# Patient Record
Sex: FEMALE | Race: OTHER | HISPANIC OR LATINO | Employment: FULL TIME | URBAN - METROPOLITAN AREA
[De-identification: names, ages, dates, MRNs, and addresses within clinical notes are randomized per-mention and may not be internally consistent; named-entity substitution may affect disease eponyms.]

---

## 2017-01-30 ENCOUNTER — APPOINTMENT (OUTPATIENT)
Dept: LAB | Facility: HOSPITAL | Age: 62
End: 2017-01-30
Attending: INTERNAL MEDICINE
Payer: COMMERCIAL

## 2017-01-30 DIAGNOSIS — E03.9 HYPOTHYROIDISM: ICD-10-CM

## 2017-01-30 DIAGNOSIS — F41.9 ANXIETY DISORDER: ICD-10-CM

## 2017-01-30 DIAGNOSIS — M54.50 LOW BACK PAIN: ICD-10-CM

## 2017-01-30 LAB
25(OH)D3 SERPL-MCNC: 32.6 NG/ML (ref 30–100)
ANION GAP SERPL CALCULATED.3IONS-SCNC: 5 MMOL/L (ref 4–13)
BASOPHILS # BLD AUTO: 0.02 THOUSANDS/ΜL (ref 0–0.1)
BASOPHILS NFR BLD AUTO: 0 % (ref 0–1)
BUN SERPL-MCNC: 19 MG/DL (ref 5–25)
CALCIUM SERPL-MCNC: 9 MG/DL (ref 8.3–10.1)
CHLORIDE SERPL-SCNC: 103 MMOL/L (ref 100–108)
CO2 SERPL-SCNC: 32 MMOL/L (ref 21–32)
CREAT SERPL-MCNC: 0.78 MG/DL (ref 0.6–1.3)
EOSINOPHIL # BLD AUTO: 0.13 THOUSAND/ΜL (ref 0–0.61)
EOSINOPHIL NFR BLD AUTO: 2 % (ref 0–6)
ERYTHROCYTE [DISTWIDTH] IN BLOOD BY AUTOMATED COUNT: 14.3 % (ref 11.6–15.1)
GFR SERPL CREATININE-BSD FRML MDRD: >60 ML/MIN/1.73SQ M
GLUCOSE SERPL-MCNC: 94 MG/DL (ref 65–140)
HCT VFR BLD AUTO: 37 % (ref 34.8–46.1)
HGB BLD-MCNC: 12.2 G/DL (ref 11.5–15.4)
LYMPHOCYTES # BLD AUTO: 2.61 THOUSANDS/ΜL (ref 0.6–4.47)
LYMPHOCYTES NFR BLD AUTO: 34 % (ref 14–44)
MAGNESIUM SERPL-MCNC: 2 MG/DL (ref 1.6–2.6)
MCH RBC QN AUTO: 27.4 PG (ref 26.8–34.3)
MCHC RBC AUTO-ENTMCNC: 33 G/DL (ref 31.4–37.4)
MCV RBC AUTO: 83 FL (ref 82–98)
MONOCYTES # BLD AUTO: 0.51 THOUSAND/ΜL (ref 0.17–1.22)
MONOCYTES NFR BLD AUTO: 7 % (ref 4–12)
NEUTROPHILS # BLD AUTO: 4.36 THOUSANDS/ΜL (ref 1.85–7.62)
NEUTS SEG NFR BLD AUTO: 57 % (ref 43–75)
NRBC BLD AUTO-RTO: 0 /100 WBCS
PLATELET # BLD AUTO: 290 THOUSANDS/UL (ref 149–390)
PMV BLD AUTO: 11.3 FL (ref 8.9–12.7)
POTASSIUM SERPL-SCNC: 4.1 MMOL/L (ref 3.5–5.3)
RBC # BLD AUTO: 4.45 MILLION/UL (ref 3.81–5.12)
SODIUM SERPL-SCNC: 140 MMOL/L (ref 136–145)
T4 FREE SERPL-MCNC: 1.21 NG/DL (ref 0.76–1.46)
TSH SERPL DL<=0.05 MIU/L-ACNC: 3.32 UIU/ML (ref 0.36–3.74)
WBC # BLD AUTO: 7.63 THOUSAND/UL (ref 4.31–10.16)

## 2017-01-30 PROCEDURE — 85025 COMPLETE CBC W/AUTO DIFF WBC: CPT

## 2017-01-30 PROCEDURE — 83735 ASSAY OF MAGNESIUM: CPT

## 2017-01-30 PROCEDURE — 80048 BASIC METABOLIC PNL TOTAL CA: CPT

## 2017-01-30 PROCEDURE — 82306 VITAMIN D 25 HYDROXY: CPT

## 2017-01-30 PROCEDURE — 84439 ASSAY OF FREE THYROXINE: CPT

## 2017-01-30 PROCEDURE — 84443 ASSAY THYROID STIM HORMONE: CPT

## 2017-01-30 PROCEDURE — 36415 COLL VENOUS BLD VENIPUNCTURE: CPT

## 2017-01-31 ENCOUNTER — ALLSCRIPTS OFFICE VISIT (OUTPATIENT)
Dept: OTHER | Facility: OTHER | Age: 62
End: 2017-01-31

## 2017-02-01 DIAGNOSIS — E03.9 HYPOTHYROIDISM: ICD-10-CM

## 2017-02-01 DIAGNOSIS — H61.22 IMPACTED CERUMEN OF LEFT EAR: ICD-10-CM

## 2017-02-01 DIAGNOSIS — Z12.11 ENCOUNTER FOR SCREENING FOR MALIGNANT NEOPLASM OF COLON: ICD-10-CM

## 2017-02-23 ENCOUNTER — APPOINTMENT (OUTPATIENT)
Dept: LAB | Facility: HOSPITAL | Age: 62
End: 2017-02-23
Attending: INTERNAL MEDICINE
Payer: COMMERCIAL

## 2017-02-23 DIAGNOSIS — Z12.11 ENCOUNTER FOR SCREENING FOR MALIGNANT NEOPLASM OF COLON: ICD-10-CM

## 2017-02-23 LAB — HEMOCCULT STL QL IA: NEGATIVE

## 2017-02-23 PROCEDURE — G0328 FECAL BLOOD SCRN IMMUNOASSAY: HCPCS

## 2017-02-24 ENCOUNTER — GENERIC CONVERSION - ENCOUNTER (OUTPATIENT)
Dept: OTHER | Facility: OTHER | Age: 62
End: 2017-02-24

## 2017-03-21 ENCOUNTER — GENERIC CONVERSION - ENCOUNTER (OUTPATIENT)
Dept: OTHER | Facility: OTHER | Age: 62
End: 2017-03-21

## 2017-03-21 ENCOUNTER — APPOINTMENT (EMERGENCY)
Dept: CT IMAGING | Facility: HOSPITAL | Age: 62
End: 2017-03-21
Payer: COMMERCIAL

## 2017-03-21 ENCOUNTER — APPOINTMENT (EMERGENCY)
Dept: RADIOLOGY | Facility: HOSPITAL | Age: 62
End: 2017-03-21
Payer: COMMERCIAL

## 2017-03-21 ENCOUNTER — HOSPITAL ENCOUNTER (OUTPATIENT)
Facility: HOSPITAL | Age: 62
Setting detail: OBSERVATION
Discharge: HOME | End: 2017-03-22
Attending: EMERGENCY MEDICINE | Admitting: INTERNAL MEDICINE
Payer: COMMERCIAL

## 2017-03-21 DIAGNOSIS — I44.7 LEFT BUNDLE BRANCH BLOCK (LBBB) ON ELECTROCARDIOGRAM: ICD-10-CM

## 2017-03-21 DIAGNOSIS — I63.9 STROKE (HCC): Primary | ICD-10-CM

## 2017-03-21 DIAGNOSIS — R29.90 MULTIPLE NEUROLOGICAL SYMPTOMS: ICD-10-CM

## 2017-03-21 PROBLEM — I45.4: Status: ACTIVE | Noted: 2017-03-21

## 2017-03-21 PROBLEM — I10 ACCELERATED HYPERTENSION: Status: ACTIVE | Noted: 2017-03-21

## 2017-03-21 PROBLEM — F41.8 DEPRESSION WITH ANXIETY: Status: ACTIVE | Noted: 2017-03-21

## 2017-03-21 PROBLEM — E03.4 HYPOTHYROIDISM DUE TO ACQUIRED ATROPHY OF THYROID: Status: ACTIVE | Noted: 2017-03-21

## 2017-03-21 LAB
ANION GAP SERPL CALCULATED.3IONS-SCNC: 5 MMOL/L (ref 4–13)
BASOPHILS # BLD AUTO: 0.01 THOUSANDS/ΜL (ref 0–0.1)
BASOPHILS NFR BLD AUTO: 0 % (ref 0–1)
BUN SERPL-MCNC: 17 MG/DL (ref 5–25)
CALCIUM SERPL-MCNC: 9 MG/DL (ref 8.3–10.1)
CHLORIDE SERPL-SCNC: 107 MMOL/L (ref 100–108)
CO2 SERPL-SCNC: 32 MMOL/L (ref 21–32)
CREAT SERPL-MCNC: 0.77 MG/DL (ref 0.6–1.3)
EOSINOPHIL # BLD AUTO: 0.1 THOUSAND/ΜL (ref 0–0.61)
EOSINOPHIL NFR BLD AUTO: 2 % (ref 0–6)
ERYTHROCYTE [DISTWIDTH] IN BLOOD BY AUTOMATED COUNT: 14.2 % (ref 11.6–15.1)
GFR SERPL CREATININE-BSD FRML MDRD: >60 ML/MIN/1.73SQ M
GLUCOSE SERPL-MCNC: 101 MG/DL (ref 65–140)
HCT VFR BLD AUTO: 37 % (ref 34.8–46.1)
HGB BLD-MCNC: 12 G/DL (ref 11.5–15.4)
LYMPHOCYTES # BLD AUTO: 2.16 THOUSANDS/ΜL (ref 0.6–4.47)
LYMPHOCYTES NFR BLD AUTO: 32 % (ref 14–44)
MCH RBC QN AUTO: 27 PG (ref 26.8–34.3)
MCHC RBC AUTO-ENTMCNC: 32.4 G/DL (ref 31.4–37.4)
MCV RBC AUTO: 83 FL (ref 82–98)
MONOCYTES # BLD AUTO: 0.53 THOUSAND/ΜL (ref 0.17–1.22)
MONOCYTES NFR BLD AUTO: 8 % (ref 4–12)
NEUTROPHILS # BLD AUTO: 3.89 THOUSANDS/ΜL (ref 1.85–7.62)
NEUTS SEG NFR BLD AUTO: 58 % (ref 43–75)
NRBC BLD AUTO-RTO: 0 /100 WBCS
PLATELET # BLD AUTO: 238 THOUSANDS/UL (ref 149–390)
PMV BLD AUTO: 11.7 FL (ref 8.9–12.7)
POTASSIUM SERPL-SCNC: 4 MMOL/L (ref 3.5–5.3)
RBC # BLD AUTO: 4.45 MILLION/UL (ref 3.81–5.12)
SODIUM SERPL-SCNC: 144 MMOL/L (ref 136–145)
TROPONIN I SERPL-MCNC: 0.02 NG/ML
WBC # BLD AUTO: 6.69 THOUSAND/UL (ref 4.31–10.16)

## 2017-03-21 PROCEDURE — 70496 CT ANGIOGRAPHY HEAD: CPT

## 2017-03-21 PROCEDURE — 80048 BASIC METABOLIC PNL TOTAL CA: CPT

## 2017-03-21 PROCEDURE — 36415 COLL VENOUS BLD VENIPUNCTURE: CPT

## 2017-03-21 PROCEDURE — 70498 CT ANGIOGRAPHY NECK: CPT

## 2017-03-21 PROCEDURE — 85025 COMPLETE CBC W/AUTO DIFF WBC: CPT | Performed by: EMERGENCY MEDICINE

## 2017-03-21 PROCEDURE — 71020 HB CHEST X-RAY 2VW FRONTAL&LATL: CPT

## 2017-03-21 PROCEDURE — 93005 ELECTROCARDIOGRAM TRACING: CPT

## 2017-03-21 PROCEDURE — 99285 EMERGENCY DEPT VISIT HI MDM: CPT

## 2017-03-21 PROCEDURE — 84484 ASSAY OF TROPONIN QUANT: CPT | Performed by: EMERGENCY MEDICINE

## 2017-03-21 RX ORDER — ASPIRIN 81 MG/1
324 TABLET, CHEWABLE ORAL ONCE
Status: COMPLETED | OUTPATIENT
Start: 2017-03-21 | End: 2017-03-21

## 2017-03-21 RX ORDER — LEVOTHYROXINE SODIUM 0.05 MG/1
50 TABLET ORAL
Status: DISCONTINUED | OUTPATIENT
Start: 2017-03-22 | End: 2017-03-22 | Stop reason: HOSPADM

## 2017-03-21 RX ORDER — ALPRAZOLAM 0.5 MG/1
TABLET ORAL
COMMUNITY
Start: 2013-12-25 | End: 2018-03-27 | Stop reason: SDUPTHER

## 2017-03-21 RX ORDER — ALPRAZOLAM 0.5 MG/1
0.5 TABLET ORAL 2 TIMES DAILY PRN
Status: DISCONTINUED | OUTPATIENT
Start: 2017-03-21 | End: 2017-03-22 | Stop reason: HOSPADM

## 2017-03-21 RX ORDER — LEVOTHYROXINE SODIUM 0.05 MG/1
50 TABLET ORAL DAILY
COMMUNITY
End: 2018-03-27 | Stop reason: SDUPTHER

## 2017-03-21 RX ORDER — ASPIRIN 325 MG
325 TABLET ORAL DAILY
Status: DISCONTINUED | OUTPATIENT
Start: 2017-03-22 | End: 2017-03-22

## 2017-03-21 RX ORDER — ATORVASTATIN CALCIUM 40 MG/1
40 TABLET, FILM COATED ORAL EVERY EVENING
Status: DISCONTINUED | OUTPATIENT
Start: 2017-03-21 | End: 2017-03-22

## 2017-03-21 RX ADMIN — ASPIRIN 324 MG: 81 TABLET, CHEWABLE ORAL at 18:44

## 2017-03-21 RX ADMIN — IOHEXOL 85 ML: 350 INJECTION, SOLUTION INTRAVENOUS at 17:42

## 2017-03-21 RX ADMIN — ATORVASTATIN CALCIUM 40 MG: 40 TABLET, FILM COATED ORAL at 21:30

## 2017-03-22 ENCOUNTER — GENERIC CONVERSION - ENCOUNTER (OUTPATIENT)
Dept: OTHER | Facility: OTHER | Age: 62
End: 2017-03-22

## 2017-03-22 ENCOUNTER — APPOINTMENT (INPATIENT)
Dept: NON INVASIVE DIAGNOSTICS | Facility: HOSPITAL | Age: 62
End: 2017-03-22
Payer: COMMERCIAL

## 2017-03-22 ENCOUNTER — APPOINTMENT (INPATIENT)
Dept: SPEECH THERAPY | Facility: HOSPITAL | Age: 62
End: 2017-03-22
Payer: COMMERCIAL

## 2017-03-22 ENCOUNTER — APPOINTMENT (INPATIENT)
Dept: MRI IMAGING | Facility: HOSPITAL | Age: 62
End: 2017-03-22
Payer: COMMERCIAL

## 2017-03-22 VITALS
HEART RATE: 80 BPM | OXYGEN SATURATION: 91 % | SYSTOLIC BLOOD PRESSURE: 114 MMHG | DIASTOLIC BLOOD PRESSURE: 61 MMHG | RESPIRATION RATE: 18 BRPM | TEMPERATURE: 96.7 F | WEIGHT: 122 LBS

## 2017-03-22 PROBLEM — I10 ACCELERATED HYPERTENSION: Status: RESOLVED | Noted: 2017-03-21 | Resolved: 2017-03-22

## 2017-03-22 PROBLEM — R55 NEAR SYNCOPE: Status: ACTIVE | Noted: 2017-03-21

## 2017-03-22 LAB
ATRIAL RATE: 69 BPM
CHOLEST SERPL-MCNC: 210 MG/DL (ref 50–200)
HCYS SERPL-SCNC: 7.6 UMOL/L (ref 3.7–11.2)
HDLC SERPL-MCNC: 89 MG/DL (ref 40–60)
LDLC SERPL CALC-MCNC: 116 MG/DL (ref 0–100)
P AXIS: 70 DEGREES
PR INTERVAL: 168 MS
QRS AXIS: 12 DEGREES
QRSD INTERVAL: 130 MS
QT INTERVAL: 438 MS
QTC INTERVAL: 469 MS
T WAVE AXIS: 68 DEGREES
TRIGL SERPL-MCNC: 27 MG/DL
VENTRICULAR RATE: 69 BPM

## 2017-03-22 PROCEDURE — 93306 TTE W/DOPPLER COMPLETE: CPT

## 2017-03-22 PROCEDURE — 93880 EXTRACRANIAL BILAT STUDY: CPT

## 2017-03-22 PROCEDURE — 83090 ASSAY OF HOMOCYSTEINE: CPT | Performed by: INTERNAL MEDICINE

## 2017-03-22 PROCEDURE — 70551 MRI BRAIN STEM W/O DYE: CPT

## 2017-03-22 PROCEDURE — 80061 LIPID PANEL: CPT | Performed by: INTERNAL MEDICINE

## 2017-03-22 RX ADMIN — ASPIRIN 325 MG: 325 TABLET ORAL at 08:31

## 2017-03-22 RX ADMIN — LEVOTHYROXINE SODIUM 50 MCG: 50 TABLET ORAL at 05:48

## 2017-03-22 RX ADMIN — ENOXAPARIN SODIUM 40 MG: 40 INJECTION SUBCUTANEOUS at 08:31

## 2017-03-24 ENCOUNTER — GENERIC CONVERSION - ENCOUNTER (OUTPATIENT)
Dept: OTHER | Facility: OTHER | Age: 62
End: 2017-03-24

## 2017-03-30 ENCOUNTER — ALLSCRIPTS OFFICE VISIT (OUTPATIENT)
Dept: OTHER | Facility: OTHER | Age: 62
End: 2017-03-30

## 2017-03-31 ENCOUNTER — GENERIC CONVERSION - ENCOUNTER (OUTPATIENT)
Dept: OTHER | Facility: OTHER | Age: 62
End: 2017-03-31

## 2017-09-18 ENCOUNTER — APPOINTMENT (OUTPATIENT)
Dept: LAB | Facility: HOSPITAL | Age: 62
End: 2017-09-18
Attending: INTERNAL MEDICINE
Payer: COMMERCIAL

## 2017-09-18 DIAGNOSIS — E03.9 HYPOTHYROIDISM: ICD-10-CM

## 2017-09-18 DIAGNOSIS — H61.22 IMPACTED CERUMEN OF LEFT EAR: ICD-10-CM

## 2017-09-18 LAB
25(OH)D3 SERPL-MCNC: 25.3 NG/ML (ref 30–100)
ALBUMIN SERPL BCP-MCNC: 3.6 G/DL (ref 3.5–5)
ALP SERPL-CCNC: 118 U/L (ref 46–116)
ALT SERPL W P-5'-P-CCNC: 36 U/L (ref 12–78)
ANION GAP SERPL CALCULATED.3IONS-SCNC: 5 MMOL/L (ref 4–13)
AST SERPL W P-5'-P-CCNC: 26 U/L (ref 5–45)
BACTERIA UR QL AUTO: ABNORMAL /HPF
BASOPHILS # BLD AUTO: 0.02 THOUSANDS/ΜL (ref 0–0.1)
BASOPHILS NFR BLD AUTO: 0 % (ref 0–1)
BILIRUB SERPL-MCNC: 0.4 MG/DL (ref 0.2–1)
BILIRUB UR QL STRIP: NEGATIVE
BUN SERPL-MCNC: 19 MG/DL (ref 5–25)
CALCIUM SERPL-MCNC: 9.2 MG/DL (ref 8.3–10.1)
CHLORIDE SERPL-SCNC: 107 MMOL/L (ref 100–108)
CLARITY UR: CLEAR
CO2 SERPL-SCNC: 32 MMOL/L (ref 21–32)
COLOR UR: YELLOW
CREAT SERPL-MCNC: 0.83 MG/DL (ref 0.6–1.3)
EOSINOPHIL # BLD AUTO: 0.08 THOUSAND/ΜL (ref 0–0.61)
EOSINOPHIL NFR BLD AUTO: 1 % (ref 0–6)
ERYTHROCYTE [DISTWIDTH] IN BLOOD BY AUTOMATED COUNT: 14.6 % (ref 11.6–15.1)
GFR SERPL CREATININE-BSD FRML MDRD: 76 ML/MIN/1.73SQ M
GLUCOSE SERPL-MCNC: 85 MG/DL (ref 65–140)
GLUCOSE UR STRIP-MCNC: NEGATIVE MG/DL
HCT VFR BLD AUTO: 35.5 % (ref 34.8–46.1)
HGB BLD-MCNC: 11.7 G/DL (ref 11.5–15.4)
HGB UR QL STRIP.AUTO: ABNORMAL
KETONES UR STRIP-MCNC: NEGATIVE MG/DL
LEUKOCYTE ESTERASE UR QL STRIP: NEGATIVE
LYMPHOCYTES # BLD AUTO: 2.21 THOUSANDS/ΜL (ref 0.6–4.47)
LYMPHOCYTES NFR BLD AUTO: 27 % (ref 14–44)
MCH RBC QN AUTO: 27.3 PG (ref 26.8–34.3)
MCHC RBC AUTO-ENTMCNC: 33 G/DL (ref 31.4–37.4)
MCV RBC AUTO: 83 FL (ref 82–98)
MONOCYTES # BLD AUTO: 0.61 THOUSAND/ΜL (ref 0.17–1.22)
MONOCYTES NFR BLD AUTO: 7 % (ref 4–12)
NEUTROPHILS # BLD AUTO: 5.41 THOUSANDS/ΜL (ref 1.85–7.62)
NEUTS SEG NFR BLD AUTO: 65 % (ref 43–75)
NITRITE UR QL STRIP: NEGATIVE
NON-SQ EPI CELLS URNS QL MICRO: ABNORMAL /HPF
NRBC BLD AUTO-RTO: 0 /100 WBCS
PH UR STRIP.AUTO: 6.5 [PH] (ref 4.5–8)
PLATELET # BLD AUTO: 230 THOUSANDS/UL (ref 149–390)
PMV BLD AUTO: 11.3 FL (ref 8.9–12.7)
POTASSIUM SERPL-SCNC: 4.3 MMOL/L (ref 3.5–5.3)
PROT SERPL-MCNC: 7.2 G/DL (ref 6.4–8.2)
PROT UR STRIP-MCNC: NEGATIVE MG/DL
RBC # BLD AUTO: 4.28 MILLION/UL (ref 3.81–5.12)
RBC #/AREA URNS AUTO: ABNORMAL /HPF
SODIUM SERPL-SCNC: 144 MMOL/L (ref 136–145)
SP GR UR STRIP.AUTO: 1.02 (ref 1–1.03)
T4 FREE SERPL-MCNC: 1.24 NG/DL (ref 0.76–1.46)
TSH SERPL DL<=0.05 MIU/L-ACNC: 2.6 UIU/ML (ref 0.36–3.74)
UROBILINOGEN UR QL STRIP.AUTO: 1 E.U./DL
WBC # BLD AUTO: 8.33 THOUSAND/UL (ref 4.31–10.16)
WBC #/AREA URNS AUTO: ABNORMAL /HPF

## 2017-09-18 PROCEDURE — 80053 COMPREHEN METABOLIC PANEL: CPT

## 2017-09-18 PROCEDURE — 84443 ASSAY THYROID STIM HORMONE: CPT

## 2017-09-18 PROCEDURE — 36415 COLL VENOUS BLD VENIPUNCTURE: CPT

## 2017-09-18 PROCEDURE — 82306 VITAMIN D 25 HYDROXY: CPT

## 2017-09-18 PROCEDURE — 85025 COMPLETE CBC W/AUTO DIFF WBC: CPT

## 2017-09-18 PROCEDURE — 84439 ASSAY OF FREE THYROXINE: CPT

## 2017-09-18 PROCEDURE — 81001 URINALYSIS AUTO W/SCOPE: CPT

## 2017-09-19 ENCOUNTER — ALLSCRIPTS OFFICE VISIT (OUTPATIENT)
Dept: OTHER | Facility: OTHER | Age: 62
End: 2017-09-19

## 2017-09-19 ENCOUNTER — TRANSCRIBE ORDERS (OUTPATIENT)
Dept: ADMINISTRATIVE | Facility: HOSPITAL | Age: 62
End: 2017-09-19

## 2017-09-19 DIAGNOSIS — Z12.31 VISIT FOR SCREENING MAMMOGRAM: Primary | ICD-10-CM

## 2017-10-02 ENCOUNTER — GENERIC CONVERSION - ENCOUNTER (OUTPATIENT)
Dept: OTHER | Facility: OTHER | Age: 62
End: 2017-10-02

## 2017-10-02 PROCEDURE — G0145 SCR C/V CYTO,THINLAYER,RESCR: HCPCS | Performed by: OBSTETRICS & GYNECOLOGY

## 2017-10-04 ENCOUNTER — LAB REQUISITION (OUTPATIENT)
Dept: LAB | Facility: HOSPITAL | Age: 62
End: 2017-10-04
Payer: COMMERCIAL

## 2017-10-04 DIAGNOSIS — Z12.4 ENCOUNTER FOR SCREENING FOR MALIGNANT NEOPLASM OF CERVIX: ICD-10-CM

## 2017-10-16 ENCOUNTER — HOSPITAL ENCOUNTER (OUTPATIENT)
Dept: MAMMOGRAPHY | Facility: HOSPITAL | Age: 62
Discharge: HOME | End: 2017-10-16
Attending: INTERNAL MEDICINE
Payer: COMMERCIAL

## 2017-10-16 DIAGNOSIS — Z12.31 ENCOUNTER FOR SCREENING MAMMOGRAM FOR MALIGNANT NEOPLASM OF BREAST: ICD-10-CM

## 2017-10-16 PROCEDURE — G0202 SCR MAMMO BI INCL CAD: HCPCS

## 2017-10-19 LAB
LAB AP GYN PRIMARY INTERPRETATION: NORMAL
Lab: NORMAL

## 2017-10-24 DIAGNOSIS — Z12.31 ENCOUNTER FOR SCREENING MAMMOGRAM FOR MALIGNANT NEOPLASM OF BREAST: ICD-10-CM

## 2017-10-26 NOTE — PROGRESS NOTES
Assessment  1  Hypothyroidism (244 9) (E03 9)   2  Sacroiliitis (720 2) (M46 1)   3  History of hysterectomy (V88 01) (Z90 710)   4  Vitamin D deficiency (268 9) (E55 9)    Plan  Allergic rhinitis    · Fluticasone Propionate 50 MCG/ACT Nasal Suspension (Flonase); USE TWO SPRAYS EACH  NOSTRIL ONCE DAILY  Anxiety    · From  ALPRAZolam 0 5 MG Oral Tablet TAKE 1 TABLET TWICE DAILY AS NEEDED To  ALPRAZolam 0 5 MG Oral Tablet TAKE 1/2  or  1   TABLET TWICE DAILY AS NEEDED  Anxiety, Chronic left-sided low back pain without sciatica, Hypothyroidism, Vitamin D deficiency    · Vitamin D3 62475 UNIT Oral Capsule; Take 1 tablet monthly  Complete tear of left rotator cuff    · Naproxen 500 MG Oral Tablet (Naprosyn); TAKE 1 TABLET EVERY 12 HOURS WITH FOOD  AS NEEDED  Health Maintenance    · Multi Vitamin/Minerals Oral Tablet; take 1 tablet by mouth every day   · * MAMMO SCREENING BILATERAL W CAD; Status:Hold For - Scheduling; Requested HWA:[id number];   Hypothyroidism    · *1 - [facility] (Obstetrics/ Gynecology) Co-Management  *  Status: Hold For - Scheduling   Requested for: [id number]  Care Summary provided  : Yes  Need for prophylactic vaccination and inoculation against influenza    · Fluzone Quadrivalent Intramuscular Suspension  Sacroiliitis    · (1) BASIC METABOLIC PROFILE; Status:Active; Requested for:19Mar2018;    · (1) CBC/PLT/DIFF; Status:Active; Requested for:19Mar2018;    · (1) T4, FREE; Status:Active; Requested for:19Mar2018;    · (1) TSH; Status:Active; Requested for:19Mar2018;    · (1) URINALYSIS (will reflex a microscopy if leukocytes, occult blood, protein or nitrites are not within  normal limits); Status:Active; Requested for:19Mar2018;    · (1) VITAMIN D 25-HYDROXY; Status:Active;  Requested for:19Mar2018;    · Follow-up visit in 6 months Evaluation and Treatment  Follow-up  Status: Hold For - Scheduling   Requested for: 51Wsg3079    Discussion/Summary  Discussion Summary:   Renew the Naprosyn will renew this Xanax she will continue level thyroxine  We will check a mammography will refer for her to gynecology because she had a partial hysterectomy she has had a gynecology exam in years is good for her to have another one to check her ovaries  She is totally asymptomatic  I reorder for an occasional anxiety episode on what she feels well she doesn't appear anxious or depressed  Counseling Documentation With Imm: The patient was counseled regarding diagnostic results,-- instructions for management,-- risk factor reductions,-- prognosis,-- patient and family education,-- impressions,-- risks and benefits of treatment options,-- importance of compliance with treatment  Medication SE Review and Pt Understands Tx: Possible side effects of new medications were reviewed with the patient/guardian today  The treatment plan was reviewed with the patient/guardian  The patient/guardian understands and agrees with the treatment plan      Chief Complaint  Chief Complaint Free Text Note Form: overdue 6 month follow up for Hypothyroidism  History of Present Illness  HPI: Problem #1 hypothyroidism is well-controlled low-level thyroxine TSH and T4 is normal  #2 anxiety she is doing well she is not a Lexapro anymore she takes NX only on a when necessary med basis which I order  D deficiency has been replace ibuprofen on the vitamin D protocol 50,000 units monthly  is always has some residual low back pain but she is living with the doing her activities of daily living I give her Naprosyn which helps 500 twice a day as needed  health maintenance she is stability due for her mammography October 24 so we give her mammography prescription today  reminded me she had a hysterectomy in the past       Review of Systems  Complete-Female:   Constitutional: No fever, no chills, feels well, no tiredness, no recent weight gain or weight loss     Eyes: No complaints of eye pain, no red eyes, no eyesight problems, no discharge, no dry eyes, no itching of eyes  ENT: no complaints of earache, no loss of hearing, no nose bleeds, no nasal discharge, no sore throat, no hoarseness  Cardiovascular: No complaints of slow heart rate, no fast heart rate, no chest pain, no palpitations, no leg claudication, no lower extremity edema  Respiratory: No complaints of shortness of breath, no wheezing, no cough, no SOB on exertion, no orthopnea, no PND  Gastrointestinal: No complaints of abdominal pain, no constipation, no nausea or vomiting, no diarrhea, no bloody stools  Genitourinary: No complaints of dysuria, no incontinence, no pelvic pain, no dysmenorrhea, no vaginal discharge or bleeding  Musculoskeletal: No complaints of arthralgias, no myalgias, no joint swelling or stiffness, no limb pain or swelling  Integumentary: No complaints of skin rash or lesions, no itching, no skin wounds, no breast pain or lump  Neurological: No complaints of headache, no confusion, no convulsions, no numbness, no dizziness or fainting, no tingling, no limb weakness, no difficulty walking  Psychiatric: Not suicidal, no sleep disturbance, no anxiety or depression, no change in personality, no emotional problems  Endocrine: No complaints of proptosis, no hot flashes, no muscle weakness, no deepening of the voice, no feelings of weakness  Hematologic/Lymphatic: No complaints of swollen glands, no swollen glands in the neck, does not bleed easily, does not bruise easily  Active Problems  1  Abdominal pain, LLQ (left lower quadrant) (789 04) (R10 32)   2  Acute back pain with sciatica, left (724 3) (M54 42)   3  Acute left-sided low back pain with left-sided sciatica (724 2,724 3) (M54 42)   4  Aftercare following surgery of the musculoskeletal system (V58 78) (Z47 89)   5  Allergic rhinitis (477 9) (J30 9)   6  Anemia (285 9) (D64 9)   7  Anxiety (300 00) (F41 9)   8  Cervical cancer screening (V76 2) (Z12 4)   9   Chronic left-sided low back pain without sciatica (724 2,338 29) (M54 5,G89 29)   10  Colon cancer screening (V76 51) (Z12 11)   11  Complete tear of left rotator cuff (727 61) (M75 122)   12  Depression with anxiety (300 4) (F41 8)   13  Dermatomycosis (111 9) (B36 9)   14  Eczema of external ear, left (380 22) (H60 542)   15  Encounter for screening mammogram for malignant neoplasm of breast (V76 12) (Z12 31)   16  Hyperlipidemia (272 4) (E78 5)   17  Hypothyroidism (244 9) (E03 9)   18  Impacted cerumen of left ear (380 4) (H61 22)   19  Left bundle branch block (426 3) (I44 7)   20  Left rotator cuff tear (840 4) (M75 102)   21  Microscopic hematuria (599 72) (R31 29)   22  Need for prophylactic vaccination and inoculation against influenza (V04 81) (Z23)   23  Otitis externa (380 10) (H60 90)   24  Pain in joint of left shoulder (719 41) (M25 512)   25  Rotator cuff tendinitis, unspecified laterality (726 10) (M75 80)   26  Sacroiliitis (720 2) (M46 1)   27  Screening for cholesterol level (V77 91) (Z13 220)   28  Shoulder pain, right (719 41) (M25 511)   29  Spider veins (448 1) (I78 1)   30  Spondylosis (721 90) (M47 9)   31  Surgical aftercare, injury or trauma (V58 43) (Z48 89)   32  Symptomatic reticular veins, bilateral   33  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, bilateral (454 8) (I83 813)   34  Varicose veins without complication (876 8) (E92 26)   35  Vesicles (709 8) (R23 8)   36  Vitamin D deficiency (268 9) (E55 9)    Past Medical History  1  Adhesive capsulitis of left shoulder (726 0) (M75 02)   2  History of allergic rhinitis (V12 69) (Z87 09)   3  History of No significant past medical history   4  History of 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 for tuberculosis (795 51) (R76 11)    Surgical History  1  History of Arthroscopy Shoulder   2  History of Breast Surgery Lumpectomy   3  History of Hysterectomy   4  History of Shoulder Arthroscopy With Rotator Cuff Repair    Family History  Mother    1  Family history of Breast Cancer (V16 3)   2  Family history of Depression  Daughter    3  Family history of Child 1  At Age 28   2  Family history of Thyroid Disorder (V18 19)  Son    5  Family history of Child 1  At Age ___   10  Family history of Kidney Cancer (V16 51)  Sibling    7  Family history of Cancer  Sister    8  Family history of Depression   9  Family history of Diabetes Mellitus (V18 0)   10  Family history of Thyroid Disorder (V18 19)  Brother    6  Family history of Cancer   12  Family history of Sister  At Age ___  Maternal Grandmother    15  Family history of heart attack (V17 3) (Z82 49)   14  Family history of liver cancer (V16 0) (Z80 0)  Maternal Grandfather    13  Family history of stroke (V17 1) (Z82 3)  Family History    12  Family history of Cancer   17  Family history of Essential Hypertension    Social History   ·    · Four children   · Functioning activity level   · High school or GED   · Living situation   · Never a smoker   · Never Drank Alcohol   · No drug use   · Not currently employed   · Occupation:   · Working Full Time  Social History Reviewed: The social history was reviewed and updated today  Current Meds   1  ALPRAZolam 0 5 MG Oral Tablet; TAKE 1 TABLET TWICE DAILY AS NEEDED; Therapy: 51SZU5564 to (Evaluate:16Bsa8912); Last Rx:2017 Ordered   2  Levothyroxine Sodium 50 MCG Oral Tablet; CARIDAD 1 TABLETA POR VIA ORAL DIARIAMENTE ONE   TABLET BY MOUTH DAILYONE TABLET BY MOUTH DAILY; Therapy: 84CCW4817 to (Evaluate:28Ypo5436)  Requested for: 40FOS2336; Last Rx:2017   Ordered  Medication List Reviewed: The medication list was reviewed and updated today  Allergies  1   No Known Drug Allergies    Vitals  Vital Signs    Recorded: 2017 03:00PM   Temperature 97 2 F   Heart Rate 92   Respiration 16   Systolic 454   Diastolic 67   Height 4 ft 11 5 in   Weight 126 lb 6 oz   BMI Calculated 25 1   BSA Calculated 1 53     Physical Exam    Constitutional   General appearance: No acute distress, well appearing and well nourished  Eyes   Conjunctiva and lids: No swelling, erythema or discharge  Pupils and irises: Equal, round and reactive to light  Ears, Nose, Mouth, and Throat   External inspection of ears and nose: Normal     Otoscopic examination: Tympanic membranes translucent with normal light reflex  Canals patent without erythema  Nasal mucosa, septum, and turbinates: Normal without edema or erythema  Oropharynx: Normal with no erythema, edema, exudate or lesions  Pulmonary   Respiratory effort: No increased work of breathing or signs of respiratory distress  Auscultation of lungs: Clear to auscultation  Cardiovascular   Palpation of heart: Normal PMI, no thrills  Auscultation of heart: Normal rate and rhythm, normal S1 and S2, without murmurs  Examination of extremities for edema and/or varicosities: Normal     Carotid pulses: Normal     Abdomen   Abdomen: Non-tender, no masses  Liver and spleen: No hepatomegaly or splenomegaly  Lymphatic   Palpation of lymph nodes in neck: No lymphadenopathy  Musculoskeletal   Gait and station: Normal     Digits and nails: Normal without clubbing or cyanosis  Inspection/palpation of joints, bones, and muscles: Normal     Skin   Skin and subcutaneous tissue: Normal without rashes or lesions  Psychiatric   Orientation to person, place, and time: Normal     Mood and affect: Abnormal   Mood and Affect: not anxious-- and-- not depressed          Signatures   Electronically signed by : JOO Carvajal ; Sep 19 2017  3:46PM EST                       (Author)

## 2017-12-20 ENCOUNTER — GENERIC CONVERSION - ENCOUNTER (OUTPATIENT)
Dept: OTHER | Facility: OTHER | Age: 62
End: 2017-12-20

## 2018-01-09 NOTE — MISCELLANEOUS
Chief Complaint  Chief Complaint Free Text Note Form: D/C [facility] 3 22 17 for near syncope  NO CHI WAS SCHEDULED  PATIENT DID NOT RETURN CALLS PLACED ON 3 23 17 @ 11:26 AND 3 24 17 @ 11:56  History of Present Illness  TCM Communication [facility]: The patient is being contacted for follow-up after hospitalization  She was hospitalized at [facility]  The date of admission: 3 21 17, date of discharge: 3 22 17  Diagnosis: near syncope  She was discharged to home  Medications were not reviewed today  She did not schedule a follow up appointment  Communication performed and completed by Constanza Dunn      Active Problems    1  Abdominal pain, LLQ (left lower quadrant) (789 04) (R10 32)   2  Acute back pain with sciatica, left (724 3) (M54 42)   3  Acute left-sided low back pain with left-sided sciatica (724 2,724 3) (M54 42)   4  Aftercare following surgery of the musculoskeletal system (V58 78) (Z47 89)   5  Allergic rhinitis (477 9) (J30 9)   6  Anemia (285 9) (D64 9)   7  Anxiety (300 00) (F41 9)   8  Cervical cancer screening (V76 2) (Z12 4)   9  Chronic left-sided low back pain without sciatica (724 2,338 29) (M54 5,G89 29)   10  Colon cancer screening (V76 51) (Z12 11)   11  Complete tear of left rotator cuff (727 61) (M75 122)   12  Depression with anxiety (300 4) (F41 8)   13  Dermatomycosis (111 9) (B36 9)   14  Eczema of external ear, left (380 22) (H60 542)   15  Encounter for screening mammogram for malignant neoplasm of breast (V76 12)    (Z12 31)   16  Hypothyroidism (244 9) (E03 9)   17  Impacted cerumen of left ear (380 4) (H61 22)   18  Left bundle branch block (426 3) (I44 7)   19  Left rotator cuff tear (840 4) (M75 102)   20  Microscopic hematuria (599 72) (R31 29)   21  Need for prophylactic vaccination and inoculation against influenza (V04 81) (Z23)   22  Otitis externa (380 10) (H60 90)   23  Pain in joint of left shoulder (719 41) (M25 512)   24   Rotator cuff tendinitis, unspecified laterality (726 10) (M75 80)   25  Sacroiliitis (720 2) (M46 1)   26  Screening for cholesterol level (V77 91) (Z13 220)   27  Shoulder pain, right (719 41) (M25 511)   28  Spider veins (448 1) (I78 1)   29  Spondylosis (721 90) (M47 9)   30  Surgical aftercare, injury or trauma (V58 43) (Z48 89)   31  Symptomatic reticular veins, bilateral (454 8) (I83 893)   32  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, bilateral (454 8) (I83 813)   33  Varicose veins without complication (162 9) (E90 63)   34  Vesicles (709 8) (R23 8)   35  Vitamin D deficiency (268 9) (E55 9)    Past Medical History    1  Adhesive capsulitis of left shoulder (726 0) (M75 02)   2  History of allergic rhinitis (V12 69) (Z87 09)   3  History of No significant past medical history   4  History of 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 for tuberculosis (795 51) (R76 11)    Surgical History    1  History of Arthroscopy Shoulder   2  History of Breast Surgery Lumpectomy   3  History of Hysterectomy   4  History of Shoulder Arthroscopy With Rotator Cuff Repair    Family History  Mother    1  Family history of Breast Cancer (V16 3)   2  Family history of Depression  Daughter    3  Family history of Child 1  At Age 28   2  Family history of Thyroid Disorder (V18 19)  Son    5  Family history of Child 1  At Age ___   10  Family history of Kidney Cancer (V16 51)  Sibling    7  Family history of Cancer  Sister    8  Family history of Depression   9  Family history of Diabetes Mellitus (V18 0)   10  Family history of Thyroid Disorder (V18 19)  Brother    6  Family history of Cancer   12  Family history of Sister  At Age ___  Maternal Grandmother    15  Family history of heart attack (V17 3) (Z82 49)   14  Family history of liver cancer (V16 0) (Z80 0)  Maternal Grandfather    13  Family history of stroke (V17 1) (Z82 3)  Family History    12  Family history of Cancer   17   Family history of Essential Hypertension    Social History    ·    · Four children   · Functioning activity level   · High school or GED   · Living situation   · Never a smoker   · Never Drank Alcohol   · No drug use   · Not currently employed   · Occupation:   · Working Full Time    Current Meds   1  ALPRAZolam 0 5 MG Oral Tablet; TAKE 1 TABLET TWICE DAILY AS NEEDED; Therapy: 25MCP2943 to (Evaluate:02Mar2017); Last Rx:31Jan2017 Ordered   2  Clotrimazole-Betamethasone 1-0 05 % External Cream; APPLY Y RUB IN A THIN FILM   HASTA AFFECTED AREAS TWICE A DAY(MORNING AND EVENING); Therapy: 84ELK7175 to (James Robb)  Requested for: 29Jhg7902; Last   Rx:06Aug2015 Ordered   3  Debrox 6 5 % Otic Solution; 5-10 drops in each ear twice a day; Therapy: 42CVA3239 to (Evaluate:10Feb2017)  Requested for: 63HWT8342; Last   Rx:31Jan2017 Ordered   4  Fluticasone Propionate 50 MCG/ACT Nasal Suspension; USE TWO SPRAYS EACH   NOSTRIL ONCE DAILY; Therapy: 13NAK4714 to (Last Rx:31Jan2017)  Requested for: 87HII2459 Ordered   5  Hydrocortisone 2 5 % External Ointment; APPLY SPARINGLY TO THE AFFECTED   AREA(S) TWICE DAILY; Therapy: 80EVX5910 to (Evaluate:10Dec2016)  Requested for: 25XKY2601; Last   Rx:11Oct2016 Ordered   6  Levothyroxine Sodium 50 MCG Oral Tablet; TOME ONE TABLET BY MOUTH DAILYTAKE   ONE TABLET BY MOUTH DAILY; Therapy: 53UBU6667 to (Evaluate:19Mar2017)  Requested for: 90RPB2249; Last   Rx:27Feb2017 Ordered   7  Naproxen 500 MG Oral Tablet; CARIDAD 1 TABLETA POR VIA ORAL DOS VECES AL NANETTE   ONE TABLET BY MOUTH TWICE DAILYTAKE ONE TABLET BY AYLIN; Therapy: 24QBE7407 to (Evaluate:04Jan2017)  Requested for: 81QAZ6202; Last   Rx:05Rov2547 Ordered   8  Neomycin-Polymyxin-HC 1 % Otic Solution; INSTILL 4 DROPS IN LEFT EAR 3-4 TIMES   DAILY; Therapy: 47PAI4305 to (Last Rx:17Aug2016)  Requested for: 17Aug2016; Status:   ACTIVE - Transmit to [facility] Verification Ordered   9  Vitamin D3 28447 UNIT Oral Capsule; Take 1 tablet monthly;    Therapy: 97LWX7628 to (Last Rx:11Oct2016) Requested for: 16CIJ7589 Ordered    Allergies    1  No Known Drug Allergies    Results/Data  (1) LIPID PANEL, FASTING 21BCT1649 12:00AM Leonel Cano     Test Name Result Flag Reference   CHOLESTEROL 210     HDL,DIRECT 89     LDL CHOLESTEROL CALCULATED 116     TRIGLYCERIDES 27         Future Appointments    Date/Time Provider Specialty Site   03/30/2017 07:45 AM JOO Coronado  Internal Medicine [facility]   07/25/2017 03:15 PM JOO Coronado   Internal Medicine [facility]     Signatures   Electronically signed by : Erich Reese, ; Mar 24 2017  3:09PM EST                       (Co-author)    Electronically signed by : Erich Reese, ; Mar 28 2017  4:25PM EST                       (Co-author)    Electronically signed by : JOO Wheeler ; Mar 29 2017  7:31AM EST                       (Author)

## 2018-01-09 NOTE — MISCELLANEOUS
Message  Return to work or school:   James Trujillo is under my professional care  She was seen in my office on Today       The patient is one year out from her revision rotator cuff repair of her left shoulder and while she is better than she was when I first initiated care for her, she clearly is limited in her ability to return to her preinjury level of work and function  I have declared her maximally medically improved as of today  In order to determine her permanent restrictions I recommend a functional capacity evaluation if necessary to help her understand what she can and cannot do with that shoulder going forward          Signatures   Electronically signed by : JOO Quezada ; Apr 4 2016  2:45PM EST                       (Author)

## 2018-01-10 NOTE — RESULT NOTES
Message   Recorded as Task   Date: 03/29/2016 06:35 AM, Created By: Al Fry   Task Name: Follow Up   Assigned To: Hugo Dubois   Regarding Patient: Nancy Baker, Status: In Progress   CommentAlphia Hurter - 29 Mar 2016 6:35 AM     TASK CREATED  The quantity Kelly [facility]ravo TB test is negative which is good news  Milderd Ek - 29 Mar 2016 10:12 AM     TASK EDITED  I left a message for Sebastián Credit to call back  Milderd Ek - 29 Mar 2016 10:12 AM     TASK IN PROGRESS   Milderd Ek - 29 Mar 2016 5:13 PM     TASK EDITED  Sebastiánaruna Naranjo was pleased to hear that the test was negative  I faxed her a copy  Verified Results  (1) QUANTIFERON - TB GOLD 23Mar2016 04:13PM Al Fry   TW Order Number: [id number]    Performed at:  [address] [place] [address]  [id number]  : Andrea Lynch MD, Phone:  [phone]     Test Name Result Flag Reference   QUANTIFERON TB GOLD      Specimen incubated at [place] [address] TB GOLD Negative  Negative   QUANTIFERON CRITERIA Comment     To be considered positive a specimen should have a TB Ag minus Nilvalue greater than or equal to 0 35 IU/mL and in addition the TB Agminus Nil value must be greater than or equal to 25% of the Nilvalue  There may be insufficient information in these values todifferentiate between some negative and some indeterminate testvalues  QUANTIFERON TB AG VALUE 0 29 IU/mL     QUANTIFERON NIL VALUE 0 07 IU/mL     QUANTIFERON MITOGEN VALUE >10 00 IU/mL     QFT TB AG MINUS NIL VALUE 0 22 IU/mL     QFT INTERPRETATION Comment     The QuantiFERON TB Gold (in Tube) assay is intended for use as an aidin the diagnosis of TB infection  Negative results suggest that thereis no TB infection  In patients with high suspicion of exposure, anegative test should be repeated  A positive test indicates infectionwith Mycobacterium tuberculosis   Among individuals withouttuberculosis infection, a positive test may be due to exposure Annette tristan, JOO adorno or JOO boyd  On the Internet, go tocdc gov/tb for further details

## 2018-01-12 VITALS
DIASTOLIC BLOOD PRESSURE: 76 MMHG | TEMPERATURE: 97.9 F | BODY MASS INDEX: 24.37 KG/M2 | WEIGHT: 124.13 LBS | RESPIRATION RATE: 14 BRPM | HEART RATE: 76 BPM | SYSTOLIC BLOOD PRESSURE: 118 MMHG | HEIGHT: 60 IN

## 2018-01-12 VITALS
BODY MASS INDEX: 24.81 KG/M2 | DIASTOLIC BLOOD PRESSURE: 67 MMHG | SYSTOLIC BLOOD PRESSURE: 115 MMHG | RESPIRATION RATE: 16 BRPM | HEART RATE: 92 BPM | HEIGHT: 60 IN | WEIGHT: 126.38 LBS | TEMPERATURE: 97.2 F

## 2018-01-12 NOTE — RESULT NOTES
Message   Recorded as Task   Date: 08/17/2016 12:09 PM, Created By: Krzysztof Merchant   Task Name: Follow Up   Assigned To: Alberto Reeves   Regarding Patient: Breana Joe, Status: In Progress   CommentPaolo Pappas - 17 Aug 2016 12:09 PM     TASK CREATED  MRI of the spine showed a small lateral disc protrusion at L5-S1 on the left side most likely the reason when she has the pain  The plan is to say and continue pain management    We will also send her to neurosurgery for second opinion if the pain does not improve   [facility] - 17 Aug 2016 2:03 PM     TASK EDITED  I reviewed the MRI report [facility]  She has an appt with Pain management on 9/22/16  She went to Physical Therapy for evaluation recently  She is also c/o her left ear feeling clogged  She was given Cortisporin Otic solution in the past for this, and wanted to know if we can send a refill  [facility] - 17 Aug 2016 2:03 PM     TASK IN PROGRESS   [facility] - 17 Aug 2016 6:19 PM     TASK EDITED  Dr Alejandro Forte agreed to send the Cortisporin Otic solution to the pharmacy  Verified Results  (1) CBC/PLT/DIFF 83Usy5820 12:23PM Lore Wong Order Number: [id number]     Test Name Result Flag Reference   WBC COUNT 6 43 Thousand/uL  4 31-10 16   RBC COUNT 4 49 Million/uL  3 81-5 12   HEMOGLOBIN 11 7 g/dL  11 5-15 4   HEMATOCRIT 36 9 %  34 8-46  1   MCV 82 fL  82-98   MCH 26 1 pg L 26 8-34 3   MCHC 31 7 g/dL  31 4-37 4   RDW 14 7 %  11 6-15 1   MPV 12 6 fL  8 9-12 7   PLATELET COUNT 865 Thousands/uL  149-390   NEUTROPHILS RELATIVE PERCENT 54 %  43-75   LYMPHOCYTES RELATIVE PERCENT 35 %  14-44   MONOCYTES RELATIVE PERCENT 9 %  4-12   EOSINOPHILS RELATIVE PERCENT 2 %  0-6   BASOPHILS RELATIVE PERCENT 0 %  0-1   NEUTROPHILS ABSOLUTE COUNT 3 44 Thousands/?L  1 85-7 62   LYMPHOCYTES ABSOLUTE COUNT 2 27 Thousands/?L  0 60-4 47   MONOCYTES ABSOLUTE COUNT 0 56 Thousand/?L  0 17-1 22   EOSINOPHILS ABSOLUTE COUNT 0 14 Thousand/?L  0 00-0 61   BASOPHILS ABSOLUTE COUNT 0 02 Thousands/?L  0 00-0 10   - Patient Instructions: This bloodwork is non-fasting  Please drink two glasses of water morning of bloodwork  - Patient Instructions: This bloodwork is non-fasting  Please drink two glasses of water morning of bloodwork  (1) COMPREHENSIVE METABOLIC PANEL 80UUV3895 60:81YQ Alan Monte Order Number: [id number]     Test Name Result Flag Reference   GLUCOSE,RANDM 74 mg/dL     If the patient is fasting, the ADA then defines impaired fasting glucose as > 100 mg/dL and diabetes as > or equal to 123 mg/dL  SODIUM 144 mmol/L  136-145   POTASSIUM 4 2 mmol/L  3 5-5 3   CHLORIDE 107 mmol/L  100-108   CARBON DIOXIDE 28 mmol/L  21-32   ANION GAP (CALC) 9 mmol/L  4-13   BLOOD UREA NITROGEN 16 mg/dL  5-25   CREATININE 0 64 mg/dL  0 60-1 30   Standardized to IDMS reference method   CALCIUM 9 0 mg/dL  8 3-10 1   BILI, TOTAL 0 43 mg/dL  0 20-1 00   ALK PHOSPHATAS 99 U/L     ALT (SGPT) 31 U/L  12-78   AST(SGOT) 21 U/L  5-45   ALBUMIN 3 8 g/dL  3 5-5 0   TOTAL PROTEIN 7 5 g/dL  6 4-8 2   eGFR Non-African American      >60 0 ml/min/1 73sq m   - Patient Instructions: This bloodwork is non-fasting  Please drink two glasses of water morning of bloodwork  National Kidney Disease Education Program recommendations are as follows:  GFR calculation is accurate only with a steady state creatinine  Chronic Kidney disease less than 60 ml/min/1 73 sq  meters  Kidney failure less than 15 ml/min/1 73 sq  meters       (1) GGT 02Mac3387 12:23PM Curtis Galo Order Number: [id number]     Test Name Result Flag Reference   GGT 22 U/L  5-85     (1) T4, FREE 59Ulc6155 12:23PM Alan Monte 57 Order Number: [id number]     Test Name Result Flag Reference   T4,FREE 1 24 ng/dL  0 76-1 46     (1) TSH 15Txz4635 12:23PM Alan Monte 57 Order Number: [id number]     Test Name Result Flag Reference   TSH 3 361 uIU/mL  0 358-3 740   - Patient Instructions: This bloodwork is non-fasting  Please drink two glasses of water morning of bloodwork  - Patient Instructions: This bloodwork is non-fasting  Please drink two glasses of water morning of bloodwork  Patients undergoing fluorescein dye angiography may retain small amounts of fluorescein in the body for 48-72 hours post procedure  Samples containing fluorescein can produce falsely depressed TSH values  If the patient had this procedure,a specimen should be resubmitted post fluorescein clearance            The recommended reference ranges for TSH during pregnancy are as follows:  First trimester 0 1 to 2 5 uIU/mL  Second trimester  0 2 to 3 0 uIU/mL  Third trimester 0 3 to 3 0 uIU/m     (1) URINALYSIS (will reflex a microscopy if leukocytes, occult blood, protein or nitrites are not within normal limits) 97Mzd0784 12:23PM Nissa Gutierrez    Order Number: [id number]     Test Name Result Flag Reference   COLOR Yellow     CLARITY Clear     SPECIFIC GRAVITY UA 1 010  1 003-1 030   PH UA 6 0  4 5-8 0   LEUKOCYTE ESTERASE UA Negative  Negative   NITRITE UA Negative  Negative   PROTEIN UA Negative mg/dl  Negative   GLUCOSE UA Negative mg/dl  Negative   KETONES UA Negative mg/dl  Negative   UROBILINOGEN UA 0 2 E U /dl  0 2, 1 0 E U /dl   BILIRUBIN UA Negative  Negative   BLOOD UA Small A Negative, Trace-Intact   BACTERIA None Seen /hpf  None Seen, Occasional   EPITHELIAL CELLS Occasional /hpf  None Seen, Occasional   RBC UA 0-1 /hpf A None Seen   WBC UA None Seen /hpf  None Seen     (1) VITAMIN D 25-HYDROXY 39Tfi5269 12:23PM Alan Cornelius 57 Order Number: [id number]     Test Name Result Flag Reference   VIT D 25-HYDROX 28 7 ng/mL L 30 0-100 0   This assay is a certified procedure of the CDC Vitamin D Standardization Certification Program (VDSCP)     Deficiency <20ng/ml   Insufficiency 20-30ng/ml   Sufficient  ng/ml     *Patients undergoing fluorescein dye angiography may retain small amounts of fluorescein in the body for 48-72 hours post procedure  Samples containing fluorescein can produce falsely elevated Vitamin D values  If the patient had this procedure, a specimen should be resubmitted post fluorescein clearance  (1) MAGNESIUM 09Aug2016 12:23PM Jamie Hao Order Number: [id number]     Test Name Result Flag Reference   MAGNESIUM 1 9 mg/dL  1 6-2 6   - Patient Instructions: This bloodwork is non-fasting  Please drink two glasses of water morning of bloodwork  (1) SED RATE 09Aug2016 12:23PM Alan Bernard 57 Order Number: [id number]     Test Name Result Flag Reference   SED RATE 9 mm/hour  0-20     (1) C-REACTIVE PROTEIN 09Aug2016 12:23PM Ofelia MATA Order Number: [id number]     Test Name Result Flag Reference   C-REACT PROTEIN <3 0 mg/L  <3 0   - Patient Instructions: This bloodwork is non-fasting  Please drink two glasses of water morning of bloodwork         Plan  Complete tear of left rotator cuff, Otitis externa    · Cortisporin-TC 3 3-3-10-0 5 MG/ML SUSP  Otitis externa    · Neomycin-Polymyxin-HC 1 % Otic Solution; INSTILL 4 DROPS IN LEFT EAR 3-4  TIMES DAILY

## 2018-01-13 NOTE — PROGRESS NOTES
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 8) (T61 919)    Plan  Acute left-sided low back pain with left-sided sciatica, Anxiety, Sacroiliitis    · Stop: Gralise Starter 300 & 600 MG Oral Miscellaneous   Rx By: Jad Gill; Dispense: 0 Days ; #:1 X 78 Miscellaneous Packet; Refill: 0; For: Acute left-sided low back pain with left-sided sciatica, Anxiety, Sacroiliitis; CARLOS = N; Dispense Sample; Last Updated By: Gil Tran; 11/30/2016 10:44:19 AM  Symptomatic reticular veins, bilateral    · Follow-up PRN Evaluation and Treatment  Follow-up  Status: Complete  Done:  71FYJ8895   Ordered; For: Symptomatic reticular veins, bilateral; Ordered By: Vince Mcadams Performed:  Due: 04SKZ8168    Discussion/Summary  Discussion Summary:   [address] [facility] to the office to discuss results of her venous reflux study  She has no evidence of deep or superficial venous incompetence  She has not worn her compression stockings  She continues complain of itching in the vicinity of reticular/spider veins  I have offered her sclerotherapy  I explained to her that this would be an out of pocket expense  I have provided her with both a price/information packet for sclerotherapy  She will think it over and call the office and notify us of her decision  Chief Complaint  Chief Complaint Free Text Note Form: "I am here to go over my test results "         History of Present Illness  HPI: Patient had a LEVDR on 11/9/2016  She complains of bulging veins painful in the in the back of her left leg  She states that the other veins itch  She does not wear compression stockings or elevate her legs  She denies any bleeding veins  Review of Systems  Complete Female - Vasc:   Constitutional: No fever or chills, feels well, no tiredness, no recent weight gain or weight loss     Eyes: no sudden vision loss, no blurred vision and no double vision, but no eye pain, no eyesight problems, no dryness of the eyes, eyes not red, no purulent discharge from the eyes, no itching of the eyes and wears glasses  ENT: no loss of hearing, no nosebleeds, no hoarseness  Cardiovascular: no bleeding veins, but regular heart rate, no chest pain, no intermittent leg claudication, painful veins, no palpitations and leg pain with walking  Respiratory: No sob, no wheezing, no cough, no sob with exertion, no orthopnea  Gastrointestinal: No nausea, No vomiting, no diarrhea, no blood in stool  Genitourinary: no dysuria, no Hematuria,no urinary incontinence  Musculoskeletal: no limb pain, no limb swelling  Integumentary: no rash, no lesions, no wounds, no ulcer  Neurological: no dementia, no headache, no numbness, no limb weakness, no dizziness, no difficulty walking  Psychiatric: no depression, no mood disorders, no anxiety  Hematologic/Lymphatic: no bleeding disorder, no easy bruising  ROS Reviewed:   ROS reviewed  Active Problems    1  Abdominal pain, LLQ (left lower quadrant) (789 04) (R10 32)   2  Acute back pain with sciatica, left (724 3) (M54 42)   3  Acute left-sided low back pain with left-sided sciatica (724 2,724 3) (M54 42)   4  Aftercare following surgery of the musculoskeletal system (V58 78) (Z47 89)   5  Allergic rhinitis (477 9) (J30 9)   6  Anemia (285 9) (D64 9)   7  Anxiety (300 00) (F41 9)   8  Cervical cancer screening (V76 2) (Z12 4)   9  Chronic left-sided low back pain without sciatica (724 2,338 29) (M54 5,G89 29)   10  Complete tear of left rotator cuff (727 61) (M75 122)   11  Depression with anxiety (300 4) (F41 8)   12  Dermatomycosis (111 9) (B36 9)   13  Eczema of external ear, left (380 22) (H60 542)   14  Encounter for screening mammogram for malignant neoplasm of breast (V76 12)    (Z12 31)   15  Hypothyroidism (244 9) (E03 9)   16  Impacted cerumen of left ear (380 4) (H61 22)   17  Left bundle branch block (426 3) (I44 7)   18   Left rotator cuff tear (840 4) (M75 102)   19  Microscopic hematuria (599 72) (R31 29)   20  Need for prophylactic vaccination and inoculation against influenza (V04 81) (Z23)   21  Otitis externa (380 10) (H60 90)   22  Pain in joint of left shoulder (719 41) (M25 512)   23  Rotator cuff tendinitis, unspecified laterality (726 10) (M75 80)   24  Sacroiliitis (720 2) (M46 1)   25  Shoulder pain, right (719 41) (M25 511)   26  Spondylosis (721 90) (M47 9)   27  Surgical aftercare, injury or trauma (V58 43) (Z48 89)   28  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, bilateral (454 8) (I83 813)   29  Varicose veins without complication (951 9) (S75 43)   30  Vesicles (709 8) (R23 8)   31  Vitamin D deficiency (268 9) (E55 9)    Past Medical History    1  Adhesive capsulitis of left shoulder (726 0) (M75 02)   2  History of allergic rhinitis (V12 69) (Z87 09)   3  History of No significant past medical history   4  History of 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 for tuberculosis (795 51) (R76 11)  Active Problems And Past Medical History Reviewed: The active problems and past medical history were reviewed and updated today  Surgical History    1  History of Arthroscopy Shoulder   2  History of Breast Surgery Lumpectomy   3  History of Hysterectomy   4  History of Shoulder Arthroscopy With Rotator Cuff Repair  Surgical History Reviewed: The surgical history was reviewed and updated today  Family History  Mother    1  Family history of Breast Cancer (V16 3)   2  Family history of Depression  Daughter    3  Family history of Child 1  At Age 28   2  Family history of Thyroid Disorder (V18 19)  Son    5  Family history of Child 1  At Age ___   10  Family history of Kidney Cancer (V16 51)  Sibling    7  Family history of Cancer  Sister    8  Family history of Depression   9  Family history of Diabetes Mellitus (V18 0)   10  Family history of Thyroid Disorder (V18 19)  Brother    6  Family history of Cancer   12   Family history of Sister  At Age ___  Maternal Grandmother    15  Family history of heart attack (V17 3) (Z82 49)   14  Family history of liver cancer (V16 0) (Z80 0)  Maternal Grandfather    13  Family history of stroke (V17 1) (Z82 3)  Family History    12  Family history of Cancer   17  Family history of Essential Hypertension    Social History    ·    · Four children   · Functioning activity level   · High school or GED   · Living situation   · Never a smoker   · Never Drank Alcohol   · No drug use   · Not currently employed   · Occupation:   · Working Full Time  Social History Reviewed: The social history was reviewed and updated today  Current Meds   1  ALPRAZolam 0 5 MG Oral Tablet; TAKE 1 TABLET TWICE DAILY AS NEEDED; Therapy: 10SYU5196 to (Evaluate:92Euh3135); Last Rx:2016 Ordered   2  Clotrimazole-Betamethasone 1-0 05 % External Cream; APPLY Y RUB IN A THIN FILM   HASTA AFFECTED AREAS TWICE A DAY(MORNING AND EVENING); Therapy: 35PYJ9206 to (Estrella Day)  Requested for: 08Rby7676; Last   Rx:92Kbk8036 Ordered   3  Diclofenac Sodium 50 MG Oral Tablet Delayed Release; TAKE 1 TABLET 3 TIMES DAILY   AS NEEDED; Therapy: 13JSR6392 to (Patricia Bridges)  Requested for: 65Uew4082; Last   Rx:73Sdt7291 Ordered   4  Fluticasone Propionate 50 MCG/ACT Nasal Suspension; USE TWO SPRAYS EACH   NOSTRIL ONCE DAILY; Therapy: 87RWP3135 to (Last Rx:2016)  Requested for: 2016 Ordered   5  Gralise Starter 300 & 600 MG Oral Miscellaneous; Take 1 tablet daily of 300 mg for 9 days   then take 600 mg tablets 1 tablet daily; Therapy: 79QTM1961 to (Last Rx:2016) Ordered   6  Hydrocortisone 2 5 % External Ointment; APPLY SPARINGLY TO THE AFFECTED   AREA(S) TWICE DAILY; Therapy: 18AZF1996 to (Evaluate:87Dfo9384)  Requested for: 41VHV3214; Last   Rx:2016 Ordered   7   Levothyroxine Sodium 50 MCG Oral Tablet; CARIDAD 1 TABLETA POR VIA ORAL   DIARIAMENTETAKE ONE TABLET BY MOUTH DAILY; Therapy: 81GIK2278 to (Evaluate:38Qei6474)  Requested for: 00XKS7296; Last   Rx:13Jun2016 Ordered   8  Methocarbamol 500 MG Oral Tablet; TAKE 1 TABLET TWICE DAILY AS DIRECTED; Therapy: 17ZFS5763 to (Luiz Corona)  Requested for: 98Hpv9516; Last   Rx:85Hzy7249 Ordered   9  Neomycin-Polymyxin-HC 1 % Otic Solution; INSTILL 4 DROPS IN LEFT EAR 3-4 TIMES   DAILY; Therapy: 81PDT3617 to (Last Rx:17Aug2016)  Requested for: 17Aug2016; Status:   ACTIVE - Transmit to [facility] Verification Ordered   10  Vitamin D3 96701 UNIT Oral Capsule; Take 1 tablet monthly; Therapy: 41VAS6706 to (Last Rx:11Oct2016)  Requested for: 18ENA3736 Ordered  Medication List Reviewed: The medication list was reviewed and updated today  Allergies    1  No Known Drug Allergies    Vitals  Vital Signs    Recorded: 62QNQ7783 10:40AM   Heart Rate 77, L Radial   Pulse Quality Normal, L Radial   Respiration Quality Normal   Respiration 14   Systolic 118, LUE, Sitting   Diastolic 72, LUE, Sitting   Height 4 ft 11 5 in   Weight 124 lb 2 oz   BMI Calculated 24 65   BSA Calculated 1 51     Physical Exam    Posterior tibialis: right 2+ and left 2+  Dorsalis pedis: right 2+ and left 2+  Distal Pulse Exam: Normal Capillary Refill  Extremities: No upper or lower extremity edema  LE Varicose Veins: no right leg truncal veins, no left leg truncal veins, right leg reticular veins, left leg reticular veins, right leg spider veins and left leg spider veins  The heart rate was normal  The rhythm was regular  Pulmonary   Respiratory effort: No increased work of breathing or signs of respiratory distress  Auscultation of lungs: Clear to auscultation  No wheezing, no rales, no rhonchi  Abdomen   Abdomen: Abdomen soft, non-tender, no masses, non distended, no rebound tenderness  Psychiatric   Orientation to person, place and time: Normal    Eyes   Conjunctiva and lids: No swelling, erythema, or discharge     Ears, Nose, Mouth, and Throat   Hearing: Normal    Neck   Neck: No jugular venous distention, normal ROM and extension  No cervical adenopathy, trachea midline, neck supple  Results/Data  Diagnostic Studies Reviewed Vasc: I personally reviewed the films/images/results in the office today  My interpretation follows  Vascular Study Review Venous reflux study: No evidence of superficial venous incompetence  No evidence of deep venous incompetence  Future Appointments    Date/Time Provider Specialty Site   12/13/2016 10:45 AM JOO Pulido   Internal Medicine [facility] [place]     Signatures   Electronically signed by : Hector Moore DO; Nov 30 2016 11:01AM EST                       (Author)

## 2018-01-13 NOTE — MISCELLANEOUS
Message   Recorded as Task   Date: 10/11/2016 03:24 PM, Created By: Vinita Gil   Task Name: Care Coordination   Assigned To: [facility] surgery sched,Team   Regarding Patient: Fabrizio Virgen, Status: Active   Comment:    Radha Sanchez - 11 Oct 2016 3:24 PM     TASK CREATED  Caller: Alexis Patel, Care Coordinator; Care Coordination; [phone]    Martinez Mccloud from Dr Jose Farmer office LM on [facility] triage line today at 1121,states pt had injection on 10/3/16 (left SIJ inj) and still with pain, asking if pt should make OV or schedule another injection  Requesting we call pt at [phone]    DR Man Padorn, please advise  ***********   Cassidy Elizondo - 11 Oct 2016 4:10 PM     TASK REPLIED TO: Previously Assigned To [facility] [place] [facility],Team                      We had discussed left L4,5 mbb as well  This can be scheduled as a diagnostic test   Vinita Gil - 11 Oct 2016 4:11 PM     TASK REASSIGNED: Previously Assigned To JENNY Jimenez Em - 10 Nov 0501 1:40 PM     TASK EDITED                 Spoke with patient offered procedure and she agreed  Explained to patient Dr Man Padron was no longer with the office and she would be seeing Dr Fonseca Boys  She was okay with that  All pre procedure instructions were reviewed with patient   NPO 1 hour prior   No antibx   Needs      Patient agreed and I confirmed date, time and location        Active Problems    1  Abdominal pain, LLQ (left lower quadrant) (789 04) (R10 32)   2  Acute back pain with sciatica, left (724 3) (M54 42)   3  Acute left-sided low back pain with left-sided sciatica (724 2,724 3) (M54 42)   4  Aftercare following surgery of the musculoskeletal system (V58 78) (Z47 89)   5  Allergic rhinitis (477 9) (J30 9)   6  Anemia (285 9) (D64 9)   7  Anxiety (300 00) (F41 9)   8  Cervical cancer screening (V76 2) (Z12 4)   9  Chronic left-sided low back pain without sciatica (724 2,338 29) (M54 5,G89 29)   10  Complete tear of left rotator cuff (727 61) (M75 122)   11  Depression with anxiety (300 4) (F41 8)   12  Dermatomycosis (111 9) (B36 9)   13  Eczema of external ear, left (380 22) (H60 542)   14  Encounter for screening mammogram for malignant neoplasm of breast (V76 12)    (Z12 31)   15  Hypothyroidism (244 9) (E03 9)   16  Impacted cerumen of left ear (380 4) (H61 22)   17  Left bundle branch block (426 3) (I44 7)   18  Left rotator cuff tear (840 4) (M75 102)   19  Microscopic hematuria (599 72) (R31 29)   20  Need for prophylactic vaccination and inoculation against influenza (V04 81) (Z23)   21  Otitis externa (380 10) (H60 90)   22  Pain in joint of left shoulder (719 41) (M25 512)   23  Rotator cuff tendinitis, unspecified laterality (726 10) (M75 80)   24  Sacroiliitis (720 2) (M46 1)   25  Shoulder pain, right (719 41) (M25 511)   26  Spondylosis (721 90) (M47 9)   27  Surgical aftercare, injury or trauma (V58 43) (Z48 89)   28  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, bilateral (454 8) (I83 813)   29  Varicose veins without complication (883 1) (E32 82)   30  Vesicles (709 8) (R23 8)   31  Vitamin D deficiency (268 9) (E55 9)    Current Meds   1  ALPRAZolam 0 5 MG Oral Tablet; TAKE 1 TABLET TWICE DAILY AS NEEDED; Therapy: 62RRR9808 to (Evaluate:03Afk2580); Last Rx:07Jun2016 Ordered   2  Clotrimazole-Betamethasone 1-0 05 % External Cream; APPLY Y RUB IN A THIN FILM   HASTA AFFECTED AREAS TWICE A DAY(MORNING AND EVENING); Therapy: 35OPP4950 to (Anne Mondragon)  Requested for: 10Fmb6560; Last   Rx:23Jlk9920 Ordered   3  Diclofenac Sodium 50 MG Oral Tablet Delayed Release; TAKE 1 TABLET 3 TIMES DAILY   AS NEEDED; Therapy: 34SCG5124 to ([phone])  Requested for: 00Zga9085; Last   Rx:80Bur5295 Ordered   4  Fluticasone Propionate 50 MCG/ACT Nasal Suspension (Flonase); USE TWO SPRAYS   EACH NOSTRIL ONCE DAILY;    Therapy: 09RZN6802 to (Last Rx:13Jun2016)  Requested for: 13Jun2016 Ordered 5  Gralise Starter 300 & 600 MG Oral Miscellaneous; Take 1 tablet daily of 300 mg for 9 days   then take 600 mg tablets 1 tablet daily; Therapy: 00ABM0999 to (Last Rx:11Oct2016) Ordered   6  Hydrocortisone 2 5 % External Ointment; APPLY SPARINGLY TO THE AFFECTED   AREA(S) TWICE DAILY; Therapy: 04VKQ0481 to (Evaluate:36Pwd2421)  Requested for: 44KZH9328; Last   Rx:11Oct2016 Ordered   7  Levothyroxine Sodium 50 MCG Oral Tablet; CARIDAD 1 TABLETA POR VIA ORAL   DIARIAMENTETAKE ONE TABLET BY MOUTH DAILY; Therapy: 89CAO4256 to (Evaluate:87Gaj0937)  Requested for: 32JTK5772; Last   Rx:13Jun2016 Ordered   8  Methocarbamol 500 MG Oral Tablet; TAKE 1 TABLET TWICE DAILY AS DIRECTED; Therapy: 20YBO5113 to (Joellenliliana Marin)  Requested for: 70Arq0150; Last   Rx:21Iwd7352 Ordered   9  Neomycin-Polymyxin-HC 1 % Otic Solution; INSTILL 4 DROPS IN LEFT EAR 3-4 TIMES   DAILY; Therapy: 93JXC6693 to (Last Rx:60Uso1719)  Requested for: 77Omo0237; Status:   ACTIVE - Transmit to [facility] Ordered   10  Vitamin D3 44422 UNIT Oral Capsule; Take 1 tablet monthly; Therapy: 54PXZ1041 to (Last Rx:11Oct2016)  Requested for: 11Oct2016 Ordered    Allergies    1   No Known Drug Allergies    Signatures   Electronically signed by : Abiodun Rivera, ; Nov 10 2016  2:07PM EST                       (Author)

## 2018-01-14 NOTE — PROGRESS NOTES
Assessment    1  Never a smoker   2  Acute back pain with sciatica, left (724 3) (M54 42)   3  Allergic rhinitis (477 9) (J30 9)   4  Anxiety (300 00) (F41 9)    Plan  Acute back pain with sciatica, left    · Diclofenac Sodium 50 MG Oral Tablet Delayed Release; TAKE 1 TABLET 3 TIMES DAILY AS  NEEDED   · Methocarbamol 500 MG Oral Tablet; TAKE 1 TABLET TWICE DAILY AS DIRECTED   · TraMADol HCl - 50 MG Oral Tablet; TAKE 1 TABLET TWICE DAILY AS NEEDED   · * MRI LUMBAR SPINE WO CONTRAST; Status:Need Information - Financial Authorization; Requested  for:09Aug2016;    · *1 -  Physical Therapy Physical Therapy  Consult  Status: Active  Requested for: 09Aug2016  Care Summary provided  : Yes   · 1 - Yrn Purdy (Anesthesia) Physician Referral  Consult  Status: Active  Requested for:  [id number]  Care Summary provided  : Yes  Acute back pain with sciatica, left, Allergic rhinitis, Anxiety    · (1) CBC/PLT/DIFF; Status:Active; Requested for:09Aug2016;    · (1) COMPREHENSIVE METABOLIC PANEL; Status:Active; Requested for:09Aug2016;    · (1) C-REACTIVE PROTEIN; Status:Active; Requested for:09Aug2016;    · (1) GGT; Status:Active; Requested for:09Aug2016;    · (1) MAGNESIUM; Status:Active; Requested for:09Aug2016;    · (1) SED RATE; Status:Active; Requested for:09Aug2016;    · (1) T4, FREE; Status:Active; Requested for:09Aug2016;    · (1) TSH; Status:Active; Requested for:09Aug2016;    · (1) URINALYSIS (will reflex a microscopy if leukocytes, occult blood, protein or nitrites are not within  normal limits); Status:Active; Requested for:09Aug2016;    · (1) VITAMIN D 25-HYDROXY; Status:Active;  Requested for:09Aug2016;    · Follow-up visit in 2 months Evaluation and Treatment  Follow-up  Status: Hold For - Scheduling   Requested for: 09Aug2016  Acute left-sided low back pain with left-sided sciatica    · Cyclobenzaprine HCl - 5 MG Oral Tablet  Acute left-sided low back pain with left-sided sciatica, Complete tear of left rotator cuff    · Naproxen 500 MG Oral Tablet    Discussion/Summary  Discussion Summary:   Change her Naprosyn to Voltaren  Had changed her Flexeril to Robaxin  I added tramadol to take during the day so she can sleep at night  If she has severe pain  I order an MRI of the lumbosacral spine  I start her physical therapy  Dr Grace Christian consultation  Chief Complaint  Chief Complaint Free Text Note Form: 2 month follow up for low back pain +  c/o she still has the lower back pain on the left side but now it's worse  She is still using Flexeril and Naprosyn for the pain  Kiley Valladares was never a smoker  NEEDS COLONOSCOPY  History of Present Illness  HPI: Problem #1 low back pain persist she is not taking Naprosyn and Flexeril now she ran out of the prescription but when she was taking it it wasn't working so I switch her to both parent and Robaxin and I gave her tramadol for increasing pain I refer her to Dr Grace Christian for pain management  To see if he can inject her back and I'll order an MRI of the spine and the symptoms of suggestive of radiculopathy of the L4-L5 disc on the left her neurological exam is unremarkable she has good reflexes and good sensation but she has cross sign on the straight leg test on the right leg left leg about 40 degrees  before she has pain  She has earache F find no abnormality with her ears her tympanic membranes clear she has no nasal congestion throat is clear      Review of Systems  Complete-Female:   Constitutional: No fever, no chills, feels well, no tiredness, no recent weight gain or weight loss  Eyes: No complaints of eye pain, no red eyes, no eyesight problems, no discharge, no dry eyes, no itching of eyes  ENT: earache, but no complaints of earache, no loss of hearing, no nose bleeds, no nasal discharge, no sore throat, no hoarseness and as noted in HPI     Cardiovascular: No complaints of slow heart rate, no fast heart rate, no chest pain, no palpitations, no leg claudication, no lower extremity edema  Respiratory: No complaints of shortness of breath, no wheezing, no cough, no SOB on exertion, no orthopnea, no PND  Gastrointestinal: No complaints of abdominal pain, no constipation, no nausea or vomiting, no diarrhea, no bloody stools  Genitourinary: No complaints of dysuria, no incontinence, no pelvic pain, no dysmenorrhea, no vaginal discharge or bleeding  Musculoskeletal: No complaints of arthralgias, no myalgias, no joint swelling or stiffness, no limb pain or swelling and as noted in HPI  Integumentary: No complaints of skin rash or lesions, no itching, no skin wounds, no breast pain or lump  Neurological: No complaints of headache, no confusion, no convulsions, no numbness, no dizziness or fainting, no tingling, no limb weakness, no difficulty walking  Psychiatric: Not suicidal, no sleep disturbance, no anxiety or depression, no change in personality, no emotional problems  Endocrine: No complaints of proptosis, no hot flashes, no muscle weakness, no deepening of the voice, no feelings of weakness  Hematologic/Lymphatic: No complaints of swollen glands, no swollen glands in the neck, does not bleed easily, does not bruise easily  Active Problems    1  Abdominal pain, LLQ (left lower quadrant) (789 04) (R10 32)   2  Acute back pain with sciatica, left (724 3) (M54 42)   3  Acute left-sided low back pain with left-sided sciatica (724 2,724 3) (M54 42)   4  Aftercare following surgery of the musculoskeletal system (V58 78) (Z47 89)   5  Allergic rhinitis (477 9) (J30 9)   6  Anemia (285 9) (D64 9)   7  Anxiety (300 00) (F41 9)   8  Cervical cancer screening (V76 2) (Z12 4)   9  Complete tear of left rotator cuff (727 61) (M75 122)   10  Depression with anxiety (300 4) (F41 8)   11  Dermatomycosis (111 9) (B36 9)   12  Encounter for screening mammogram for malignant neoplasm of breast (V76 12) (Z12 31)   13  Hypothyroidism (244 9) (E03 9)   14  Impacted cerumen of left ear (380 4) (H61 22)   15  Left bundle branch block (426 3) (I44 7)   16  Left rotator cuff tear (840 4) (M75 102)   17  Microscopic hematuria (599 72) (R31 2)   18  Need for prophylactic vaccination and inoculation against influenza (V04 81) (Z23)   19  Otitis externa (380 10) (H60 90)   20  Pain in joint of left shoulder (719 41) (M25 512)   21  Rotator cuff tendinitis, unspecified laterality (726 10) (M75 80)   22  Shoulder pain, right (719 41) (M25 511)   23  Surgical aftercare, injury or trauma (V58 43) (Z48 89)   24  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, bilateral (454 8) (I83 813)   25  Varicose veins without complication (642 6) (Q07 50)   26  Vesicles (709 8) (R23 8)   27  Vitamin D deficiency (268 9) (E55 9)    Past Medical History    1  Adhesive capsulitis of left shoulder (726 0) (M75 02)   2  History of allergic rhinitis (V12 69) (Z87 09)   3  History of No significant past medical history   4  History of 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 for tuberculosis (795 51) (R76 11)    Surgical History    1  History of Arthroscopy Shoulder   2  History of Breast Surgery Lumpectomy   3  History of Hysterectomy   4  History of Shoulder Arthroscopy With Rotator Cuff Repair    Family History  Mother    1  Family history of Breast Cancer (V16 3)   2  Family history of Depression  Daughter    3  Family history of Child 1  At Age 28   2  Family history of Thyroid Disorder (V18 19)  Son    5  Family history of Child 1  At Age ___   10  Family history of Kidney Cancer (V16 51)  Sister    7  Family history of Depression   8  Family history of Diabetes Mellitus (V18 0)   9  Family history of Thyroid Disorder (V18 19)  Brother    10  Family history of Cancer   11  Family history of Sister  At Age ___  Family History    15  Family history of Cancer   13   Family history of Essential Hypertension    Social History    · Never a smoker   · Never Drank Alcohol   · Occupation:   · Working Full Time    Current Meds   1  ALPRAZolam 0 5 MG Oral Tablet; TAKE 1 TABLET TWICE DAILY AS NEEDED; Therapy: 45PIX5747 to (Evaluate:10Dbt0973); Last Rx:07Jun2016 Ordered   2  Clotrimazole-Betamethasone 1-0 05 % External Cream; APPLY Y RUB IN A THIN FILM HASTA   AFFECTED AREAS TWICE A DAY(MORNING AND EVENING); Therapy: 33DTN6633 to (Ruby Claude)  Requested for: 66Tot4536; Last Rx:24Jnr8454   Ordered   3  Cortisporin-TC 3 3-3-10-0 5 MG/ML SUSP; INSTILL 4 DROPS IN LEFT EAR 3-4 TIMES DAILY; Therapy: 30GSR3949 to 04 00 14 32 96)  Requested for: 20FQG8820; Last Rx:78Jnr9746   Ordered   4  Cyclobenzaprine HCl - 5 MG Oral Tablet; TAKE 1 TABLET AT BEDTIME AS NEEDED; Therapy: 84WCJ6737 to (Evaluate:95Ooe4342)  Requested for: 36AWW6586; Last Rx:07Jun2016   Ordered   5  Debrox 6 5 % Otic Solution; 5-10 drops in each ear twice a day; Therapy: 07BCN2624 to (Evaluate:24Jun2016)  Requested for: 82YCG5849; Last Rx:14Jun2016   Ordered   6  Fluticasone Propionate 50 MCG/ACT Nasal Suspension; USE TWO SPRAYS EACH NOSTRIL ONCE   DAILY; Therapy: 07GGI8814 to (Last Rx:13Jun2016)  Requested for: 13Jun2016 Ordered   7  Levothyroxine Sodium 50 MCG Oral Tablet; CARIDAD 1 TABLETA POR VIA ORAL DIARIAMENTETAKE   ONE TABLET BY MOUTH DAILY; Therapy: 98AAA8493 to (Evaluate:35Tdz4292)  Requested for: 32NYA6979; Last Rx:13Jun2016   Ordered   8  Naproxen 500 MG Oral Tablet; CARIDAD 1 TABLETA POR VIA ORAL DOS VECES AL DIATAKE ONE   TABLET BY MOUTH TWICE DAILY; Therapy: 10BGB4926 to (Evaluate:86Ina0179)  Requested for: 61BLP9015; Last Rx:07Jun2016   Ordered  Medication List Reviewed: The medication list was reviewed and updated today  Allergies    1   No Known Drug Allergies    Vitals  Vital Signs    Recorded: 33WOF5227 05:27MT   Systolic 445   Diastolic 70   Heart Rate 82   Respiration 15   Temperature 97 8 F   Height 4 ft 11 5 in   Weight 122 lb 4 oz   BMI Calculated 24 28   BSA Calculated 1 51     Physical Exam    Constitutional   General appearance: No acute distress, well appearing and well nourished  Eyes   Conjunctiva and lids: No swelling, erythema or discharge  Pupils and irises: Equal, round and reactive to light  Ears, Nose, Mouth, and Throat   External inspection of ears and nose: Normal     Otoscopic examination: Tympanic membranes translucent with normal light reflex  Canals patent without erythema  Oropharynx: Normal with no erythema, edema, exudate or lesions  Pulmonary   Respiratory effort: No increased work of breathing or signs of respiratory distress  Auscultation of lungs: Clear to auscultation  Cardiovascular   Palpation of heart: Normal PMI, no thrills  Auscultation of heart: Normal rate and rhythm, normal S1 and S2, without murmurs  Examination of extremities for edema and/or varicosities: Normal     Carotid pulses: Normal     Abdomen   Abdomen: Non-tender, no masses  Liver and spleen: No hepatomegaly or splenomegaly  Lymphatic   Palpation of lymph nodes in neck: No lymphadenopathy  Musculoskeletal   Gait and station: Normal     Digits and nails: Normal without clubbing or cyanosis  Inspection/palpation of joints, bones, and muscles: Abnormal   Palpation - lower mid-lumbar tenderness  Neurologic   Cranial nerves: Cranial nerves 2-12 intact  Reflexes: 2+ and symmetric  Sensation: No sensory loss  Able to toe walk she has difficulty and ankle walk good reflexes she has cross leg signs and raising the right leg which is suspect more of a disc and sciatica  Results/Data  PHQ-2 Adult Depression Screening 45Ipd8545 10:34AM User, [facility]     Test Name Result Flag Reference   PHQ-2 Adult Depression Score 0     Over the last two weeks, how often have you been bothered by any of the following problems?   Little interest or pleasure in doing things: Not at all - 0  Feeling down, depressed, or hopeless: Not at all - 0   PHQ-2 Adult Depression Screening Negative         Future Appointments    Date/Time Provider Specialty Site   11/30/2016 10:45 AM Alistair Garcia, DO Vascular Surgery [facility] [address] [place] [address]     Signatures   Electronically signed by : JOO Fernandez ; Aug  9 2016 11:32AM EST                       (Author)

## 2018-01-15 VITALS
DIASTOLIC BLOOD PRESSURE: 60 MMHG | HEART RATE: 76 BPM | WEIGHT: 122.13 LBS | SYSTOLIC BLOOD PRESSURE: 110 MMHG | TEMPERATURE: 98.1 F | RESPIRATION RATE: 14 BRPM | BODY MASS INDEX: 23.98 KG/M2 | HEIGHT: 60 IN

## 2018-01-15 NOTE — PROGRESS NOTES
Assessment    1  History of Shoulder Arthroscopy With Rotator Cuff Repair   2  Pain in joint of left shoulder (559 41) (M25 512)    Plan  Pain in joint of left shoulder    · Follow-up PRN Evaluation and Treatment  Follow-up  Status: Complete  Done:  38KUL0442 02:16PM    Discussion/Summary    The patient demonstrates a much better examination pain wise and strength wise then when I first met her before her revision rotator cuff repair  She has progressed nicely and does have a postoperative MRI documenting a healed rotator cuff repair, this is consistent with her examination  Unfortunately she still has pain and limitations which are precluding her from returning to her preinjury level of function  Since she is a year out from her revision surgery she is maximally medically improved in my opinion and if required a functional capacity evaluation could be obtained to define her permanent limitations  She feels she may have already had this study but is unclear and she is going to check with her  whether she requires another study  I am encourage that she has sought a new profession as clearly her limitations will prevent her from continuing her job as a  and instead I would focus on less manual labor type activities  Even though she has healed her rotator cuff repair she clearly has residual from her injury as demonstrated by her examination and pain with resisted external rotation and abducted testing  If she is to obtain a functional capacity evaluation I would be happy to see her afterwards to review those results, otherwise she will follow-up on an as-needed basis  The treatment plan was reviewed with the patient/guardian  The patient/guardian understands and agrees with the treatment plan      Chief Complaint    1  Shoulder Pain    History of Present Illness  Patient returns for follow-up of her revision rotator cuff repair of her left shoulder   She is now approximately one year out from my revision surgery, she did have a few surgeries before I became involved in her care  She is significantly better than she was a for eye intervene surgically but she still is quite limited  She has been unable to return to her preinjury level of function as a  due to the pain weakness and pain in her left shoulder  She again states it is painful with overhead activity and she cannot lift anything more than a few pounds without causing pain in the left shoulder  The pain she has is sharp is localized to the left proximal humerus, worse activity relieved by rest, sharp in intensity and intermittent in timing  No numbness or tingling or fevers and chills associated with this  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  Review of Systems    Constitutional: No fever, no chills, feels well, no tiredness, no recent weight gain or loss  Eyes: No complaints of eyesight problems, no red eyes  ENT: no loss of hearing, no nosebleeds, no sore throat  Cardiovascular: No complaints of chest pain, no palpitations, no leg claudication or lower extremity edema  Respiratory: no compliants of shortness of breath, no wheezing, no cough  Gastrointestinal: no complaints of abdominal pain, no constipation, no nausea or diarrhea, no vomiting, no bloody stools  Genitourinary: no complaints of dysuria, no incontinence  Musculoskeletal: as noted in HPI  Integumentary: no complaints of skin rash or lesion, no itching or dry skin, no skin wounds  Neurological: no complaints of headache, no confusion, no numbness or tingling, no dizziness  Endocrine: No complaints of muscle weakness, no feelings of weakness, no frequent urination, no excessive thirst    Psychiatric: No suicidal thoughts, no anxiety, no feelings of depression  ROS reviewed  Active Problems    1  Abdominal pain, LLQ (left lower quadrant) (138 18) (R10 32)   2   Aftercare following surgery of the musculoskeletal system (V58 78) (Z47 89)   3  Allergic rhinitis (477 9) (J30 9)   4  Anemia (285 9) (D64 9)   5  Anxiety (300 00) (F41 9)   6  Cervical cancer screening (V76 2) (Z12 4)   7  Complete tear of left rotator cuff (727 61) (M75 122)   8  Depression with anxiety (300 4) (F41 8)   9  Dermatomycosis (111 9) (B36 9)   10  Encounter for screening mammogram for malignant neoplasm of breast (V76 12)    (Z12 31)   11  Hypothyroidism (244 9) (E03 9)   12  Impacted cerumen of left ear (380 4) (H61 22)   13  Left bundle branch block (426 3) (I44 7)   14  Left rotator cuff tear (840 4) (M75 102)   15  Lumbago With Sciatica (724 3)   16  Microscopic hematuria (599 72) (R31 2)   17  Need for prophylactic vaccination and inoculation against influenza (V04 81) (Z23)   18  Otitis externa (380 10) (H60 90)   19  Pain in joint of left shoulder (719 41) (M25 512)   20  Rotator cuff tendinitis, unspecified laterality (726 10) (M75 80)   21  Shoulder pain, right (719 41) (M25 511)   22  Surgical aftercare, injury or trauma (V58 43) (Z48 89)   23  Varicose Veins Of Lower Extremities (454 9)   24  Vitamin D deficiency (268 9) (E55 9)    Past Medical History    · Adhesive capsulitis of left shoulder (726 0) (M75 02)   · History of allergic rhinitis (V12 69) (Z87 09)   · History of No significant past medical history   · History of 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 for tuberculosis (795 51) (R76 11)    The active problems and past medical history were reviewed and updated today  Surgical History    · History of Arthroscopy Shoulder   · History of Breast Surgery Lumpectomy   · History of Hysterectomy   · History of Shoulder Arthroscopy With Rotator Cuff Repair    The surgical history was reviewed and updated today         Family History    · Family history of Breast Cancer (V16 3)   · Family history of Depression    · Family history of Child 1  At Age 28   · Family history of Thyroid Disorder (V18 19)    · Family history of Child 1  At Age ___   · Family history of Kidney Cancer (V16 51)    · Family history of Depression   · Family history of Diabetes Mellitus (V18 0)   · Family history of Thyroid Disorder (V18 19)    · Family history of Cancer   · Family history of Sister  At Age ___    · Family history of Cancer   · Family history of Essential Hypertension    The family history was reviewed and updated today  Social History    · Never A Smoker   · Never Drank Alcohol   · Occupation:   · Working Full Time  The social history was reviewed and updated today  The social history was reviewed and is unchanged  Current Meds   1  ALPRAZolam 0 5 MG Oral Tablet; TAKE 1 TABLET TWICE DAILY AS NEEDED; Therapy: 18RAJ0012 to (Evaluate:90Qvk7285); Last Rx:56Foj1423 Ordered   2  Clotrimazole-Betamethasone 1-0 05 % External Cream; APPLY Y RUB IN A THIN FILM   HASTA AFFECTED AREAS TWICE A DAY(MORNING AND EVENING); Therapy: 53UWH9602 to (Jose R Schaefer)  Requested for: 63Gju4012; Last   Rx:76Rxi6502 Ordered   3  Cortisporin-TC 3 3-3-10-0 5 MG/ML Otic Suspension; INSTILL 4 DROPS IN LEFT EAR   3-4 TIMES DAILY; Therapy: 48JWB2805 to 60-77-74-40)  Requested for: 17BKM9932; Last   Rx:2014 Ordered   4  Fluticasone Propionate 50 MCG/ACT Nasal Suspension (Flonase); USE TWO SPRAYS   EACH NOSTRIL ONCE DAILY; Therapy: 76MRK2281 to (Evaluate:2016)  Requested for: 87Igy6087; Last   Rx:93Icc2525 Ordered   5  Levothyroxine Sodium 50 MCG Oral Tablet; CARIDAD 1 TABLETA POR VIA ORAL   DIARIAMENTETAKE ONE TABLET BY MOUTH DAILY; Therapy: 83JCL8805 to (Evaluate:2016)  Requested for: 03Wpb1376; Last   Rx:00Phx4376 Ordered   6  Naproxen 500 MG Oral Tablet; CARIDAD 1 TABLETA POR VIA ORAL DOS VECES AL   DIATAKE ONE TABLET BY MOUTH TWICE DAILY; Therapy: 10QYJ9426 to (Evaluate:2016)  Requested for: 85Dsl4459; Last   Rx:46Rdh0253 Ordered   7   TraMADol HCl - 50 MG Oral Tablet; TAKE 1 TO 2 TABLETS EVERY 6 HOURS AS NEEDED FOR PAIN;   Therapy: 77WEJ0972 to (Evaluate:05Jun2015); Last Rx:71Tiy0517 Ordered   8  Vitamin D3 2000 UNIT Oral Capsule; take 1 capsule daily; Therapy: 06BSA6183 to (Last Rx:13Jan2014) Ordered    The medication list was reviewed and updated today  Allergies    1  No Known Drug Allergies    Vitals   Recorded: 04Apr2016 01:44PM   Heart Rate 98   Systolic 121, Sitting   Diastolic 72, Sitting   Weight 119 lb 4 oz   BMI Calculated 23 68   BSA Calculated 1 49     Physical Exam    Tender about the proximal humerus, improved from her previous visits  ROM: equivalent both sides  Motor: Normal except as noted: Full strength but pain with resisted, 5/5 abduction and 5/5 external rotation  Special Tests: Negative except equivocal Painful Arc, equivocal Arenas test and equivocal Neer test, but negative Drop Arm test, negative Empty Can test, negative Lift Off test, negative Yergason's test, negative Speed's test, negative Belly Press test, negative Bear Hug test and negative Cross Body Adduction test  Left Shoulder Appearance[de-identified] Normal except Well healed incisions  Tenderness: None except the    Constitutional - General appearance: Normal    Musculoskeletal - Gait and station: Normal    Cardiovascular - Pulses: Normal    Skin - Skin and subcutaneous tissue: Normal    Psychiatric - Orientation to person, place, and time: Normal  Mood and affect: Normal    Eyes   Conjunctiva and lids: Normal        Future Appointments    Date/Time Provider Specialty Site   06/21/2016 10:15 AM JOO Hogan   [facility]     Signatures   Electronically signed by : Harlene Kanner, M D ; Apr 4 2016  2:19PM EST                       (Author)

## 2018-01-15 NOTE — MISCELLANEOUS
Message   Date: 31 Mar 2017 3:21 PM EST, Recorded By: Susie Nageotte For: Bekah Alessia   Caller: Odalis Caal, Self   Phone: [phone] (Home), [phone] (Work)   PC from PT re: her Cholesterol Level  She thought it was high & should be on medication  I spoke to DR Guillermo Newberry who had us run the Cholesterol Formula  Her result came back & her #'s are very good no Medication needed at present  PT notified & understood  Active Problems    1  Abdominal pain, LLQ (left lower quadrant) (789 04) (R10 32)   2  Acute back pain with sciatica, left (724 3) (M54 42)   3  Acute left-sided low back pain with left-sided sciatica (724 2,724 3) (M54 42)   4  Aftercare following surgery of the musculoskeletal system (V58 78) (Z47 89)   5  Allergic rhinitis (477 9) (J30 9)   6  Anemia (285 9) (D64 9)   7  Anxiety (300 00) (F41 9)   8  Cervical cancer screening (V76 2) (Z12 4)   9  Chronic left-sided low back pain without sciatica (724 2,338 29) (M54 5,G89 29)   10  Colon cancer screening (V76 51) (Z12 11)   11  Complete tear of left rotator cuff (727 61) (M75 122)   12  Depression with anxiety (300 4) (F41 8)   13  Dermatomycosis (111 9) (B36 9)   14  Eczema of external ear, left (380 22) (H60 542)   15  Encounter for screening mammogram for malignant neoplasm of breast (V76 12)    (Z12 31)   16  Hyperlipidemia (272 4) (E78 5)   17  Hypothyroidism (244 9) (E03 9)   18  Impacted cerumen of left ear (380 4) (H61 22)   19  Left bundle branch block (426 3) (I44 7)   20  Left rotator cuff tear (840 4) (M75 102)   21  Microscopic hematuria (599 72) (R31 29)   22  Need for prophylactic vaccination and inoculation against influenza (V04 81) (Z23)   23  Otitis externa (380 10) (H60 90)   24  Pain in joint of left shoulder (719 41) (M25 512)   25  Rotator cuff tendinitis, unspecified laterality (726 10) (M75 80)   26  Sacroiliitis (720 2) (M46 1)   27  Screening for cholesterol level (V77 91) (Z13 220)   28   Shoulder pain, right (719 41) (M25 511)   29  Spider veins (448 1) (I78 1)   30  Spondylosis (721 90) (M47 9)   31  Surgical aftercare, injury or trauma (V58 43) (Z48 89)   32  Symptomatic reticular veins, bilateral (454 8) (I83 893)   33  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, bilateral (454 8) (I83 813)   34  Varicose veins without complication (697 9) (L37 05)   35  Vesicles (709 8) (R23 8)   36  Vitamin D deficiency (268 9) (E55 9)    Current Meds   1  ALPRAZolam 0 5 MG Oral Tablet; TAKE 1 TABLET TWICE DAILY AS NEEDED; Therapy: 67TLM6608 to (Evaluate:02Mar2017); Last Rx:31Jan2017 Ordered   2  Diclofenac Sodium 50 MG Oral Tablet Delayed Release; TAKE 1 TABLET 3 TIMES DAILY   AS NEEDED; Therapy: 76Exm9128 to (Mitch Mart)  Requested for: 79NNG7552; Last   Rx:30Mar2017 Ordered   3  Levothyroxine Sodium 50 MCG Oral Tablet; TOME ONE TABLET BY MOUTH   DAILYTAKE ONE TABLET BY MOUTH DAILY; Therapy: 64RGA9742 to (Evaluate:19Mar2017)  Requested for: 96Fmj4437; Last   Rx:92Dvj6455 Ordered    Allergies    1   No Known Drug Allergies    Signatures   Electronically signed by : JOO Reyna ; Apr  3 2017  6:58AM EST                       (Author)

## 2018-01-15 NOTE — RESULT NOTES
Message   Recorded as Task   Date: 02/24/2017 03:22 PM, Created By: Vandana James   Task Name: Follow Up   Assigned To: Cherry Armenta   Regarding Patient: Rosalba Berger, Status: In Progress   Claudia Swenson - 24 Feb 2017 3:22 PM     TASK CREATED  The stools were Hemoccult-negative good news   [facility] - 02 Mar 2017 10:46 AM     TASK EDITED  I left a message for [employer] to call back  [facility] - 17 Mar 2017 10:46 AM     TASK IN PROGRESS   [facility] - 02 Mar 2017 5:11 PM     TASK EDITED  I reviewed the result with [employer]          Verified Results  (1) OCCULT BLOOD, FECAL IMMUNOCHEMICAL TEST 36Qsz3517 02:53PM Vandana James   TW Order Number: [id number]     Test Name Result Flag Reference   OCCULT BLD, FECAL IMMUNOLOGICAL Negative  Negative   Performed by Fecal Immunochemical Test

## 2018-01-16 NOTE — CONSULTS
Assessment    1  Chronic left-sided low back pain without sciatica (724 2,338 29) (M54 5,G89 29)   2  Sacroiliitis (720 2) (M46 1)    Plan  Chronic left-sided low back pain without sciatica, Sacroiliitis    · Follow-up PRN Evaluation and Treatment  Follow-up  Status: Complete  Done:  17YVL7932 02:09PM   Ordered; For: Chronic left-sided low back pain without sciatica, Sacroiliitis; Ordered By: Rohini Jean-Baptiste Performed:  Due: 47UKL0918    Discussion/Summary    29-year-old woman with chronic lower back pain and intermittent left leg pain  I reviewed her history, physical examination and imaging in detail with her today  A total of 30 minutes is spent with the patient which more and 50% was spent in direct counseling coordination of care  Her back pain seems to be most consistent with facetogenic pain and possibly sacral ileitis  Her leg symptoms are not typical for sciatica insofar they do not fit a particular radicular distribution  There is no obvious compression on neural structures on MRI of the lumbar spine  I explained that surgery for back pain is generally less rewarding  I would not recommend any surgical intervention  She is scheduled to see a pain specialist later this month  Left SI joint injection and possibly facet injections may be helpful  However, a course of membranes stabilizing agents or short course of oral strides if not already attempted, may be reasonable  She'll discuss this further with her PCP  All her questions were answered to her satisfaction  At this point in time, no plans for further follow-up through this office  She is in agreement with this course of action  Chief Complaint  Left-sided lower back pain  History of Present Illness  Pleasant 29-year-old woman who first developed left-sided lower back pain in March 2016 without inciting event  This is become progressively worse   Her graft she currently describes Aching sensation in the left lower back which can radiate into the buttock and hip  She rates it as 3 out of 10 when sitting but becomes acutely worse when going from sitting to standing and with walking  At the pain becomes more severe should begin stenosis diffuse pain in the left leg along with weakness and numbness  She denies any pain, weakness or numbness in her left leg when sitting or lying down  She denies any pain, weakness or numbness in her right leg  She denies any difficulties with bowel and bladder function or change in perineal sensation  Her current pain medications are listed below  She is unsure if she has tried membrane stabilizing agents before  She is scheduled to see a pain specialist later this month discuss ongoing intervention  Physical therapy has been helpful as well  She presents today with recent MRI of the lumbar spine and to discuss her surgical options  Review of Systems    Constitutional: feeling tired, but no fever and no chills  Eyes: no eyesight problems  ENT: earache, but no sore throat, no hearing loss and no hoarseness  Cardiovascular: no chest pain  Respiratory: no shortness of breath  Gastrointestinal: no abdominal pain, no nausea and no vomiting  Genitourinary: no incontinence  Musculoskeletal: limb pain    The patient presents with complaints of lower back arthralgias, radiating to the left upper leg and left lower leg Risk Factors: no lumbar disc surgery and no cervical disc surgery (Left-sided LBP  Scheduled to see Dr Jeremy Lagos on 9/22/16 for initial consultation  PT has been helping a little bit; 2x/week  Is able to sit a little longer than previous  Unable to walk >100 yards  Pain is worst with walking  Walking she scales her pain 8/10  Bending or reaching she scales her pain 10/10  )  Integumentary: no rashes and no itching     Neurological: tingling, limb weakness, difficulty walking and memory loss, but no numbness and no confusion    The patient presents with complaints of right > left no headache (HA at night, will wake her from sleep)  Psychiatric: sleep disturbances, depression and imbalance, but no anxiety  Endocrine: night sweats, but no hot flashes  Hematologic/Lymphatic: no current aspirin or fish oil, but no swollen glands, no tendency for easy bleeding, no tendency for easy bruising and no swollen glands in the neck  ROS reviewed  Active Problems    1  Abdominal pain, LLQ (left lower quadrant) (789 04) (R10 32)   2  Acute back pain with sciatica, left (724 3) (M54 42)   3  Acute left-sided low back pain with left-sided sciatica (724 2,724 3) (M54 42)   4  Aftercare following surgery of the musculoskeletal system (V58 78) (Z47 89)   5  Allergic rhinitis (477 9) (J30 9)   6  Anemia (285 9) (D64 9)   7  Anxiety (300 00) (F41 9)   8  Cervical cancer screening (V76 2) (Z12 4)   9  Complete tear of left rotator cuff (727 61) (M75 122)   10  Depression with anxiety (300 4) (F41 8)   11  Dermatomycosis (111 9) (B36 9)   12  Encounter for screening mammogram for malignant neoplasm of breast (V76 12)    (Z12 31)   13  Hypothyroidism (244 9) (E03 9)   14  Impacted cerumen of left ear (380 4) (H61 22)   15  Left bundle branch block (426 3) (I44 7)   16  Left rotator cuff tear (840 4) (M75 102)   17  Microscopic hematuria (599 72) (R31 2)   18  Need for prophylactic vaccination and inoculation against influenza (V04 81) (Z23)   19  Otitis externa (380 10) (H60 90)   20  Pain in joint of left shoulder (719 41) (M25 512)   21  Rotator cuff tendinitis, unspecified laterality (726 10) (M75 80)   22  Shoulder pain, right (719 41) (M25 511)   23  Surgical aftercare, injury or trauma (V58 43) (Z48 89)   24  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, bilateral (454 8) (I83 813)   25  Varicose veins without complication (319 3) (I68 53)   26  Vesicles (709 8) (R23 8)   27  Vitamin D deficiency (268 9) (E55 9)    Past Medical History    1  Adhesive capsulitis of left shoulder (726 0) (M85 02)   2   History of allergic rhinitis (V12 69) (Z87 09)   3  History of No significant past medical history   4  History of 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 for tuberculosis (795 51) (R76 11)    The active problems and past medical history were reviewed and updated today  Surgical History    1  History of Arthroscopy Shoulder   2  History of Breast Surgery Lumpectomy   3  History of Hysterectomy   4  History of Shoulder Arthroscopy With Rotator Cuff Repair    The surgical history was reviewed and updated today  Family History  Mother    1  Family history of Breast Cancer (V16 3)   2  Family history of Depression  Daughter    3  Family history of Child 1  At Age 28   2  Family history of Thyroid Disorder (V18 19)  Son    5  Family history of Child 1  At Age ___   10  Family history of Kidney Cancer (V16 51)  Sibling    7  Family history of Cancer  Sister    8  Family history of Depression   9  Family history of Diabetes Mellitus (V18 0)   10  Family history of Thyroid Disorder (V18 19)  Brother    6  Family history of Cancer   12  Family history of Sister  At Age ___  Maternal Grandmother    15  Family history of heart attack (V17 3) (Z82 49)   14  Family history of liver cancer (V16 0) (Z80 0)  Maternal Grandfather    13  Family history of stroke (V17 1) (Z82 3)  Family History    12  Family history of Cancer   17  Family history of Essential Hypertension    The family history was reviewed and updated today  Social History    ·    · Four children   · Functioning activity level   · High school or GED   · Living situation   · Never a smoker   · Never Drank Alcohol   · No drug use   · Not currently employed   · Occupation:   · Working Full Time  The social history was reviewed and updated today  Current Meds   1  ALPRAZolam 0 5 MG Oral Tablet; TAKE 1 TABLET TWICE DAILY AS NEEDED; Therapy: 63XZF8420 to (Evaluate:15Tor3810); Last Rx:2016 Ordered   2   Clotrimazole-Betamethasone 1-0 05 % External Cream; APPLY Y RUB IN A THIN FILM   HASTA AFFECTED AREAS TWICE A DAY(MORNING AND EVENING); Therapy: 93BED9047 to (Fátima Rounds)  Requested for: 06Aug2015; Last   Rx:06Aug2015 Ordered   3  Diclofenac Sodium 50 MG Oral Tablet Delayed Release; TAKE 1 TABLET 3 TIMES DAILY   AS NEEDED; Therapy: 94QWR2460 to (Bradley Kokhanok)  Requested for: 09Aug2016; Last   Rx:09Aug2016 Ordered   4  Fluticasone Propionate 50 MCG/ACT Nasal Suspension; USE TWO SPRAYS EACH   NOSTRIL ONCE DAILY; Therapy: 62XSD6966 to (Last Rx:13Jun2016)  Requested for: 13Jun2016 Ordered   5  Levothyroxine Sodium 50 MCG Oral Tablet; CARIDAD 1 TABLETA POR VIA ORAL   DIARIAMENTETAKE ONE TABLET BY MOUTH DAILY; Therapy: 84PPV8209 to (Evaluate:71Nny8371)  Requested for: 68XFU6923; Last   Rx:13Jun2016 Ordered   6  Methocarbamol 500 MG Oral Tablet; TAKE 1 TABLET TWICE DAILY AS DIRECTED; Therapy: 83EMH7394 to (Bradley Kokhanok)  Requested for: 09Aug2016; Last   Rx:09Aug2016 Ordered   7  Neomycin-Polymyxin-HC 1 % Otic Solution; INSTILL 4 DROPS IN LEFT EAR 3-4 TIMES   DAILY; Therapy: 59UGL3949 to (Last Rx:17Aug2016)  Requested for: 17Aug2016; Status:   ACTIVE - Transmit to Pharmacy - Awaiting Verification Ordered   8  TraMADol HCl - 50 MG Oral Tablet; TAKE 1 TABLET TWICE DAILY AS NEEDED; Therapy: 09Aug2016 to (Evaluate:03Omd0542); Last Rx:09Aug2016 Ordered    The medication list was reviewed and updated today  Allergies    1  No Known Drug Allergies    Vitals  Vital Signs    Recorded: 96KIL8461 87:43UJ   Systolic 801, LUE, Sitting   Diastolic 86, LUE, Sitting   Heart Rate 80   Respiration 16   Temperature 98 2 F, Tympanic   Height 5 ft    Weight 112 lb    BMI Calculated 21 87   BSA Calculated 1 46     Physical Exam     Constitutional Patient appears the stated age  No signs of acute distress present  No involuntary movement  Patient is cooperative     Musculo: Spine Lumbar/Sacral Spine: Normal   Randolph palpation over left SI joint  Randi's test positive on the left  Tenderness to palpation over left greater trochanter  Skin warm and dry  No rashes, lesions or ecchymosis  Neurologic - Mental Status: Alert and Oriented x3  Mood and affect: Affect is normal   Memory is intact  Motor System - Lower Extremities: Heel walk and toe walk intact and with out signs of paresis  5/5 power in lower extremities  Muscle tone: Normal bilaterally  Muscle Bulk: Normal bilaterally  Reflexes: Biceps reflexes are intact bilaterally  Brachioradialis reflexes are intact bilaterally  Achilles reflexes are 2+ bilaterally  Babinski's reflex is down going bilaterally  Garvey's sign is absent bilaterally  Deep tendon reflexes: 1+ right patella, 1+ left patella, 1+ right ankle jerk and 1+ left ankle jerkno ankle clonus on the right and no ankle clonus on the left  Superficial/Primitive Reflexes: Babinski reflex absent on the right and Babinski reflex absent on the left  Coordination: Finger to nose was normal   Coordination: normal balance, negative Romberg's sign and no dysdiadochokinesia  Sensory: Sensation grossly intact to light touch  Gait and Station: Able to heal toe gait and Romberg negative  Results/Data    I personally reviewed the mri in detail with the patient  MRI Review MRI of the lumbar spine without contrast dated August 16, 2016  Lumbar lordosis  Mild degenerative disc disease at L5-S1  Facet arthropathy more prominently on the right than the left at L4-5 and L5-S1  No significant compression or neural structures  Intrathecal contents unremarkable  Future Appointments    Date/Time Provider Specialty Site   09/22/2016 02:45 PM Sagar Conley MD Pain Management [facility]   11/30/2016 10:45 AM Randy Garcia DO Vascular [facility]  [place]   10/11/2016 10:30 AM JOO Fay   Internal Medicine [facility]     Signatures   Electronically signed by : JOO Polanco ; Sep  9 2016 2:11PM EST                       (Author)

## 2018-01-16 NOTE — PROGRESS NOTES
Assessment    1  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, bilateral (454 8) (Q32 [id number])    Plan    1  Apply moisturizing cream or lotion several times a day ; Status:Complete;   Done:   [id number]   2  Begin or continue regular aerobic exercise  Gradually work up to at least count1   sessions of dur1 of exercise a week ; Status:Complete;   Done: 23DML7930   3  Keep your leg elevated whenever you can to decrease swelling and increase circulation ;   Status:Complete;   Done: 02NZK4760   4  Restrict the salt in your diet by avoiding highly salted foods ; Status:Complete;   Done:   10GYQ3787   5  Support stockings can help keep the blood from pooling in the small veins in your feet   and legs ; Status:Complete;   Done: 75NAP7060   6  Gradient compression stocking, below knee, 20-30mm Hg, each; Status:Complete;     Done: 71JBY9523   7  VAS LOWER LIMB VENOUS DUPLEX STUDY, WITH REFLUX ASSESSMENT, COMPLETE   BILATERAL; Status:Active; Requested for:08Nov2016;    8  Follow-up visit in 3 months Evaluation and Treatment  Follow-up  Status: Complete    Done: 03ZNE9875    Discussion/Summary  Discussion Summary: This patient has bilateral lower extremity reticular varicosities which cause significant itching, as well as aching, tired, heavy pain and intermittent edema  We discussed the pathophysiology of venous disease  She will begin a course of conservative management to include the daily use of 20-30 mmHg gradient compression stockings, elevation, regular physical activity, and maintenance of a healthy weight  She will have a venous study to assess for reflux and return to the office with a physician for review  Chief Complaint  Chief Complaint Free Text Note Form: "I am here to get my legs checked "         History of Present Illness  Varicose Veins [facility]: The patient is being seen for a consultation regarding varicose veins     Symptoms:  bilateral dilated veins, bilateral discolored veins, bilateral leg swelling, bilateral spider veins, itching bilaterally and bilateral leg pain, but no bulging veins, no muscle cramps, no stasis dermatitis, no cellulitis, no hyperpigmentation, no venous ulcers, no dry skin and no bleeding  The patient describes the pain as aching, itching, burning and heavy  This patient has no history of DVT, pulmonary embolism, superficial venous thrombosis, or a hypercoagulable state  Evaluation and Treatment History: This patient has had no prior surgical treatment of the venous system  This patient is using periodic leg elevation   Efficacy of conservative treatment: The conservative measures have helped maintain this patient's symptoms but have not controlled them completely  Free Text HPI: Ms Olena Ortiz is new to our practice  She is here for evaluation of bilateral lower extremity varicose veins  She says the veins began in her 45s, but lately they have been causing her significant itching pain  The itching pain is worse to the left leg and localized over a cluster of reticular varicosities  She complains that her legs feel heavy, tired and achy on a daily basis  She reports intermittent ankle edema  She tried to wear compression stockings in the remote past, but stopped because she didn't like the way they felt on her skin  She elevates periodically, which she says helps her symptoms  Review of Systems  Complete Female - Vasc:   Constitutional: No fever or chills, feels well, no tiredness, no recent weight gain or weight loss  Eyes: dryness of the eyes, no sudden vision loss and no double vision, but no eye pain, no eyesight problems, eyes not red, no purulent discharge from the eyes, no itching of the eyes, wears glasses and blurred vision  ENT: no loss of hearing, no nosebleeds, no hoarseness  Cardiovascular: no bleeding veins, but regular heart rate, no chest pain, no intermittent leg claudication, painful veins, no palpitations and leg pain with walking  Respiratory: No sob, no wheezing, no cough, no sob with exertion, no orthopnea  Gastrointestinal: No nausea, No vomiting, no diarrhea, no blood in stool  Genitourinary: no dysuria, no Hematuria,no urinary incontinence  Musculoskeletal: no lumbar pain, limb pain, joint stiffness and limb swelling, but no joint swelling and no myalgias  Integumentary: no rash, no lesions, no wounds, no ulcer  Neurological: no dementia, no headache, no numbness, no limb weakness, no dizziness, no difficulty walking  Psychiatric: no depression, no mood disorders, no anxiety  Hematologic/Lymphatic: no bleeding disorder, no easy bruising  ROS Reviewed:   ROS reviewed  Active Problems    1  Abdominal pain, LLQ (left lower quadrant) (789 04) (R10 32)   2  Acute left-sided low back pain with left-sided sciatica (724 2,724 3) (M54 42)   3  Aftercare following surgery of the musculoskeletal system (V58 78) (Z47 89)   4  Allergic rhinitis (477 9) (J30 9)   5  Anemia (285 9) (D64 9)   6  Anxiety (300 00) (F41 9)   7  Cervical cancer screening (V76 2) (Z12 4)   8  Complete tear of left rotator cuff (727 61) (M75 122)   9  Depression with anxiety (300 4) (F41 8)   10  Dermatomycosis (111 9) (B36 9)   11  Encounter for screening mammogram for malignant neoplasm of breast (V76 12)    (Z12 31)   12  Hypothyroidism (244 9) (E03 9)   13  Impacted cerumen of left ear (380 4) (H61 22)   14  Left bundle branch block (426 3) (I44 7)   15  Left rotator cuff tear (840 4) (M75 102)   16  Lumbago With Sciatica (724 3)   17  Microscopic hematuria (599 72) (R31 2)   18  Need for prophylactic vaccination and inoculation against influenza (V04 81) (Z23)   19  Otitis externa (380 10) (H60 90)   20  Pain in joint of left shoulder (719 41) (M25 512)   21  Rotator cuff tendinitis, unspecified laterality (726 10) (M75 80)   22  Shoulder pain, right (719 41) (M25 511)   23  Surgical aftercare, injury or trauma (V58 43) (Z48 89)   24   Varicose veins without complication (373 7) (M64 46)   25  Vesicles (709 8) (R23 8)   26  Vitamin D deficiency (268 9) (E55 9)    Past Medical History    1  Adhesive capsulitis of left shoulder (726 0) (M75 02)   2  History of allergic rhinitis (V12 69) (Z87 09)   3  History of No significant past medical history   4  History of 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 for tuberculosis (795 51) (R76 11)  Active Problems And Past Medical History Reviewed: The active problems and past medical history were reviewed and updated today  Surgical History  Surgical History Reviewed: The surgical history was reviewed and updated today  Family History  Mother    1  Family history of Breast Cancer (V16 3)   2  Family history of Depression  Daughter    3  Family history of Child 1  At Age 28   2  Family history of Thyroid Disorder (V18 19)  Son    5  Family history of Child 1  At Age ___   10  Family history of Kidney Cancer (V16 51)  Sister    7  Family history of Depression   8  Family history of Diabetes Mellitus (V18 0)   9  Family history of Thyroid Disorder (V18 19)  Brother    10  Family history of Cancer   11  Family history of Sister  At Age ___  Family History    15  Family history of Cancer   13  Family history of Essential Hypertension  Family History Reviewed: The family history was reviewed and updated today  Social History    · Never A Smoker   · Never Drank Alcohol   · Occupation:   · Working Full Time  Social History Reviewed: The social history was reviewed and updated today  Current Meds   1  ALPRAZolam 0 5 MG Oral Tablet; TAKE 1 TABLET TWICE DAILY AS NEEDED; Therapy: 64RWK8748 to (Evaluate:20Ybh3996); Last Rx:2016 Ordered   2  Clotrimazole-Betamethasone 1-0 05 % External Cream; APPLY Y RUB IN A THIN FILM   HASTA AFFECTED AREAS TWICE A DAY(MORNING AND EVENING); Therapy: 41YEY7732 to (Marilyn Bee)  Requested for: 48Mov7159; Last   Rx:13Roo0995 Ordered   3   Cortisporin-TC 3 3-3-10-0 5 MG/ML SUSP; INSTILL 4 DROPS IN LEFT EAR 3-4 TIMES   DAILY; Therapy: 99LND1979 to 22 102673)  Requested for: 82RLH7914; Last   Rx:00Ygi0297 Ordered   4  Cyclobenzaprine HCl - 5 MG Oral Tablet; TAKE 1 TABLET AT BEDTIME AS NEEDED; Therapy: 07VCW4996 to (Evaluate:81Mbo9804)  Requested for: 81NHJ5784; Last   Rx:07Jun2016 Ordered   5  Debrox 6 5 % Otic Solution; 5-10 drops in each ear twice a day; Therapy: 67PDR7688 to (Evaluate:24Jun2016)  Requested for: 32WLG3619; Last   Rx:14Jun2016 Ordered   6  Fluticasone Propionate 50 MCG/ACT Nasal Suspension; USE TWO SPRAYS EACH   NOSTRIL ONCE DAILY; Therapy: 46IQF7028 to (Last Rx:13Jun2016)  Requested for: 13Jun2016 Ordered   7  Levothyroxine Sodium 50 MCG Oral Tablet; CARIDAD 1 TABLETA POR VIA ORAL   DIARIAMENTETAKE ONE TABLET BY MOUTH DAILY; Therapy: 12JZM0308 to (Evaluate:80Sxt7327)  Requested for: 32PWF5485; Last   Rx:13Jun2016 Ordered   8  Naproxen 500 MG Oral Tablet; CARIDAD 1 TABLETA POR VIA ORAL DOS VECES AL   DIATAKE ONE TABLET BY MOUTH TWICE DAILY; Therapy: 50TUI2603 to (Evaluate:35Iva1726)  Requested for: 55DOI9066; Last   Rx:07Jun2016 Ordered  Medication List Reviewed: The medication list was reviewed and updated today  Allergies    1  No Known Drug Allergies    Vitals  Vital Signs    Recorded: 02GNF6797 21:16EX   Systolic 788, LUE, Sitting   Diastolic 76, LUE, Sitting   Heart Rate 82   Height 4 ft 11 in   Weight 122 lb    BMI Calculated 24 64   BSA Calculated 1 49     Physical Exam    Posterior tibialis: right 2+ and left 2+  Dorsalis pedis: right 2+ and left 2+  Distal Pulse Exam: Normal Capillary Refill  Extremities: No upper or lower extremity edema  LE Varicose Veins: right leg reticular veins, left leg reticular veins, right leg spider veins and left leg spider veins  The heart rate was normal  The rhythm was regular  Heart sounds: normal S1 and normal S2    Murmurs: No murmurs were heard     Pulmonary Respiratory effort: No increased work of breathing or signs of respiratory distress  Abdomen   Abdomen: Abdomen soft, non-tender, no masses, non distended, no rebound tenderness  Psychiatric   Orientation to person, place and time: Normal    Eyes   Conjunctiva and lids: No swelling, erythema, or discharge  Ears, Nose, Mouth, and Throat   Hearing: Normal    Neck   Neck: No jugular venous distention, normal ROM and extension  No cervical adenopathy, trachea midline, neck supple  Neurologic Sensory exam normal   Motor skills intact  Musculoskeletal   Gait and station: Normal    Muscle strength/tone: Normal    Skin   Skin and subcutaneous tissue: Normal without rashes or lesions  Venous Disease: No lipodermatosclerosis, stasis dermatitis, hyperpigmentation, or atrophie sabina noted on exam       Future Appointments    Date/Time Provider Specialty Site   08/09/2016 10:30 AM JOO Gabriel  Internal Medicine [facility]   11/30/2016 10:45 AM Randy Garcia DO Vascular Surgery [facility]  [place]     Signatures   Electronically signed by : Travis Wilson; Aug  8 2016  1:46PM EST                       (Author)    Electronically signed by :  Kimberley Tellez MD; Aug  8 2016  3:28PM EST                       (Author)

## 2018-01-22 VITALS
HEIGHT: 59 IN | WEIGHT: 125 LBS | SYSTOLIC BLOOD PRESSURE: 112 MMHG | BODY MASS INDEX: 25.2 KG/M2 | DIASTOLIC BLOOD PRESSURE: 75 MMHG

## 2018-01-24 VITALS
DIASTOLIC BLOOD PRESSURE: 80 MMHG | BODY MASS INDEX: 25.2 KG/M2 | SYSTOLIC BLOOD PRESSURE: 112 MMHG | HEIGHT: 59 IN | WEIGHT: 125 LBS

## 2018-03-13 DIAGNOSIS — Z00.00 HEALTHCARE MAINTENANCE: Primary | ICD-10-CM

## 2018-03-13 RX ORDER — MULTIVITAMIN WITH FOLIC ACID 400 MCG
TABLET ORAL
Qty: 30 TABLET | Refills: 3 | Status: SHIPPED | OUTPATIENT
Start: 2018-03-13 | End: 2018-07-01 | Stop reason: SDUPTHER

## 2018-03-19 DIAGNOSIS — M46.1 SACROILIITIS, NOT ELSEWHERE CLASSIFIED (HCC): ICD-10-CM

## 2018-03-27 ENCOUNTER — OFFICE VISIT (OUTPATIENT)
Dept: INTERNAL MEDICINE CLINIC | Facility: CLINIC | Age: 63
End: 2018-03-27
Payer: COMMERCIAL

## 2018-03-27 VITALS
RESPIRATION RATE: 14 BRPM | SYSTOLIC BLOOD PRESSURE: 104 MMHG | HEART RATE: 80 BPM | DIASTOLIC BLOOD PRESSURE: 64 MMHG | TEMPERATURE: 97.8 F | BODY MASS INDEX: 25.04 KG/M2 | WEIGHT: 124 LBS

## 2018-03-27 DIAGNOSIS — J30.9 ALLERGIC RHINITIS, UNSPECIFIED CHRONICITY, UNSPECIFIED SEASONALITY, UNSPECIFIED TRIGGER: ICD-10-CM

## 2018-03-27 DIAGNOSIS — E03.4 HYPOTHYROIDISM DUE TO ACQUIRED ATROPHY OF THYROID: Primary | ICD-10-CM

## 2018-03-27 DIAGNOSIS — R23.8 VESICLES: ICD-10-CM

## 2018-03-27 DIAGNOSIS — M54.50 LOW BACK PAIN WITHOUT SCIATICA, UNSPECIFIED BACK PAIN LATERALITY, UNSPECIFIED CHRONICITY: ICD-10-CM

## 2018-03-27 DIAGNOSIS — F41.8 DEPRESSION WITH ANXIETY: ICD-10-CM

## 2018-03-27 DIAGNOSIS — I10 ACCELERATED HYPERTENSION: ICD-10-CM

## 2018-03-27 PROCEDURE — 3078F DIAST BP <80 MM HG: CPT | Performed by: INTERNAL MEDICINE

## 2018-03-27 PROCEDURE — 99214 OFFICE O/P EST MOD 30 MIN: CPT | Performed by: INTERNAL MEDICINE

## 2018-03-27 PROCEDURE — 3074F SYST BP LT 130 MM HG: CPT | Performed by: INTERNAL MEDICINE

## 2018-03-27 RX ORDER — NAPROXEN 500 MG/1
1 TABLET ORAL EVERY 12 HOURS
COMMUNITY
Start: 2014-04-01 | End: 2018-03-27

## 2018-03-27 RX ORDER — LEVOTHYROXINE SODIUM 0.05 MG/1
1 TABLET ORAL DAILY
COMMUNITY
Start: 2014-03-19 | End: 2018-09-26 | Stop reason: SDUPTHER

## 2018-03-27 RX ORDER — FLUTICASONE PROPIONATE 50 MCG
SPRAY, SUSPENSION (ML) NASAL
COMMUNITY
Start: 2017-12-20 | End: 2018-03-27 | Stop reason: SDUPTHER

## 2018-03-27 RX ORDER — IBUPROFEN 600 MG/1
600 TABLET ORAL EVERY 6 HOURS PRN
Qty: 30 TABLET | Refills: 2 | Status: SHIPPED | OUTPATIENT
Start: 2018-03-27 | End: 2018-09-26 | Stop reason: SDUPTHER

## 2018-03-27 RX ORDER — CONJUGATED ESTROGENS 0.62 MG/G
CREAM VAGINAL
COMMUNITY
Start: 2017-12-20 | End: 2021-03-10 | Stop reason: ALTCHOICE

## 2018-03-27 RX ORDER — FLUTICASONE PROPIONATE 50 MCG
2 SPRAY, SUSPENSION (ML) NASAL DAILY
Qty: 16 BOTTLE | Refills: 0 | Status: SHIPPED | OUTPATIENT
Start: 2018-03-27 | End: 2020-04-21 | Stop reason: SDUPTHER

## 2018-03-27 RX ORDER — ALPRAZOLAM 0.5 MG/1
TABLET ORAL
Qty: 30 TABLET | Refills: 0 | Status: SHIPPED | OUTPATIENT
Start: 2018-03-27 | End: 2018-09-26 | Stop reason: SDUPTHER

## 2018-03-27 RX ORDER — LEVOTHYROXINE SODIUM 0.05 MG/1
50 TABLET ORAL DAILY
Qty: 90 TABLET | Refills: 1 | Status: SHIPPED | OUTPATIENT
Start: 2018-03-27 | End: 2018-10-07 | Stop reason: SDUPTHER

## 2018-03-27 NOTE — PATIENT INSTRUCTIONS
Hypothyroidism   AMBULATORY CARE:   Hypothyroidism  is a condition that develops when the thyroid gland does not make enough thyroid hormone  Thyroid hormones help control body temperature, heart rate, growth, and weight  Common signs and symptoms include the following: The signs and symptoms may develop slowly, sometimes over several years  · Exhaustion    · Sensitivity to cold    · Headaches or decreased concentration    · Muscle aches or weakness    · Constipation     · Dry, flaky skin or brittle nails    · Thinning hair    · Heavy or irregular monthly periods    · Depression or irritability  Call 911 for any of the following:   · You have sudden chest pain or shortness of breath  · You have a seizure  · You feel like you are going to faint  Seek care immediately if:   · You have diarrhea, tremors, or trouble sleeping  · Your legs, ankles, or feet are swollen  Contact your healthcare provider if:   · You have a fever  · You have chills, a cough, or feel weak and achy  · You have pain and swelling in your muscles and joints  · Your skin is itchy, swollen, or you have a rash  · Your signs and symptoms return or get worse, even after treatment  · You have questions or concerns about your condition or care  Treatment:  Thyroid hormone replacement medicine may bring your thyroid hormone level back to normal  Ask your healthcare provider for more information on other medicines you may need  Follow up with your healthcare provider as directed: You may need to return for more blood tests to check your thyroid hormone level  This will show if you are getting the right amount of thyroid medicine  Write down your questions so you remember to ask them during your visits  © 2017 [employer]  Information is for End User's use only and may not be sold, redistributed or otherwise used for commercial purposes   All illustrations and images included in CareNotes® are the copyrighted property of [employer]  or Erick Kim  The above information is an  only  It is not intended as medical advice for individual conditions or treatments  Talk to your doctor, nurse or pharmacist before following any medical regimen to see if it is safe and effective for you  At the time we changed her medication from Naprosyn to ibuprofen   Continue taking you levothyroxine   Continue taking your Xanax sparingly  For allergic rhinitis continue taking her Flonase nasal spray as needed  Will see go see you back in 6 months and will check the lipid profile with thyroid function when you come back

## 2018-03-27 NOTE — ASSESSMENT & PLAN NOTE
Small vesicles on the left side of the face anterior to the ear    That has been there for 5 years not resolving

## 2018-03-27 NOTE — PROGRESS NOTES
Assessment/Plan:    Vesicles   Small vesicles on the left side of the face anterior to the ear  That has been there for 5 years not resolving    Hypothyroidism due to acquired atrophy of thyroid    Continue levothyroxine the TSH and T4 was normal last year will check a before you come back    Accelerated hypertension   Blood pressure is blood pressure is excellent control  With behavior modification  Depression with anxiety    Take Xanax only of p r n  Basis which I gave her a prescription today  Problem List Items Addressed This Visit     Depression with anxiety       Take Xanax only of p r n  Basis which I gave her a prescription today  Relevant Medications    levothyroxine 50 mcg tablet    ALPRAZolam (XANAX) 0 5 mg tablet    Other Relevant Orders    CBC and differential    Comprehensive metabolic panel    Lipid Panel with Direct LDL reflex    TSH, 3rd generation with T4 reflex    Vitamin D 25 hydroxy    Hypothyroidism due to acquired atrophy of thyroid - Primary       Continue levothyroxine the TSH and T4 was normal last year will check a before you come back         Relevant Medications    levothyroxine 50 mcg tablet    levothyroxine 50 mcg tablet    Other Relevant Orders    CBC and differential    Comprehensive metabolic panel    Lipid Panel with Direct LDL reflex    TSH, 3rd generation with T4 reflex    Vitamin D 25 hydroxy    RESOLVED: Accelerated hypertension      Blood pressure is blood pressure is excellent control  With behavior modification  Relevant Medications    levothyroxine 50 mcg tablet    Other Relevant Orders    CBC and differential    Comprehensive metabolic panel    Lipid Panel with Direct LDL reflex    TSH, 3rd generation with T4 reflex    Vitamin D 25 hydroxy    Vesicles      Small vesicles on the left side of the face anterior to the ear    That has been there for 5 years not resolving         Relevant Orders    Ambulatory referral to Dermatology      Other Visit Diagnoses     Low back pain without sciatica, unspecified back pain laterality, unspecified chronicity        Relevant Medications    ibuprofen (MOTRIN) 600 mg tablet    levothyroxine 50 mcg tablet    Other Relevant Orders    CBC and differential    Comprehensive metabolic panel    Lipid Panel with Direct LDL reflex    TSH, 3rd generation with T4 reflex    Vitamin D 25 hydroxy    Allergic rhinitis, unspecified chronicity, unspecified seasonality, unspecified trigger        Relevant Medications    fluticasone (FLONASE) 50 mcg/act nasal spray            Subjective:      Patient ID: Stewart Cui is a 59 y o  female  Chief Complaint   Patient presents with    Follow-up    Back Pain     for a long time          Current Outpatient Prescriptions:     ALPRAZolam (XANAX) 0 5 mg tablet, Take a half or 1 tablet twice a day as needed for anxiety  , Disp: 30 tablet, Rfl: 0    fluticasone (FLONASE) 50 mcg/act nasal spray, 2 sprays into each nostril daily for 30 days, Disp: 16 Bottle, Rfl: 0    levothyroxine 50 mcg tablet, Take 1 tablet (50 mcg total) by mouth daily, Disp: 90 tablet, Rfl: 1    Multiple Vitamin (TAB-A-FINA) TABS, take 1 tablet by mouth once daily, Disp: 30 tablet, Rfl: 3    PREMARIN vaginal cream, , Disp: , Rfl:     ibuprofen (MOTRIN) 600 mg tablet, Take 1 tablet (600 mg total) by mouth every 6 (six) hours as needed for mild pain for up to 30 days, Disp: 30 tablet, Rfl: 2    levothyroxine 50 mcg tablet, Take 1 tablet by mouth daily, Disp: , Rfl:       Problem 1  Hypothyroidism well control on levothyroxine 50 mcg per day will check a TSH and T4  The ones from last year were normal   She has no symptoms of hypothyroidism    Anxiety takes Xanax only on a p r n  Basis I did give her a prescription she looks fine        Chronic low back pain at told her became worse she can always go back to [facility] for injection which did work the previous years at the present time she is neurologically intact she is ambulating without any assistive devices we gave a ibuprofen 600 mg that she can take every 6 to every every a 6-8 hours as needed for back pain    She has a chronic vesical eruption very minimally in the left side of the face anterior to the ears which will have dermatologist evaluate I did not give her anything then has does not appear to be herpes simplex her zoster and has been there for 5 years  Allergic rhinitis takes Flonase nasal spray as needed  The following portions of the patient's history were reviewed and updated as appropriate: allergies, current medications, past family history, past medical history, past social history, past surgical history and problem list     Review of Systems   Constitutional: Negative  Negative for activity change, appetite change, fatigue, fever and unexpected weight change  HENT: Negative for congestion, ear pain, hearing loss, mouth sores, postnasal drip, rhinorrhea, sore throat, trouble swallowing and voice change  Eyes: Negative for pain, redness and visual disturbance  Respiratory: Negative for cough, chest tightness, shortness of breath and wheezing  Cardiovascular: Negative for chest pain, palpitations and leg swelling  Gastrointestinal: Negative for abdominal distention, abdominal pain, blood in stool, constipation, diarrhea and nausea  Endocrine: Negative for cold intolerance, heat intolerance, polydipsia, polyphagia and polyuria  Genitourinary: Negative for difficulty urinating, dysuria, flank pain, frequency, hematuria and urgency  Musculoskeletal: Positive for back pain  Negative for arthralgias, gait problem, joint swelling and myalgias  Skin: Positive for rash  Negative for color change and pallor  Neurological: Negative for dizziness, tremors, seizures, syncope, weakness, numbness and headaches  Hematological: Negative for adenopathy  Does not bruise/bleed easily  Psychiatric/Behavioral: Negative  Negative for sleep disturbance  The patient is not nervous/anxious  She is a little anxious but appears okay here         Objective:    /64 (BP Location: Left arm, Patient Position: Sitting, Cuff Size: Standard)   Pulse 80   Temp 97 8 °F (36 6 °C)   Resp 14   Wt 56 2 kg (124 lb)   BMI 25 04 kg/m²      Physical Exam   Constitutional: She is oriented to person, place, and time  She appears well-developed and well-nourished  HENT:   Head: Normocephalic  Right Ear: External ear normal    Left Ear: External ear normal    Nose: Nose normal    Mouth/Throat: Oropharynx is clear and moist  No oropharyngeal exudate  Eyes: Conjunctivae and EOM are normal  Pupils are equal, round, and reactive to light  Neck: Normal range of motion  Neck supple  No thyromegaly present  Cardiovascular: Normal rate, regular rhythm, normal heart sounds and intact distal pulses  Exam reveals no gallop and no friction rub  No murmur heard  Pulmonary/Chest: Effort normal and breath sounds normal  No respiratory distress  She has no wheezes  She has no rales  Abdominal: Soft  Bowel sounds are normal  She exhibits no distension and no mass  There is no tenderness  There is no rebound and no guarding  Musculoskeletal: Normal range of motion  Lymphadenopathy:     She has no cervical adenopathy  Neurological: She is alert and oriented to person, place, and time  Skin: Skin is warm and dry  For 5 vesicle like lesions nontender without erythema that appears on the left side of the face anterior to the ear for about 5 years  Does not look to me like is herpes simplex   Nontender on palpation  Psychiatric: She has a normal mood and affect  Her behavior is normal  Judgment normal    Nursing note and vitals reviewed

## 2018-07-01 DIAGNOSIS — Z00.00 HEALTHCARE MAINTENANCE: ICD-10-CM

## 2018-07-01 RX ORDER — MULTIVITAMIN WITH FOLIC ACID 400 MCG
TABLET ORAL
Qty: 30 TABLET | Refills: 3 | Status: SHIPPED | OUTPATIENT
Start: 2018-07-01 | End: 2018-09-26 | Stop reason: SDUPTHER

## 2018-09-26 ENCOUNTER — APPOINTMENT (OUTPATIENT)
Dept: LAB | Facility: HOSPITAL | Age: 63
End: 2018-09-26
Attending: INTERNAL MEDICINE
Payer: COMMERCIAL

## 2018-09-26 ENCOUNTER — OFFICE VISIT (OUTPATIENT)
Dept: INTERNAL MEDICINE CLINIC | Facility: CLINIC | Age: 63
End: 2018-09-26
Payer: COMMERCIAL

## 2018-09-26 VITALS
DIASTOLIC BLOOD PRESSURE: 68 MMHG | HEART RATE: 81 BPM | TEMPERATURE: 98 F | RESPIRATION RATE: 14 BRPM | BODY MASS INDEX: 25.85 KG/M2 | WEIGHT: 128 LBS | SYSTOLIC BLOOD PRESSURE: 115 MMHG

## 2018-09-26 DIAGNOSIS — E03.4 HYPOTHYROIDISM DUE TO ACQUIRED ATROPHY OF THYROID: Primary | ICD-10-CM

## 2018-09-26 DIAGNOSIS — F41.8 DEPRESSION WITH ANXIETY: ICD-10-CM

## 2018-09-26 DIAGNOSIS — E03.4 HYPOTHYROIDISM DUE TO ACQUIRED ATROPHY OF THYROID: ICD-10-CM

## 2018-09-26 DIAGNOSIS — Z23 NEED FOR INFLUENZA VACCINATION: ICD-10-CM

## 2018-09-26 DIAGNOSIS — M54.50 LOW BACK PAIN WITHOUT SCIATICA, UNSPECIFIED BACK PAIN LATERALITY, UNSPECIFIED CHRONICITY: ICD-10-CM

## 2018-09-26 DIAGNOSIS — I45.4: ICD-10-CM

## 2018-09-26 DIAGNOSIS — H61.20 WAX IN EAR: ICD-10-CM

## 2018-09-26 DIAGNOSIS — Z00.00 HEALTHCARE MAINTENANCE: ICD-10-CM

## 2018-09-26 DIAGNOSIS — Z12.39 SCREENING FOR BREAST CANCER: ICD-10-CM

## 2018-09-26 LAB
25(OH)D3 SERPL-MCNC: 17.8 NG/ML (ref 30–100)
ALBUMIN SERPL BCP-MCNC: 3.6 G/DL (ref 3.5–5)
ALP SERPL-CCNC: 108 U/L (ref 46–116)
ALT SERPL W P-5'-P-CCNC: 28 U/L (ref 12–78)
ANION GAP SERPL CALCULATED.3IONS-SCNC: 6 MMOL/L (ref 4–13)
AST SERPL W P-5'-P-CCNC: 22 U/L (ref 5–45)
BACTERIA UR QL AUTO: ABNORMAL /HPF
BASOPHILS # BLD AUTO: 0.03 THOUSANDS/ΜL (ref 0–0.1)
BASOPHILS NFR BLD AUTO: 0 % (ref 0–1)
BILIRUB SERPL-MCNC: 0.43 MG/DL (ref 0.2–1)
BILIRUB UR QL STRIP: NEGATIVE
BUN SERPL-MCNC: 22 MG/DL (ref 5–25)
CALCIUM SERPL-MCNC: 9.1 MG/DL (ref 8.3–10.1)
CHLORIDE SERPL-SCNC: 104 MMOL/L (ref 100–108)
CHOLEST SERPL-MCNC: 230 MG/DL (ref 50–200)
CLARITY UR: CLEAR
CO2 SERPL-SCNC: 31 MMOL/L (ref 21–32)
COLOR UR: YELLOW
CREAT SERPL-MCNC: 1 MG/DL (ref 0.6–1.3)
EOSINOPHIL # BLD AUTO: 0.11 THOUSAND/ΜL (ref 0–0.61)
EOSINOPHIL NFR BLD AUTO: 2 % (ref 0–6)
ERYTHROCYTE [DISTWIDTH] IN BLOOD BY AUTOMATED COUNT: 14 % (ref 11.6–15.1)
GFR SERPL CREATININE-BSD FRML MDRD: 61 ML/MIN/1.73SQ M
GGT SERPL-CCNC: 24 U/L (ref 5–85)
GLUCOSE SERPL-MCNC: 96 MG/DL (ref 65–140)
GLUCOSE UR STRIP-MCNC: NEGATIVE MG/DL
HCT VFR BLD AUTO: 37.2 % (ref 34.8–46.1)
HDLC SERPL-MCNC: 89 MG/DL (ref 40–60)
HGB BLD-MCNC: 11.7 G/DL (ref 11.5–15.4)
HGB UR QL STRIP.AUTO: ABNORMAL
IMM GRANULOCYTES # BLD AUTO: 0.02 THOUSAND/UL (ref 0–0.2)
IMM GRANULOCYTES NFR BLD AUTO: 0 % (ref 0–2)
KETONES UR STRIP-MCNC: NEGATIVE MG/DL
LDLC SERPL CALC-MCNC: 134 MG/DL (ref 0–100)
LEUKOCYTE ESTERASE UR QL STRIP: ABNORMAL
LYMPHOCYTES # BLD AUTO: 2.52 THOUSANDS/ΜL (ref 0.6–4.47)
LYMPHOCYTES NFR BLD AUTO: 35 % (ref 14–44)
MAGNESIUM SERPL-MCNC: 1.9 MG/DL (ref 1.6–2.6)
MCH RBC QN AUTO: 26.6 PG (ref 26.8–34.3)
MCHC RBC AUTO-ENTMCNC: 31.5 G/DL (ref 31.4–37.4)
MCV RBC AUTO: 85 FL (ref 82–98)
MONOCYTES # BLD AUTO: 0.7 THOUSAND/ΜL (ref 0.17–1.22)
MONOCYTES NFR BLD AUTO: 10 % (ref 4–12)
NEUTROPHILS # BLD AUTO: 3.85 THOUSANDS/ΜL (ref 1.85–7.62)
NEUTS SEG NFR BLD AUTO: 53 % (ref 43–75)
NITRITE UR QL STRIP: NEGATIVE
NON-SQ EPI CELLS URNS QL MICRO: ABNORMAL /HPF
NRBC BLD AUTO-RTO: 0 /100 WBCS
PH UR STRIP.AUTO: 6 [PH] (ref 4.5–8)
PLATELET # BLD AUTO: 294 THOUSANDS/UL (ref 149–390)
PMV BLD AUTO: 11.2 FL (ref 8.9–12.7)
POTASSIUM SERPL-SCNC: 4.2 MMOL/L (ref 3.5–5.3)
PROT SERPL-MCNC: 7.5 G/DL (ref 6.4–8.2)
PROT UR STRIP-MCNC: NEGATIVE MG/DL
RBC # BLD AUTO: 4.4 MILLION/UL (ref 3.81–5.12)
RBC #/AREA URNS AUTO: ABNORMAL /HPF
SODIUM SERPL-SCNC: 141 MMOL/L (ref 136–145)
SP GR UR STRIP.AUTO: 1.01 (ref 1–1.03)
T4 FREE SERPL-MCNC: 1.07 NG/DL (ref 0.76–1.46)
TRIGL SERPL-MCNC: 33 MG/DL
TSH SERPL DL<=0.05 MIU/L-ACNC: 4.84 UIU/ML (ref 0.36–3.74)
UROBILINOGEN UR QL STRIP.AUTO: 0.2 E.U./DL
WBC # BLD AUTO: 7.23 THOUSAND/UL (ref 4.31–10.16)
WBC #/AREA URNS AUTO: ABNORMAL /HPF

## 2018-09-26 PROCEDURE — 90682 RIV4 VACC RECOMBINANT DNA IM: CPT | Performed by: INTERNAL MEDICINE

## 2018-09-26 PROCEDURE — 90471 IMMUNIZATION ADMIN: CPT | Performed by: INTERNAL MEDICINE

## 2018-09-26 PROCEDURE — 84443 ASSAY THYROID STIM HORMONE: CPT

## 2018-09-26 PROCEDURE — 85025 COMPLETE CBC W/AUTO DIFF WBC: CPT

## 2018-09-26 PROCEDURE — 36415 COLL VENOUS BLD VENIPUNCTURE: CPT

## 2018-09-26 PROCEDURE — 82977 ASSAY OF GGT: CPT

## 2018-09-26 PROCEDURE — 1036F TOBACCO NON-USER: CPT | Performed by: INTERNAL MEDICINE

## 2018-09-26 PROCEDURE — 80061 LIPID PANEL: CPT

## 2018-09-26 PROCEDURE — 82306 VITAMIN D 25 HYDROXY: CPT

## 2018-09-26 PROCEDURE — 3725F SCREEN DEPRESSION PERFORMED: CPT | Performed by: INTERNAL MEDICINE

## 2018-09-26 PROCEDURE — 80053 COMPREHEN METABOLIC PANEL: CPT

## 2018-09-26 PROCEDURE — 81001 URINALYSIS AUTO W/SCOPE: CPT | Performed by: INTERNAL MEDICINE

## 2018-09-26 PROCEDURE — 84439 ASSAY OF FREE THYROXINE: CPT

## 2018-09-26 PROCEDURE — 83735 ASSAY OF MAGNESIUM: CPT

## 2018-09-26 PROCEDURE — 99214 OFFICE O/P EST MOD 30 MIN: CPT | Performed by: INTERNAL MEDICINE

## 2018-09-26 RX ORDER — MULTIVITAMIN WITH FOLIC ACID 400 MCG
1 TABLET ORAL DAILY
Qty: 30 TABLET | Refills: 0 | Status: SHIPPED | OUTPATIENT
Start: 2018-09-26 | End: 2018-11-26 | Stop reason: SDUPTHER

## 2018-09-26 RX ORDER — IBUPROFEN 600 MG/1
600 TABLET ORAL EVERY 6 HOURS PRN
Qty: 30 TABLET | Refills: 0 | Status: SHIPPED | OUTPATIENT
Start: 2018-09-26 | End: 2019-05-08 | Stop reason: SDUPTHER

## 2018-09-26 RX ORDER — ALPRAZOLAM 0.5 MG/1
TABLET ORAL
Qty: 30 TABLET | Refills: 1 | Status: SHIPPED | OUTPATIENT
Start: 2018-09-26 | End: 2019-05-08 | Stop reason: SDUPTHER

## 2018-09-26 RX ORDER — LEVOTHYROXINE SODIUM 0.05 MG/1
50 TABLET ORAL DAILY
Qty: 90 TABLET | Refills: 1 | Status: SHIPPED | OUTPATIENT
Start: 2018-09-26 | End: 2019-04-08 | Stop reason: SDUPTHER

## 2018-09-26 NOTE — PROGRESS NOTES
Assessment/Plan:    Hypothyroidism due to acquired atrophy of thyroid   Continue levothyroxine will check a TSH and T4 when she comes back    Chronic bundle branch block   Asymptomatic no episodes of chest pain or syncope    Depression with anxiety   Takes Xanax only on a p r n  Basis  Diagnoses and all orders for this visit:    Hypothyroidism due to acquired atrophy of thyroid  -     CBC and differential; Future  -     Comprehensive metabolic panel; Future  -     Lipid Panel with Direct LDL reflex; Future  -     TSH, 3rd generation with Free T4 reflex; Future  -     Urinalysis with reflex to microscopic  -     Vitamin D 25 hydroxy; Future  -     Gamma GT; Future  -     Magnesium; Future  -     Mammo screening bilateral w cad; Future  -     levothyroxine 50 mcg tablet; Take 1 tablet (50 mcg total) by mouth daily for 90 days    Depression with anxiety  -     CBC and differential; Future  -     Comprehensive metabolic panel; Future  -     Lipid Panel with Direct LDL reflex; Future  -     TSH, 3rd generation with Free T4 reflex; Future  -     Urinalysis with reflex to microscopic  -     Vitamin D 25 hydroxy; Future  -     Gamma GT; Future  -     Magnesium; Future  -     Mammo screening bilateral w cad; Future  -     ALPRAZolam (XANAX) 0 5 mg tablet; Take a half or 1 tablet twice a day as needed for anxiety  Chronic bundle branch block    Need for influenza vaccination  -     influenza vaccine, [id number], quadrivalent, recombinant, PF, 0 5 mL, for patients 18 yr+ (FLUBLOK)    Screening for breast cancer  -     Mammo screening bilateral w cad; Future    Low back pain without sciatica, unspecified back pain laterality, unspecified chronicity  -     ibuprofen (MOTRIN) 600 mg tablet;  Take 1 tablet (600 mg total) by mouth every 6 (six) hours as needed for mild pain for up to 30 days    Wax in ear  -     carbamide peroxide (DEBROX) 6 5 % otic solution; Administer 5 drops into both ears 2 (two) times a day    Healthcare maintenance  -     Multiple Vitamin (TAB-A-FINA) TABS; Take 1 tablet by mouth daily          Subjective:      Patient ID: Juan Antonio Powell is a 59 y o  female  Chief Complaint   Patient presents with    Follow-up         Current Outpatient Prescriptions:     ALPRAZolam (XANAX) 0 5 mg tablet, Take a half or 1 tablet twice a day as needed for anxiety  , Disp: 30 tablet, Rfl: 1    carbamide peroxide (DEBROX) 6 5 % otic solution, Administer 5 drops into both ears 2 (two) times a day, Disp: 15 mL, Rfl: 0    fluticasone (FLONASE) 50 mcg/act nasal spray, 2 sprays into each nostril daily for 30 days, Disp: 16 Bottle, Rfl: 0    ibuprofen (MOTRIN) 600 mg tablet, Take 1 tablet (600 mg total) by mouth every 6 (six) hours as needed for mild pain for up to 30 days, Disp: 30 tablet, Rfl: 0    levothyroxine 50 mcg tablet, Take 1 tablet (50 mcg total) by mouth daily, Disp: 90 tablet, Rfl: 1    levothyroxine 50 mcg tablet, Take 1 tablet (50 mcg total) by mouth daily for 90 days, Disp: 90 tablet, Rfl: 1    Multiple Vitamin (TAB-A-FINA) TABS, Take 1 tablet by mouth daily, Disp: 30 tablet, Rfl: 0    PREMARIN vaginal cream, , Disp: , Rfl:     Follow-up visit she is doing wonderful reviewing the chart she is overdue for labs I ordered the mammography she has a gynecology exam every year her blood pressure is control we need to check her thyroid function she is on levothyroxine replacement will see her back in 6 months        The following portions of the patient's history were reviewed and updated as appropriate: allergies, current medications, past family history, past medical history, past social history, past surgical history and problem list     Review of Systems   Constitutional: Negative  Negative for activity change, appetite change, fatigue, fever and unexpected weight change     HENT: Negative for congestion, ear pain, hearing loss, mouth sores, postnasal drip, rhinorrhea, sore throat, trouble swallowing and voice change  Eyes: Negative for pain, redness and visual disturbance  Respiratory: Negative for cough, chest tightness, shortness of breath and wheezing  Cardiovascular: Negative for chest pain, palpitations and leg swelling  Gastrointestinal: Negative for abdominal distention, abdominal pain, blood in stool, constipation, diarrhea and nausea  Endocrine: Negative for cold intolerance, heat intolerance, polydipsia, polyphagia and polyuria  Genitourinary: Negative for difficulty urinating, dysuria, flank pain, frequency, hematuria and urgency  Musculoskeletal: Negative for arthralgias, back pain, gait problem, joint swelling and myalgias  Skin: Negative for color change and pallor  Neurological: Negative for dizziness, tremors, seizures, syncope, weakness, numbness and headaches  Hematological: Negative for adenopathy  Does not bruise/bleed easily  Psychiatric/Behavioral: Negative  Negative for sleep disturbance  The patient is not nervous/anxious  Objective:    Results for orders placed or performed in visit on 10/02/17   Liquid-based pap, screening   Result Value Ref Range    Case Report       Gynecologic Cytology Report                       Case: [id number]                                  Authorizing Provider:  Mariam Lieberman MD           Collected:           10/02/2017                   First Screen:          EDUARDA Singer  Received:            10/06/2017 6820              Specimen:    LIQUID-BASED PAP, SCREENING, Vaginal                                                       Primary Interpretation Negative for intraepithelial lesion or malignancy     Specimen Adequacy Satisfactory for evaluation       Additional Information       [employer]'s FDA approved ,  and ThinPrep Imaging System are utilized with strict adherence to the 's instruction manual to prepare gynecologic and non-gynecologic cytology specimens for the production of ThinPrep slides as well as for gynecologic ThinPrep imaging  These processes have been validated by our laboratory and/or by the   The Pap test is not a diagnostic procedure and should not be used as the sole means to detect cervical cancer  It is only a screening procedure to aid in the detection of cervical cancer and its precursors  Both false-negative and false-positive results have been experienced  Your patient's test result should be interpreted in this context together with the history and clinical findings  /68 (BP Location: Left arm, Patient Position: Sitting, Cuff Size: Standard)   Pulse 81   Temp 98 °F (36 7 °C)   Resp 14   Wt 58 1 kg (128 lb)   BMI 25 85 kg/m²      Physical Exam   Constitutional: She is oriented to person, place, and time  She appears well-developed and well-nourished  HENT:   Head: Normocephalic  Right Ear: External ear normal    Left Ear: External ear normal    Nose: Nose normal    Mouth/Throat: Oropharynx is clear and moist  No oropharyngeal exudate  Eyes: Conjunctivae and EOM are normal  Pupils are equal, round, and reactive to light  Neck: Normal range of motion  Neck supple  No thyromegaly present  Cardiovascular: Normal rate, regular rhythm, normal heart sounds and intact distal pulses  Exam reveals no gallop and no friction rub  No murmur heard  S1-S2 no gallops  Extremities no edema   Pulmonary/Chest: Effort normal and breath sounds normal  No respiratory distress  She has no wheezes  She has no rales  Lungs are clear   Abdominal: Soft  Bowel sounds are normal  She exhibits no distension and no mass  There is no tenderness  There is no rebound and no guarding  Abdomen nontender no hepatosplenomegaly   Musculoskeletal: Normal range of motion  Lymphadenopathy:     She has no cervical adenopathy  Neurological: She is alert and oriented to person, place, and time  Skin: Skin is warm and dry     Psychiatric: She has a normal mood and affect  Her behavior is normal  Judgment normal    Nursing note and vitals reviewed

## 2018-09-26 NOTE — PATIENT INSTRUCTIONS
Blood pressure control  Get her lab work done gets line your or of do for your mammography after October   Will see her back in 6 months

## 2018-10-02 ENCOUNTER — TELEPHONE (OUTPATIENT)
Dept: INTERNAL MEDICINE CLINIC | Facility: CLINIC | Age: 63
End: 2018-10-02

## 2018-10-02 NOTE — TELEPHONE ENCOUNTER
----- Message from Amalia Rader MD sent at 9/26/2018  9:22 PM EDT -----  Please call the patient regarding her abnormal result  vit D low  TSH  Mild elev  T4  Pending   Start vit D [id number] monthly make sure takes  lthyroxine empty stomach if she for get take it 1 day  Made take  2 the next day    Check  Bmp & ths  Vit D level 3 months !!! Save OV

## 2018-10-03 DIAGNOSIS — E03.4 HYPOTHYROIDISM DUE TO ACQUIRED ATROPHY OF THYROID: Primary | ICD-10-CM

## 2018-10-03 DIAGNOSIS — E55.9 VITAMIN D DEFICIENCY: Primary | ICD-10-CM

## 2018-10-03 DIAGNOSIS — E55.9 VITAMIN D DEFICIENCY: ICD-10-CM

## 2018-10-03 RX ORDER — ERGOCALCIFEROL 1.25 MG/1
50000 CAPSULE ORAL
Qty: 6 CAPSULE | Refills: 0 | Status: SHIPPED | OUTPATIENT
Start: 2018-10-03 | End: 2019-05-08 | Stop reason: ALTCHOICE

## 2018-10-03 NOTE — TELEPHONE ENCOUNTER
I spoke with Lobito Dixon and she is aware of her results  Vit D sent to [facility]  Labs ordered and mailed to pt

## 2018-10-07 DIAGNOSIS — I10 ACCELERATED HYPERTENSION: ICD-10-CM

## 2018-10-07 DIAGNOSIS — F41.8 DEPRESSION WITH ANXIETY: ICD-10-CM

## 2018-10-07 DIAGNOSIS — M54.50 LOW BACK PAIN WITHOUT SCIATICA, UNSPECIFIED BACK PAIN LATERALITY, UNSPECIFIED CHRONICITY: ICD-10-CM

## 2018-10-07 DIAGNOSIS — E03.4 HYPOTHYROIDISM DUE TO ACQUIRED ATROPHY OF THYROID: ICD-10-CM

## 2018-10-07 RX ORDER — LEVOTHYROXINE SODIUM 0.05 MG/1
TABLET ORAL
Qty: 90 TABLET | Refills: 1 | Status: SHIPPED | OUTPATIENT
Start: 2018-10-07 | End: 2019-04-08 | Stop reason: SDUPTHER

## 2018-10-17 ENCOUNTER — HOSPITAL ENCOUNTER (OUTPATIENT)
Dept: MAMMOGRAPHY | Facility: HOSPITAL | Age: 63
Discharge: HOME | End: 2018-10-17
Attending: INTERNAL MEDICINE

## 2018-10-17 DIAGNOSIS — F41.8 DEPRESSION WITH ANXIETY: ICD-10-CM

## 2018-10-17 DIAGNOSIS — Z12.39 SCREENING FOR BREAST CANCER: ICD-10-CM

## 2018-10-17 DIAGNOSIS — E03.4 HYPOTHYROIDISM DUE TO ACQUIRED ATROPHY OF THYROID: ICD-10-CM

## 2018-11-02 ENCOUNTER — HOSPITAL ENCOUNTER (OUTPATIENT)
Dept: MAMMOGRAPHY | Facility: HOSPITAL | Age: 63
Discharge: HOME | End: 2018-11-02
Attending: INTERNAL MEDICINE
Payer: COMMERCIAL

## 2018-11-02 PROCEDURE — 77067 SCR MAMMO BI INCL CAD: CPT

## 2018-11-26 DIAGNOSIS — Z00.00 HEALTHCARE MAINTENANCE: ICD-10-CM

## 2018-11-26 RX ORDER — MULTIVITAMIN WITH FOLIC ACID 400 MCG
1 TABLET ORAL DAILY
Qty: 30 TABLET | Refills: 3 | Status: SHIPPED | OUTPATIENT
Start: 2018-11-26 | End: 2019-07-26 | Stop reason: HOSPADM

## 2019-04-08 DIAGNOSIS — M54.50 LOW BACK PAIN WITHOUT SCIATICA, UNSPECIFIED BACK PAIN LATERALITY, UNSPECIFIED CHRONICITY: ICD-10-CM

## 2019-04-08 DIAGNOSIS — E03.4 HYPOTHYROIDISM DUE TO ACQUIRED ATROPHY OF THYROID: ICD-10-CM

## 2019-04-08 DIAGNOSIS — F41.8 DEPRESSION WITH ANXIETY: ICD-10-CM

## 2019-04-08 DIAGNOSIS — I10 ACCELERATED HYPERTENSION: ICD-10-CM

## 2019-04-08 RX ORDER — LEVOTHYROXINE SODIUM 0.05 MG/1
50 TABLET ORAL DAILY
Qty: 30 TABLET | Refills: 1 | Status: SHIPPED | OUTPATIENT
Start: 2019-04-08 | End: 2019-06-21 | Stop reason: SDUPTHER

## 2019-04-16 DIAGNOSIS — E03.4 HYPOTHYROIDISM DUE TO ACQUIRED ATROPHY OF THYROID: ICD-10-CM

## 2019-04-16 RX ORDER — LEVOTHYROXINE SODIUM 0.05 MG/1
TABLET ORAL
Qty: 90 TABLET | Refills: 1 | Status: SHIPPED | OUTPATIENT
Start: 2019-04-16 | End: 2019-05-08 | Stop reason: SDUPTHER

## 2019-05-08 ENCOUNTER — OFFICE VISIT (OUTPATIENT)
Dept: INTERNAL MEDICINE CLINIC | Facility: CLINIC | Age: 64
End: 2019-05-08
Payer: COMMERCIAL

## 2019-05-08 VITALS
TEMPERATURE: 98.1 F | DIASTOLIC BLOOD PRESSURE: 68 MMHG | BODY MASS INDEX: 26.86 KG/M2 | SYSTOLIC BLOOD PRESSURE: 111 MMHG | WEIGHT: 133 LBS | RESPIRATION RATE: 14 BRPM | HEART RATE: 78 BPM

## 2019-05-08 DIAGNOSIS — Z12.11 COLON CANCER SCREENING: ICD-10-CM

## 2019-05-08 DIAGNOSIS — E03.4 HYPOTHYROIDISM DUE TO ACQUIRED ATROPHY OF THYROID: ICD-10-CM

## 2019-05-08 DIAGNOSIS — R21 RASH: ICD-10-CM

## 2019-05-08 DIAGNOSIS — F41.8 DEPRESSION WITH ANXIETY: Primary | ICD-10-CM

## 2019-05-08 DIAGNOSIS — M54.50 LOW BACK PAIN WITHOUT SCIATICA, UNSPECIFIED BACK PAIN LATERALITY, UNSPECIFIED CHRONICITY: ICD-10-CM

## 2019-05-08 DIAGNOSIS — Z12.39 SCREENING FOR BREAST CANCER: ICD-10-CM

## 2019-05-08 DIAGNOSIS — I45.4: ICD-10-CM

## 2019-05-08 PROCEDURE — 99214 OFFICE O/P EST MOD 30 MIN: CPT | Performed by: INTERNAL MEDICINE

## 2019-05-08 PROCEDURE — 1036F TOBACCO NON-USER: CPT | Performed by: INTERNAL MEDICINE

## 2019-05-08 RX ORDER — IBUPROFEN 600 MG/1
600 TABLET ORAL EVERY 6 HOURS PRN
Qty: 30 TABLET | Refills: 0 | Status: SHIPPED | OUTPATIENT
Start: 2019-05-08 | End: 2019-07-26 | Stop reason: HOSPADM

## 2019-05-08 RX ORDER — ALPRAZOLAM 0.5 MG/1
TABLET ORAL
Qty: 30 TABLET | Refills: 1 | Status: SHIPPED | OUTPATIENT
Start: 2019-05-08 | End: 2019-05-08 | Stop reason: SDUPTHER

## 2019-05-08 RX ORDER — ALPRAZOLAM 0.5 MG/1
TABLET ORAL
Qty: 30 TABLET | Refills: 1 | Status: SHIPPED | OUTPATIENT
Start: 2019-05-08 | End: 2019-12-11 | Stop reason: SDUPTHER

## 2019-06-14 ENCOUNTER — HOSPITAL ENCOUNTER (OUTPATIENT)
Dept: NON INVASIVE DIAGNOSTICS | Facility: HOSPITAL | Age: 64
Discharge: HOME | End: 2019-06-14
Attending: PLASTIC SURGERY
Payer: COMMERCIAL

## 2019-06-14 ENCOUNTER — TRANSCRIBE ORDERS (OUTPATIENT)
Dept: ADMINISTRATIVE | Facility: HOSPITAL | Age: 64
End: 2019-06-14

## 2019-06-14 ENCOUNTER — APPOINTMENT (OUTPATIENT)
Dept: LAB | Facility: HOSPITAL | Age: 64
End: 2019-06-14
Attending: PLASTIC SURGERY
Payer: COMMERCIAL

## 2019-06-14 ENCOUNTER — APPOINTMENT (OUTPATIENT)
Dept: LAB | Facility: HOSPITAL | Age: 64
End: 2019-06-14
Payer: COMMERCIAL

## 2019-06-14 DIAGNOSIS — Z01.812 PRE-OPERATIVE LABORATORY EXAMINATION: Primary | ICD-10-CM

## 2019-06-14 DIAGNOSIS — Z01.812 PRE-OPERATIVE LABORATORY EXAMINATION: ICD-10-CM

## 2019-06-14 DIAGNOSIS — E03.4 HYPOTHYROIDISM DUE TO ACQUIRED ATROPHY OF THYROID: ICD-10-CM

## 2019-06-14 DIAGNOSIS — F41.8 DEPRESSION WITH ANXIETY: ICD-10-CM

## 2019-06-14 LAB
ALBUMIN SERPL BCP-MCNC: 4.2 G/DL (ref 3–5.2)
ALP SERPL-CCNC: 95 U/L (ref 43–122)
ALT SERPL W P-5'-P-CCNC: 23 U/L (ref 9–52)
ANION GAP SERPL CALCULATED.3IONS-SCNC: 6 MMOL/L (ref 5–14)
APTT PPP: 27 SECONDS (ref 23–34)
AST SERPL W P-5'-P-CCNC: 30 U/L (ref 14–36)
BASOPHILS # BLD AUTO: 0 THOUSANDS/ΜL (ref 0–0.1)
BASOPHILS NFR BLD AUTO: 0 % (ref 0–1)
BILIRUB SERPL-MCNC: 0.6 MG/DL
BUN SERPL-MCNC: 17 MG/DL (ref 5–25)
CALCIUM SERPL-MCNC: 9.3 MG/DL (ref 8.4–10.2)
CHLORIDE SERPL-SCNC: 102 MMOL/L (ref 97–108)
CHOLEST SERPL-MCNC: 221 MG/DL
CO2 SERPL-SCNC: 32 MMOL/L (ref 22–30)
CREAT SERPL-MCNC: 0.8 MG/DL (ref 0.6–1.2)
EOSINOPHIL # BLD AUTO: 0.1 THOUSAND/ΜL (ref 0–0.4)
EOSINOPHIL NFR BLD AUTO: 1 % (ref 0–6)
ERYTHROCYTE [DISTWIDTH] IN BLOOD BY AUTOMATED COUNT: 14.4 %
GFR SERPL CREATININE-BSD FRML MDRD: 79 ML/MIN/1.73SQ M
GLUCOSE P FAST SERPL-MCNC: 86 MG/DL (ref 70–99)
HCT VFR BLD AUTO: 38.2 % (ref 36–46)
HDLC SERPL-MCNC: 72 MG/DL (ref 40–59)
HGB BLD-MCNC: 12.6 G/DL (ref 12–16)
INR PPP: 0.96 (ref 0.89–1.1)
LDLC SERPL CALC-MCNC: 140 MG/DL
LYMPHOCYTES # BLD AUTO: 1.7 THOUSANDS/ΜL (ref 0.5–4)
LYMPHOCYTES NFR BLD AUTO: 30 % (ref 25–45)
MCH RBC QN AUTO: 26.8 PG (ref 26–34)
MCHC RBC AUTO-ENTMCNC: 32.9 G/DL (ref 31–36)
MCV RBC AUTO: 82 FL (ref 80–100)
MONOCYTES # BLD AUTO: 0.6 THOUSAND/ΜL (ref 0.2–0.9)
MONOCYTES NFR BLD AUTO: 10 % (ref 1–10)
NEUTROPHILS # BLD AUTO: 3.5 THOUSANDS/ΜL (ref 1.8–7.8)
NEUTS SEG NFR BLD AUTO: 59 % (ref 45–65)
PLATELET # BLD AUTO: 263 THOUSANDS/UL (ref 150–450)
PMV BLD AUTO: 10.4 FL (ref 8.9–12.7)
POTASSIUM SERPL-SCNC: 4.1 MMOL/L (ref 3.6–5)
PROT SERPL-MCNC: 7.7 G/DL (ref 5.9–8.4)
PROTHROMBIN TIME: 10.2 SECONDS (ref 9.5–11.6)
RBC # BLD AUTO: 4.68 MILLION/UL (ref 4–5.2)
SODIUM SERPL-SCNC: 140 MMOL/L (ref 137–147)
T4 FREE SERPL-MCNC: 1.19 NG/DL (ref 0.76–1.46)
TRIGL SERPL-MCNC: 46 MG/DL
TSH SERPL DL<=0.05 MIU/L-ACNC: 6.43 UIU/ML (ref 0.47–4.68)
WBC # BLD AUTO: 5.9 THOUSAND/UL (ref 4.5–11)

## 2019-06-14 PROCEDURE — 84443 ASSAY THYROID STIM HORMONE: CPT

## 2019-06-14 PROCEDURE — 85730 THROMBOPLASTIN TIME PARTIAL: CPT

## 2019-06-14 PROCEDURE — 84439 ASSAY OF FREE THYROXINE: CPT

## 2019-06-14 PROCEDURE — 85610 PROTHROMBIN TIME: CPT

## 2019-06-14 PROCEDURE — 82306 VITAMIN D 25 HYDROXY: CPT

## 2019-06-14 PROCEDURE — 93005 ELECTROCARDIOGRAM TRACING: CPT

## 2019-06-14 PROCEDURE — 80061 LIPID PANEL: CPT

## 2019-06-14 PROCEDURE — 36415 COLL VENOUS BLD VENIPUNCTURE: CPT

## 2019-06-14 PROCEDURE — 80053 COMPREHEN METABOLIC PANEL: CPT

## 2019-06-14 PROCEDURE — 85025 COMPLETE CBC W/AUTO DIFF WBC: CPT

## 2019-06-15 LAB — 25(OH)D3 SERPL-MCNC: 19.7 NG/ML (ref 30–100)

## 2019-06-16 LAB
ATRIAL RATE: 66 BPM
P AXIS: 69 DEGREES
PR INTERVAL: 152 MS
QRS AXIS: -12 DEGREES
QRSD INTERVAL: 134 MS
QT INTERVAL: 438 MS
QTC INTERVAL: 459 MS
T WAVE AXIS: 68 DEGREES
VENTRICULAR RATE: 66 BPM

## 2019-06-16 PROCEDURE — 93010 ELECTROCARDIOGRAM REPORT: CPT | Performed by: INTERNAL MEDICINE

## 2019-06-21 ENCOUNTER — TELEPHONE (OUTPATIENT)
Dept: INTERNAL MEDICINE CLINIC | Facility: CLINIC | Age: 64
End: 2019-06-21

## 2019-06-21 DIAGNOSIS — M54.50 LOW BACK PAIN WITHOUT SCIATICA, UNSPECIFIED BACK PAIN LATERALITY, UNSPECIFIED CHRONICITY: ICD-10-CM

## 2019-06-21 DIAGNOSIS — E03.4 HYPOTHYROIDISM DUE TO ACQUIRED ATROPHY OF THYROID: ICD-10-CM

## 2019-06-21 DIAGNOSIS — E55.9 VITAMIN D DEFICIENCY: Primary | ICD-10-CM

## 2019-06-21 DIAGNOSIS — I10 ACCELERATED HYPERTENSION: ICD-10-CM

## 2019-06-21 DIAGNOSIS — F41.8 DEPRESSION WITH ANXIETY: ICD-10-CM

## 2019-06-21 RX ORDER — CHOLECALCIFEROL (VITAMIN D3) 125 MCG
2000 CAPSULE ORAL DAILY
Qty: 30 TABLET | Refills: 0
Start: 2019-06-21 | End: 2019-08-08

## 2019-06-21 RX ORDER — LEVOTHYROXINE SODIUM 0.05 MG/1
TABLET ORAL
Qty: 30 TABLET | Refills: 1
Start: 2019-06-21 | End: 2019-12-11 | Stop reason: SDUPTHER

## 2019-07-19 NOTE — PRE-PROCEDURE INSTRUCTIONS
Pre-Surgery Instructions:   Medication Instructions    ALPRAZolam (XANAX) 0 5 mg tablet Instructed patient per Anesthesia Guidelines   fluticasone (FLONASE) 50 mcg/act nasal spray Instructed patient per Anesthesia Guidelines  Pre-op Showering Instructions for Surgery Patients    Before your operation, you play an important role in decreasing your risk for infection by washing with special antiseptic soap  This is an effective way to reduce bacteria on the skin which may help to prevent infections at the surgical site  Please read the following directions in advance  1  In the week before your operation, purchase a 4 ounce bottle of antiseptic soap containing chlorhexidine gluconate (CHG)  4%  Some brand names include: Aplicare®, Endure, and Hibiclens®  The cost is usually less than $5 00   For your convenience, the [address] [place] [address] carries the soap   It may also be available at your doctors office or pre-admission testing center, and at most retail pharmacies   If you are allergic or sensitive to soaps containing CHG, please let your doctor know so another antiseptic can be suggested   CHG antiseptic soap is for external use only  2  The day before your operation, follow these instructions carefully to get ready   Please clean linens (sheets) on your bed; you should sleep on clean sheets after your evening shower   Get clean towels and washcloth ready - you need enough for 2 showers   Set aside clean underwear, pajamas, and clothing to wear after the showers     Reminders:   DO NOT use any other soap or body rinse on your skin during or after the antiseptic showers   DO NOT use lotion, powder, deodorant, or perfume/aftershave of any kind on your skin after your antiseptic shower   DO NOT shave any body parts in the 24 hours/day before your operation   DO NOT get the antiseptic soap in your eyes, ears, nose, mouth, or vaginal area    3   You will need to shower the night before AND the morning of your surgery  Shower 1:   The first evening before the operation, take the first shower   First, shampoo your hair with regular shampoo and rinse it completely before you use the antiseptic soap  After washing and rinsing your hair, rinse your body   Next, use a clean washcloth to apply the antiseptic soap and wash your body from the neck down to your toes using ½ bottle of the antiseptic soap   You will use the other ½ bottle for the second shower   Clean the area where your incision will be; lather this area well for about 2 minutes   If you are having head or neck surgery, wash areas with the antiseptic soap   Rinse yourself completely with running water   Use a clean towel to dry off   Wear clean underwear and clothing/pajamas  Shower 2   The morning of your operation, take the second shower following the same steps as Shower 1 using the second ½ of the bottle of antiseptic soap   Use clean cloths and towels to wash and dry yourself   Wear clean underwear and clothing

## 2019-07-23 NOTE — PRE-PROCEDURE INSTRUCTIONS
Pre-Surgery Instructions:   Medication Instructions    ALPRAZolam (XANAX) 0 5 mg tablet Instructed patient per Anesthesia Guidelines  Pre-op Showering Instructions for Surgery Patients    Before your operation, you play an important role in decreasing your risk for infection by washing with special antiseptic soap  This is an effective way to reduce bacteria on the skin which may help to prevent infections at the surgical site  Please read the following directions in advance  1  In the week before your operation, purchase a 4 ounce bottle of antiseptic soap containing chlorhexidine gluconate (CHG)  4%  Some brand names include: Aplicare®, Endure, and Hibiclens®  The cost is usually less than $5 00   For your convenience, the [employer] carries the soap   It may also be available at your doctors office or pre-admission testing center, and at most retail pharmacies   If you are allergic or sensitive to soaps containing CHG, please let your doctor know so another antiseptic can be suggested   CHG antiseptic soap is for external use only  2  The day before your operation, follow these instructions carefully to get ready   Please clean linens (sheets) on your bed; you should sleep on clean sheets after your evening shower   Get clean towels and washcloth ready - you need enough for 2 showers   Set aside clean underwear, pajamas, and clothing to wear after the showers     Reminders:   DO NOT use any other soap or body rinse on your skin during or after the antiseptic showers   DO NOT use lotion, powder, deodorant, or perfume/aftershave of any kind on your skin after your antiseptic shower   DO NOT shave any body parts in the 24 hours/day before your operation   DO NOT get the antiseptic soap in your eyes, ears, nose, mouth, or vaginal area    3   You will need to shower the night before AND the morning of your surgery  Shower 1:   The first evening before the operation, take the first shower   First, shampoo your hair with regular shampoo and rinse it completely before you use the antiseptic soap  After washing and rinsing your hair, rinse your body   Next, use a clean washcloth to apply the antiseptic soap and wash your body from the neck down to your toes using ½ bottle of the antiseptic soap   You will use the other ½ bottle for the second shower   Clean the area where your incision will be; lather this area well for about 2 minutes   If you are having head or neck surgery, wash areas with the antiseptic soap   Rinse yourself completely with running water   Use a clean towel to dry off   Wear clean underwear and clothing/pajamas  Shower 2   The morning of your operation, take the second shower following the same steps as Shower 1 using the second ½ of the bottle of antiseptic soap   Use clean cloths and towels to wash and dry yourself   Wear clean underwear and clothing

## 2019-07-24 ENCOUNTER — ANESTHESIA EVENT (OUTPATIENT)
Dept: PERIOP | Facility: HOSPITAL | Age: 64
End: 2019-07-24
Payer: SELF-PAY

## 2019-07-24 NOTE — H&P
H&P Exam - Claus Juana 62 y o  female MRN: [id number]    Unit/Bed#:  Encounter: [id number]    Assessment:  Lipodystrophy of the abdomen      Plan:  Abdominal plasty    History of Present Illness   This is a 81-year-old female with 2 scars on her abdomen the vertical and horizontal scar from previous surgery  The patient has redundant skin and fat that hang down over the scar and is a good candidate for an abdominoplasty  Review of Systems   All other systems reviewed and are negative  Historical Information   Past Medical History:   Diagnosis Date    Adhesive capsulitis of left shoulder     Last assessed - 2/16/15    Allergic rhinitis     Last assessed - 3/17/15    Anxiety     Back pain     Disease of thyroid gland     hypo    Hyperlipidemia     borderline , no meds    LBBB (left bundle branch block)     chronic    Migraine     No known health problems     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 for tuberculosis     Psychiatric disorder     Rash of face 2019    Vitamin D deficiency      Past Surgical History:   Procedure Laterality Date    BREAST LUMPECTOMY      COLONOSCOPY      HYSTERECTOMY      SHOULDER ARTHROSCOPY      Last assessed - 12/23/14    SHOULDER ARTHROSCOPY W/ ROTATOR CUFF REPAIR      Last assessed - 4/4/16     Social History   Social History     Substance and Sexual Activity   Alcohol Use No     Social History     Substance and Sexual Activity   Drug Use No     Social History     Tobacco Use   Smoking Status Never Smoker   Smokeless Tobacco Never Used     Family History: non-contributory    Meds/Allergies   all medications and allergies reviewed  No Known Allergies    Objective   First Vitals:        Current Vitals:        No intake or output data in the 24 hours ending 07/24/19 1919    Invasive Devices     None                 Physical Exam examination of the head ears eyes nose and throat is within normal limits heart sounds S1-S2 heard no murmurs or gallops    Lungs clear abdomen is soft and extremities are within normal limits  Abdomen is soft there is a vertical and horizontal scar of the lower abdomen his redundant skin and tissue that hangs over the scar      Lab Results:   Imaging:   EKG, Pathology, and Other Studies:     Code Status: Prior  Advance Directive and Living Will:      Power of :    POLST:      Counseling / Coordination of Care:   None

## 2019-07-24 NOTE — ANESTHESIA PREPROCEDURE EVALUATION
Review of Systems/Medical History  Patient summary reviewed  Chart reviewed  History of anesthetic complications PONV    Cardiovascular  Hyperlipidemia,   Comment: Chronic LBBB  asymtomatic,  Pulmonary  Negative pulmonary ROS        GI/Hepatic  Negative GI/hepatic ROS          Negative  ROS        Endo/Other  History of thyroid disease , hypothyroidism,      GYN    Hysterectomy,        Hematology  Negative hematology ROS      Musculoskeletal    Arthritis     Neurology    Headaches (Migraines),    Psychology   Anxiety,              Physical Exam    Airway    Mallampati score: II  TM Distance: >3 FB  Neck ROM: full     Dental       Cardiovascular  Cardiovascular exam normal    Pulmonary  Pulmonary exam normal     Other Findings       [facility] [address]  [facility], [address] [facility]  [phone]     Transthoracic Echocardiogram  2D, M-mode, Doppler, and Color Doppler     Study date:  22-Mar-2017     Patient: Matt Manuel  MR number: [id number]  Account number: [de-identified]  : 1955  Age: 62 years  Gender: Female  Status: Inpatient  Location: Bedside  Height: 60 in  Weight: 122 lb  BP: 94/ 55 mmHg     Indications: TIA      Diagnoses: G45 9 - Transient cerebral ischemic attack, unspecified     Sonographer:  Richard LEZAMA, [facility]  Primary Physician:  Ronnie Cohn MD  Referring Physician:  Mahin Barton MD  Group:  [facility]  Interpreting Physician:  Kalli Lopez MD     SUMMARY     LEFT VENTRICLE:  Systolic function was normal  Ejection fraction was estimated to be 60 %  There were no regional wall motion abnormalities  Wall thickness was at the upper limits of normal      HISTORY: PRIOR HISTORY: Patient has no history of cardiovascular disease      PROCEDURE: The procedure was performed at the bedside  This was a routine study  The transthoracic approach was used   The study included complete 2D imaging, M-mode, complete spectral Doppler, and color Doppler  Images were obtained from  the parasternal, apical, subcostal, and suprasternal notch acoustic windows  Image quality was adequate      LEFT VENTRICLE: Size was normal  Systolic function was normal  Ejection fraction was estimated to be 60 %  There were no regional wall motion abnormalities  Wall thickness was at the upper limits of normal  DOPPLER: The transmitral flow  pattern was normal      RIGHT VENTRICLE: The size was normal  Systolic function was normal  Wall thickness was normal      LEFT ATRIUM: Size was normal      RIGHT ATRIUM: Size was normal      MITRAL VALVE: Valve structure was normal  There was normal leaflet separation  DOPPLER: The transmitral velocity was within the normal range  There was no evidence for stenosis  There was no regurgitation      AORTIC VALVE: The valve was trileaflet  Leaflets exhibited normal thickness and normal cuspal separation  DOPPLER: Transaortic velocity was within the normal range  There was no evidence for stenosis  There was no regurgitation      TRICUSPID VALVE: The valve structure was normal  There was normal leaflet separation  DOPPLER: The transtricuspid velocity was within the normal range  There was no evidence for stenosis  There was no regurgitation      PULMONIC VALVE: Leaflets exhibited normal thickness, no calcification, and normal cuspal separation  DOPPLER: The transpulmonic velocity was within the normal range  There was no regurgitation      PERICARDIUM: There was no pericardial effusion  The pericardium was normal in appearance      AORTA: The root exhibited normal size      SYSTEMIC VEINS: IVC: The inferior vena cava was normal in size and course   Respirophasic changes were normal      SYSTEM MEASUREMENT TABLES     2D  Ao Diam: 2 9 cm  IVSd: 1 cm  LA Diam: 3 1 cm  LVIDd: 3 6 cm  LVIDs: 2 4 cm  LVPWd: 1 cm  SV(Teich): 40 7 ml     CW  TR Vmax: 2 1 m/s  TR maxP mmHg     PW  E': 0 1 m/s  E/E': 9 6  MV A Emanuel: 0 7 m/s  MV Dec Edwards: 4 3 m/s2  MV DecT: 182 4 ms  MV E Emanuel: 0 8 m/s  MV E/A Ratio: 1 1     [employer] Accredited Echocardiography Laboratory     Prepared and electronically signed by  Anish Livingston MD  Signed 22-Mar-2017 16:38:28      Linked Documents     View Image   Imaging     Echo complete with contrast if indicated (Order: [id number]) - 3/22/2017       Anesthesia Plan  ASA Score- 2     Anesthesia Type- general with ASA Monitors  Additional Monitors:   Airway Plan: ETT  Plan Factors-    Induction- intravenous  Postoperative Plan-     Informed Consent- Anesthetic plan and risks discussed with patient  I personally reviewed this patient with the CRNA  Discussed and agreed on the Anesthesia Plan with the CRNA  Anil Rg

## 2019-07-25 ENCOUNTER — ANESTHESIA (OUTPATIENT)
Dept: PERIOP | Facility: HOSPITAL | Age: 64
End: 2019-07-25
Payer: SELF-PAY

## 2019-07-25 ENCOUNTER — HOSPITAL ENCOUNTER (OUTPATIENT)
Facility: HOSPITAL | Age: 64
Setting detail: OBSERVATION
Discharge: HOME | End: 2019-07-26
Attending: PLASTIC SURGERY | Admitting: PLASTIC SURGERY
Payer: SELF-PAY

## 2019-07-25 DIAGNOSIS — E88.1 LIPODYSTROPHY: Primary | ICD-10-CM

## 2019-07-25 PROCEDURE — 94760 N-INVAS EAR/PLS OXIMETRY 1: CPT

## 2019-07-25 RX ORDER — HYDROMORPHONE HYDROCHLORIDE 2 MG/ML
INJECTION, SOLUTION INTRAMUSCULAR; INTRAVENOUS; SUBCUTANEOUS AS NEEDED
Status: DISCONTINUED | OUTPATIENT
Start: 2019-07-25 | End: 2019-07-25 | Stop reason: SURG

## 2019-07-25 RX ORDER — DIPHENHYDRAMINE HYDROCHLORIDE 50 MG/ML
25 INJECTION INTRAMUSCULAR; INTRAVENOUS EVERY 6 HOURS PRN
Status: DISCONTINUED | OUTPATIENT
Start: 2019-07-25 | End: 2019-07-26 | Stop reason: HOSPADM

## 2019-07-25 RX ORDER — GLYCOPYRROLATE 0.2 MG/ML
INJECTION INTRAMUSCULAR; INTRAVENOUS AS NEEDED
Status: DISCONTINUED | OUTPATIENT
Start: 2019-07-25 | End: 2019-07-25 | Stop reason: SURG

## 2019-07-25 RX ORDER — SODIUM CHLORIDE, SODIUM LACTATE, POTASSIUM CHLORIDE, CALCIUM CHLORIDE 600; 310; 30; 20 MG/100ML; MG/100ML; MG/100ML; MG/100ML
125 INJECTION, SOLUTION INTRAVENOUS CONTINUOUS
Status: DISCONTINUED | OUTPATIENT
Start: 2019-07-25 | End: 2019-07-26 | Stop reason: HOSPADM

## 2019-07-25 RX ORDER — SCOLOPAMINE TRANSDERMAL SYSTEM 1 MG/1
1 PATCH, EXTENDED RELEASE TRANSDERMAL
Status: DISCONTINUED | OUTPATIENT
Start: 2019-07-25 | End: 2019-07-25 | Stop reason: HOSPADM

## 2019-07-25 RX ORDER — PROPOFOL 10 MG/ML
INJECTION, EMULSION INTRAVENOUS AS NEEDED
Status: DISCONTINUED | OUTPATIENT
Start: 2019-07-25 | End: 2019-07-25 | Stop reason: SURG

## 2019-07-25 RX ORDER — CEFAZOLIN SODIUM 1 G/3ML
INJECTION, POWDER, FOR SOLUTION INTRAMUSCULAR; INTRAVENOUS AS NEEDED
Status: DISCONTINUED | OUTPATIENT
Start: 2019-07-25 | End: 2019-07-25 | Stop reason: SURG

## 2019-07-25 RX ORDER — ONDANSETRON 2 MG/ML
INJECTION INTRAMUSCULAR; INTRAVENOUS AS NEEDED
Status: DISCONTINUED | OUTPATIENT
Start: 2019-07-25 | End: 2019-07-25 | Stop reason: SURG

## 2019-07-25 RX ORDER — CEFAZOLIN SODIUM 1 G/50ML
1000 SOLUTION INTRAVENOUS ONCE
Status: COMPLETED | OUTPATIENT
Start: 2019-07-25 | End: 2019-07-25

## 2019-07-25 RX ORDER — LIDOCAINE HYDROCHLORIDE 10 MG/ML
INJECTION, SOLUTION INFILTRATION; PERINEURAL AS NEEDED
Status: DISCONTINUED | OUTPATIENT
Start: 2019-07-25 | End: 2019-07-25 | Stop reason: SURG

## 2019-07-25 RX ORDER — DEXTROSE MONOHYDRATE AND SODIUM CHLORIDE 5; .45 G/100ML; G/100ML
125 INJECTION, SOLUTION INTRAVENOUS CONTINUOUS
Status: DISCONTINUED | OUTPATIENT
Start: 2019-07-25 | End: 2019-07-26 | Stop reason: HOSPADM

## 2019-07-25 RX ORDER — HYDROMORPHONE HCL/PF 1 MG/ML
0.5 SYRINGE (ML) INJECTION
Status: DISCONTINUED | OUTPATIENT
Start: 2019-07-25 | End: 2019-07-25 | Stop reason: HOSPADM

## 2019-07-25 RX ORDER — SIMETHICONE 80 MG
40 TABLET,CHEWABLE ORAL EVERY 6 HOURS PRN
Status: DISCONTINUED | OUTPATIENT
Start: 2019-07-25 | End: 2019-07-26 | Stop reason: HOSPADM

## 2019-07-25 RX ORDER — MIDAZOLAM HYDROCHLORIDE 1 MG/ML
INJECTION INTRAMUSCULAR; INTRAVENOUS AS NEEDED
Status: DISCONTINUED | OUTPATIENT
Start: 2019-07-25 | End: 2019-07-25 | Stop reason: SURG

## 2019-07-25 RX ORDER — FENTANYL CITRATE/PF 50 MCG/ML
25 SYRINGE (ML) INJECTION
Status: DISCONTINUED | OUTPATIENT
Start: 2019-07-25 | End: 2019-07-25 | Stop reason: HOSPADM

## 2019-07-25 RX ORDER — ROCURONIUM BROMIDE 10 MG/ML
INJECTION, SOLUTION INTRAVENOUS AS NEEDED
Status: DISCONTINUED | OUTPATIENT
Start: 2019-07-25 | End: 2019-07-25 | Stop reason: SURG

## 2019-07-25 RX ORDER — DEXAMETHASONE SODIUM PHOSPHATE 10 MG/ML
INJECTION, SOLUTION INTRAMUSCULAR; INTRAVENOUS AS NEEDED
Status: DISCONTINUED | OUTPATIENT
Start: 2019-07-25 | End: 2019-07-25 | Stop reason: SURG

## 2019-07-25 RX ORDER — ONDANSETRON 2 MG/ML
4 INJECTION INTRAMUSCULAR; INTRAVENOUS EVERY 4 HOURS PRN
Status: DISCONTINUED | OUTPATIENT
Start: 2019-07-25 | End: 2019-07-26 | Stop reason: HOSPADM

## 2019-07-25 RX ORDER — CEFAZOLIN SODIUM 1 G/50ML
1000 SOLUTION INTRAVENOUS EVERY 8 HOURS
Status: COMPLETED | OUTPATIENT
Start: 2019-07-25 | End: 2019-07-26

## 2019-07-25 RX ORDER — DIPHENHYDRAMINE HYDROCHLORIDE 50 MG/ML
12.5 INJECTION INTRAMUSCULAR; INTRAVENOUS ONCE AS NEEDED
Status: DISCONTINUED | OUTPATIENT
Start: 2019-07-25 | End: 2019-07-25 | Stop reason: HOSPADM

## 2019-07-25 RX ORDER — NEOSTIGMINE METHYLSULFATE 1 MG/ML
INJECTION INTRAVENOUS AS NEEDED
Status: DISCONTINUED | OUTPATIENT
Start: 2019-07-25 | End: 2019-07-25 | Stop reason: SURG

## 2019-07-25 RX ORDER — HYDROMORPHONE HCL 110MG/55ML
1 PATIENT CONTROLLED ANALGESIA SYRINGE INTRAVENOUS EVERY 2 HOUR PRN
Status: DISCONTINUED | OUTPATIENT
Start: 2019-07-25 | End: 2019-07-26 | Stop reason: HOSPADM

## 2019-07-25 RX ORDER — LIDOCAINE HYDROCHLORIDE AND EPINEPHRINE 5; 5 MG/ML; UG/ML
INJECTION, SOLUTION INFILTRATION; PERINEURAL AS NEEDED
Status: DISCONTINUED | OUTPATIENT
Start: 2019-07-25 | End: 2019-07-25 | Stop reason: HOSPADM

## 2019-07-25 RX ORDER — OXYCODONE HYDROCHLORIDE AND ACETAMINOPHEN 5; 325 MG/1; MG/1
1 TABLET ORAL EVERY 4 HOURS PRN
Status: DISCONTINUED | OUTPATIENT
Start: 2019-07-25 | End: 2019-07-26 | Stop reason: HOSPADM

## 2019-07-25 RX ORDER — MAGNESIUM HYDROXIDE 1200 MG/15ML
LIQUID ORAL AS NEEDED
Status: DISCONTINUED | OUTPATIENT
Start: 2019-07-25 | End: 2019-07-25 | Stop reason: HOSPADM

## 2019-07-25 RX ORDER — ONDANSETRON 2 MG/ML
4 INJECTION INTRAMUSCULAR; INTRAVENOUS ONCE
Status: COMPLETED | OUTPATIENT
Start: 2019-07-25 | End: 2019-07-25

## 2019-07-25 RX ORDER — FENTANYL CITRATE 50 UG/ML
INJECTION, SOLUTION INTRAMUSCULAR; INTRAVENOUS AS NEEDED
Status: DISCONTINUED | OUTPATIENT
Start: 2019-07-25 | End: 2019-07-25 | Stop reason: SURG

## 2019-07-25 RX ADMIN — FENTANYL CITRATE 50 MCG: 50 INJECTION INTRAMUSCULAR; INTRAVENOUS at 10:02

## 2019-07-25 RX ADMIN — PROPOFOL 50 MG: 10 INJECTION, EMULSION INTRAVENOUS at 10:03

## 2019-07-25 RX ADMIN — SIMETHICONE CHEW TAB 80 MG 40 MG: 80 TABLET ORAL at 23:35

## 2019-07-25 RX ADMIN — DEXTROSE AND SODIUM CHLORIDE 125 ML/HR: 5; .45 INJECTION, SOLUTION INTRAVENOUS at 14:52

## 2019-07-25 RX ADMIN — DEXTROSE AND SODIUM CHLORIDE 125 ML/HR: 5; .45 INJECTION, SOLUTION INTRAVENOUS at 22:52

## 2019-07-25 RX ADMIN — CEFAZOLIN SODIUM 1000 MG: 1 SOLUTION INTRAVENOUS at 21:30

## 2019-07-25 RX ADMIN — FENTANYL CITRATE 50 MCG: 50 INJECTION INTRAMUSCULAR; INTRAVENOUS at 12:09

## 2019-07-25 RX ADMIN — FENTANYL CITRATE 50 MCG: 50 INJECTION INTRAMUSCULAR; INTRAVENOUS at 13:09

## 2019-07-25 RX ADMIN — FENTANYL CITRATE: 50 INJECTION INTRAMUSCULAR; INTRAVENOUS at 13:29

## 2019-07-25 RX ADMIN — ONDANSETRON HYDROCHLORIDE 4 MG: 2 INJECTION, SOLUTION INTRAMUSCULAR; INTRAVENOUS at 12:19

## 2019-07-25 RX ADMIN — HYDROMORPHONE HYDROCHLORIDE 1 MG: 2 INJECTION, SOLUTION INTRAMUSCULAR; INTRAVENOUS; SUBCUTANEOUS at 13:02

## 2019-07-25 RX ADMIN — ROCURONIUM BROMIDE 10 MG: 10 INJECTION, SOLUTION INTRAVENOUS at 10:51

## 2019-07-25 RX ADMIN — GLYCOPYRROLATE 0.4 MG: 0.2 INJECTION, SOLUTION INTRAMUSCULAR; INTRAVENOUS at 12:20

## 2019-07-25 RX ADMIN — ONDANSETRON 4 MG: 2 INJECTION INTRAMUSCULAR; INTRAVENOUS at 14:58

## 2019-07-25 RX ADMIN — ROCURONIUM BROMIDE 10 MG: 10 INJECTION, SOLUTION INTRAVENOUS at 09:36

## 2019-07-25 RX ADMIN — CEFAZOLIN SODIUM 1000 MG: 1 SOLUTION INTRAVENOUS at 09:06

## 2019-07-25 RX ADMIN — DIPHENHYDRAMINE HYDROCHLORIDE 25 MG: 50 INJECTION, SOLUTION INTRAMUSCULAR; INTRAVENOUS at 21:31

## 2019-07-25 RX ADMIN — SCOPALAMINE 1 PATCH: 1 PATCH, EXTENDED RELEASE TRANSDERMAL at 07:25

## 2019-07-25 RX ADMIN — FENTANYL CITRATE 50 MCG: 50 INJECTION INTRAMUSCULAR; INTRAVENOUS at 13:16

## 2019-07-25 RX ADMIN — SODIUM CHLORIDE, SODIUM LACTATE, POTASSIUM CHLORIDE, AND CALCIUM CHLORIDE: .6; .31; .03; .02 INJECTION, SOLUTION INTRAVENOUS at 10:00

## 2019-07-25 RX ADMIN — NEOSTIGMINE METHYLSULFATE 4 MG: 1 INJECTION INTRAVENOUS at 12:20

## 2019-07-25 RX ADMIN — HYDROMORPHONE HYDROCHLORIDE 1 MG: 2 INJECTION, SOLUTION INTRAMUSCULAR; INTRAVENOUS; SUBCUTANEOUS at 13:05

## 2019-07-25 RX ADMIN — HYDROMORPHONE HYDROCHLORIDE 1 MG: 2 INJECTION, SOLUTION INTRAMUSCULAR; INTRAVENOUS; SUBCUTANEOUS at 16:38

## 2019-07-25 RX ADMIN — FENTANYL CITRATE 50 MCG: 50 INJECTION INTRAMUSCULAR; INTRAVENOUS at 09:36

## 2019-07-25 RX ADMIN — SODIUM CHLORIDE, SODIUM LACTATE, POTASSIUM CHLORIDE, AND CALCIUM CHLORIDE: .6; .31; .03; .02 INJECTION, SOLUTION INTRAVENOUS at 11:09

## 2019-07-25 RX ADMIN — ROCURONIUM BROMIDE 10 MG: 10 INJECTION, SOLUTION INTRAVENOUS at 11:05

## 2019-07-25 RX ADMIN — MIDAZOLAM HYDROCHLORIDE 2 MG: 1 INJECTION, SOLUTION INTRAMUSCULAR; INTRAVENOUS at 08:55

## 2019-07-25 RX ADMIN — SODIUM CHLORIDE, SODIUM LACTATE, POTASSIUM CHLORIDE, AND CALCIUM CHLORIDE: .6; .31; .03; .02 INJECTION, SOLUTION INTRAVENOUS at 12:49

## 2019-07-25 RX ADMIN — ROCURONIUM BROMIDE 10 MG: 10 INJECTION, SOLUTION INTRAVENOUS at 10:02

## 2019-07-25 RX ADMIN — FENTANYL CITRATE: 50 INJECTION INTRAMUSCULAR; INTRAVENOUS at 14:06

## 2019-07-25 RX ADMIN — FENTANYL CITRATE 100 MCG: 50 INJECTION INTRAMUSCULAR; INTRAVENOUS at 08:59

## 2019-07-25 RX ADMIN — ONDANSETRON 4 MG: 2 INJECTION INTRAMUSCULAR; INTRAVENOUS at 20:24

## 2019-07-25 RX ADMIN — LIDOCAINE HYDROCHLORIDE 50 MG: 10 INJECTION, SOLUTION INFILTRATION; PERINEURAL at 08:59

## 2019-07-25 RX ADMIN — PROPOFOL 150 MG: 10 INJECTION, EMULSION INTRAVENOUS at 08:59

## 2019-07-25 RX ADMIN — ROCURONIUM BROMIDE 50 MG: 10 INJECTION, SOLUTION INTRAVENOUS at 08:59

## 2019-07-25 RX ADMIN — FENTANYL CITRATE: 50 INJECTION INTRAMUSCULAR; INTRAVENOUS at 13:42

## 2019-07-25 RX ADMIN — FENTANYL CITRATE: 50 INJECTION INTRAMUSCULAR; INTRAVENOUS at 13:50

## 2019-07-25 RX ADMIN — CEFAZOLIN SODIUM 1000 MG: 1 POWDER, FOR SOLUTION INTRAMUSCULAR; INTRAVENOUS at 12:58

## 2019-07-25 RX ADMIN — DEXAMETHASONE SODIUM PHOSPHATE 4 MG: 10 INJECTION, SOLUTION INTRAMUSCULAR; INTRAVENOUS at 09:08

## 2019-07-25 RX ADMIN — SODIUM CHLORIDE, SODIUM LACTATE, POTASSIUM CHLORIDE, AND CALCIUM CHLORIDE: .6; .31; .03; .02 INJECTION, SOLUTION INTRAVENOUS at 08:22

## 2019-07-25 RX ADMIN — FENTANYL CITRATE: 50 INJECTION INTRAMUSCULAR; INTRAVENOUS at 13:57

## 2019-07-25 RX ADMIN — FENTANYL CITRATE 50 MCG: 50 INJECTION INTRAMUSCULAR; INTRAVENOUS at 12:44

## 2019-07-25 RX ADMIN — SODIUM CHLORIDE, SODIUM LACTATE, POTASSIUM CHLORIDE, AND CALCIUM CHLORIDE: .6; .31; .03; .02 INJECTION, SOLUTION INTRAVENOUS at 10:33

## 2019-07-25 RX ADMIN — ONDANSETRON 4 MG: 2 INJECTION INTRAMUSCULAR; INTRAVENOUS at 23:35

## 2019-07-25 NOTE — OP NOTE
OPERATIVE REPORT  PATIENT NAME: Beka Wnag    :  1955  MRN: [id number]  Pt Location:  OR ROOM 11    SURGERY DATE: 2019    Surgeon(s) and Role:     * Diogenes Meraz MD - Primary    Preop Diagnosis:  Encounter for cosmetic surgery [Z41 1]    Post-Op Diagnosis Codes:     * Encounter for cosmetic surgery [Z41 1]    Procedure(s) (LRB):  ABDOMINOPLASTY WITH LIPOSUCTION (N/A)    Specimen(s):  * No specimens in log *    Estimated Blood Loss:   Minimal    Drains:  Closed/Suction Drain Right Hip Bulb 19 Fr  (Active)   Number of days: 0       Urethral Catheter Latex 16 Fr  (Active)   Number of days: 0       Anesthesia Type:   General    Operative Indications:  Encounter for cosmetic surgery [Z41 1]  Lipodystrophy of the abdomen    Operative Findings:  Normal    Complications:   None    Procedure and Technique:  Patient was marked in the holding area  Patient was draped and prepped in normal sterile fashion after general endotracheal anesthesia  Premarked area was injected with local anesthesia and the pannus was excised  The abdomen was undermined up to the rib margin  Hemostasis achieved with electrocautery and suture ligation  The midline was plicated with 0 Tycron sutures in a figure-eight fashion  Wounds were irrigated  The flap was approximated with oa Tycron sutures for fascia 2-0 Vicryl for deep dermis and a running subcuticular 3-0 Prolene suture for skin  The umbilicus was delivered through a separate stab wound in the midline anchored in place with 4-0 Vicryl buried sutures and 5 0 nylon half buried mattress sutures for skin  Prior to closure 23  Channel drain was placed through separate stab wound in the right flank anchored in place with 3-0 nylon suture  Sides were contoured with the tumescent liposuction    Compression garment was placed   I was present for the entire procedure    Patient Disposition:  PACU     SIGNATURE: Diogenes Meraz MD  DATE: 2019  TIME: 1:03 PM

## 2019-07-25 NOTE — NURSING NOTE
Patient received from PACU after receiving report from Desean Koo RN  Patient received in her bed with garment on and CLIVE intact for bloody drainage  Ellison intact with cathsecure draining clear yellow urine  patient drowsy but responds to verbal stimuli ie her name  HOB elevated at 30 degrees  Brought ice chips  Patient instructed how to use call light and placed near her left hand  SCD's applied  Vitals stable and will continue to monitor

## 2019-07-25 NOTE — ANESTHESIA POSTPROCEDURE EVALUATION
Post-Op Assessment Note    CV Status:  Stable    Pain management: adequate     Mental Status:  Alert and awake   Hydration Status:  Euvolemic   PONV Controlled:  Controlled   Airway Patency:  Patent   Post Op Vitals Reviewed: Yes      Staff: CRNA           BP   156/74   Temp   96 6   Pulse  60   Resp 18   SpO2 100

## 2019-07-26 VITALS
WEIGHT: 133 LBS | BODY MASS INDEX: 26.81 KG/M2 | HEIGHT: 59 IN | DIASTOLIC BLOOD PRESSURE: 51 MMHG | OXYGEN SATURATION: 99 % | HEART RATE: 73 BPM | TEMPERATURE: 98.3 F | SYSTOLIC BLOOD PRESSURE: 101 MMHG | RESPIRATION RATE: 18 BRPM

## 2019-07-26 PROBLEM — E88.1 LIPODYSTROPHY: Status: ACTIVE | Noted: 2019-07-26

## 2019-07-26 RX ORDER — OXYCODONE HYDROCHLORIDE AND ACETAMINOPHEN 5; 325 MG/1; MG/1
1 TABLET ORAL EVERY 4 HOURS PRN
Qty: 30 TABLET | Refills: 0 | Status: SHIPPED | OUTPATIENT
Start: 2019-07-26 | End: 2019-08-08

## 2019-07-26 RX ORDER — CEPHALEXIN 500 MG/1
500 CAPSULE ORAL EVERY 8 HOURS SCHEDULED
Qty: 21 CAPSULE | Refills: 0 | Status: SHIPPED | OUTPATIENT
Start: 2019-07-26 | End: 2019-08-02

## 2019-07-26 RX ADMIN — OXYCODONE HYDROCHLORIDE AND ACETAMINOPHEN 1 TABLET: 5; 325 TABLET ORAL at 16:49

## 2019-07-26 RX ADMIN — DEXTROSE AND SODIUM CHLORIDE 125 ML/HR: 5; .45 INJECTION, SOLUTION INTRAVENOUS at 06:14

## 2019-07-26 RX ADMIN — OXYCODONE HYDROCHLORIDE AND ACETAMINOPHEN 1 TABLET: 5; 325 TABLET ORAL at 05:01

## 2019-07-26 RX ADMIN — CEFAZOLIN SODIUM 1000 MG: 1 SOLUTION INTRAVENOUS at 04:57

## 2019-07-26 RX ADMIN — OXYCODONE HYDROCHLORIDE AND ACETAMINOPHEN 1 TABLET: 5; 325 TABLET ORAL at 09:04

## 2019-07-26 NOTE — SOCIAL WORK
LOS: 0 GMLOS: none    SW met w/ pt to present w/ and explain observation notice  Pt understood, had no other questions or concerns, pt signed  SW provided pt with a copy of signed observation notice and an observation information pamphlet  SW placed original signed observation notice in scan bin to be scanned into EHR

## 2019-07-26 NOTE — RESPIRATORY THERAPY NOTE
RT Protocol Note  Dalton Mcbride 62 y o  female MRN: [id number]  Unit/Bed#: 5T -01 Encounter: [id number]    Assessment    Active Problems:    * No active hospital problems   *      Home Pulmonary Medications:  None       Past Medical History:   Diagnosis Date    Adhesive capsulitis of left shoulder     Last assessed - 2/16/15    Allergic rhinitis     Last assessed - 3/17/15    Anxiety     Back pain     Disease of thyroid gland     hypo    Hyperlipidemia     borderline , no meds    LBBB (left bundle branch block)     chronic    Migraine     No known health problems     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 for tuberculosis     Psychiatric disorder     Rash of face 2019    Vitamin D deficiency      Social History     Socioeconomic History    Marital status:      Spouse name: None    Number of children: 4    Years of education: HS or GED    Highest education level: None   Occupational History    Occupation: Unemployed    Social Needs    Financial resource strain: None    Food insecurity:     Worry: None     Inability: None    Transportation needs:     Medical: None     Non-medical: None   Tobacco Use    Smoking status: Never Smoker    Smokeless tobacco: Never Used   Substance and Sexual Activity    Alcohol use: Never     Frequency: Never     Binge frequency: Never    Drug use: No    Sexual activity: None   Lifestyle    Physical activity:     Days per week: None     Minutes per session: None    Stress: None   Relationships    Social connections:     Talks on phone: None     Gets together: None     Attends Congregational service: None     Active member of club or organization: None     Attends meetings of clubs or organizations: None     Relationship status: None    Intimate partner violence:     Fear of current or ex partner: None     Emotionally abused: None     Physically abused: None     Forced sexual activity: None   Other Topics Concern    None   Social History Narrative    Functioning activity level - participates in sedentary/light activities outside of the home and moderate activities inside of the home  Lives with her partner and her 13y/o son  Subjective    Subjective Data: Protocl/ Assessment    Objective    Physical Exam:   Assessment Type: Assess only  General Appearance: Awake, Alert  Respiratory Pattern: Normal  Chest Assessment: Chest expansion symmetrical  Bilateral Breath Sounds: Clear, Diminished  Cough: None  O2 Device: RA    Vitals:  Blood pressure 133/61, pulse 75, temperature 97 8 °F (36 6 °C), temperature source Temporal, resp  rate 18, height 4' 11" (1 499 m), weight 60 3 kg (133 lb), SpO2 96 %  Imaging and other studies: I have personally reviewed pertinent reports  O2 Device: RA     Plan    Respiratory Plan: Discontinue Protocol        Resp Comments: Pt  seen per protocol, Pt has no Pulmonary history  stated she has no Asthma or Copd  has no history of ever smoking as well  Protocol can be discontinued

## 2019-07-26 NOTE — NURSING NOTE
Patient still c/o n/v after medicated with zofran benadryl  Patient vomited (brownish iin color) dr Sonal Scott made aware, no further order   Will monitor

## 2019-07-26 NOTE — NURSING NOTE
Patient received this evening alert and awake  C/o nausea, Dr Valentin Wharton was paged, Lucero Isabel ordered and administered  Denies any pain, states "just feeling gassy"  Patient in no distress  Encouraged to utilize call bell   Will continue to monitor

## 2019-07-26 NOTE — NURSING NOTE
Patient is A+Ox4, VSS   C/O abdominal surgical pain, Percocet given with stated relief  CLIVE drain x 1 is patent for sanguinous drainage  Patient education on draining CLIVE drain for home care, patient demonstrated understanding and competency  Patient is voiding adequate amounts of clear, yellow urine  Ellison removed this am and no issues noted  Patient ambulating independently  IVFs infusing as ordered  Will continue to monitor closely

## 2019-07-26 NOTE — UTILIZATION REVIEW
Initial Clinical Review    Elective OUTPATIENT surgical procedure  Age/Sex: 62 y o  female  Surgery Date: 7/25/19  Procedure: ABDOMINOPLASTY WITH LIPOSUCTION   Anesthesia: General    Admission Orders: Date/Time/Statement: 7/25/19 @ 1312 OBSERVATION   Orders Placed This Encounter   Procedures    Place in Observation     Standing Status:   Standing     Number of Occurrences:   1     Order Specific Question:   Admitting Physician     Answer:   Fareed Alvarez [[id number]]     Order Specific Question:   Level of Care     Answer:   Med Surg [[id number]]     Vital Signs: BP (!) 95/48 (BP Location: Right arm)   Pulse 75   Temp 98 7 °F (37 1 °C) (Temporal)   Resp 18   Ht 4' 11" (1 499 m)   Wt 60 3 kg (133 lb)   SpO2 98%   BMI 26 86 kg/m²   Diet: House  Mobility: HOB Semi Sam; BRP  DVT Prophylaxis: SCDs  Medications/Pain Control:   Current Facility-Administered Medications:  dextrose 5 % and sodium chloride 0 45 % 125 mL/hr Intravenous Continuous   diphenhydrAMINE 25 mg Intravenous Q6H PRN x1   HYDROmorphone 1 mg Intravenous Q2H PRN x1   lactated ringers 125 mL/hr Intravenous Continuous   ondansetron 4 mg Intravenous Q4H PRN x2   oxyCODONE-acetaminophen 1 tablet Oral Q4H PRN x2   simethicone 40 mg Oral Q6H PRN x1       Network Utilization Review Department  Phone: [phone]; Fax [phone]  [email or web address]  ATTENTION: Please call with any questions or concerns to [phone]  and carefully listen to the prompts so that you are directed to the right person  Send all requests for admission clinical reviews, approved or denied determinations and any other requests to fax [phone]   All voicemails are confidential

## 2019-07-29 ENCOUNTER — TRANSITIONAL CARE MANAGEMENT (OUTPATIENT)
Dept: INTERNAL MEDICINE CLINIC | Facility: CLINIC | Age: 64
End: 2019-07-29

## 2019-08-08 ENCOUNTER — OFFICE VISIT (OUTPATIENT)
Dept: INTERNAL MEDICINE CLINIC | Facility: CLINIC | Age: 64
End: 2019-08-08
Payer: COMMERCIAL

## 2019-08-08 VITALS
SYSTOLIC BLOOD PRESSURE: 140 MMHG | BODY MASS INDEX: 26.41 KG/M2 | TEMPERATURE: 98.2 F | WEIGHT: 131 LBS | HEIGHT: 59 IN | RESPIRATION RATE: 14 BRPM | HEART RATE: 76 BPM | DIASTOLIC BLOOD PRESSURE: 74 MMHG

## 2019-08-08 DIAGNOSIS — F41.8 DEPRESSION WITH ANXIETY: ICD-10-CM

## 2019-08-08 DIAGNOSIS — E88.1 LIPODYSTROPHY: ICD-10-CM

## 2019-08-08 DIAGNOSIS — E03.4 HYPOTHYROIDISM DUE TO ACQUIRED ATROPHY OF THYROID: Primary | ICD-10-CM

## 2019-08-08 DIAGNOSIS — K59.03 DRUG-INDUCED CONSTIPATION: ICD-10-CM

## 2019-08-08 DIAGNOSIS — E55.9 VITAMIN D DEFICIENCY: ICD-10-CM

## 2019-08-08 PROCEDURE — 99495 TRANSJ CARE MGMT MOD F2F 14D: CPT | Performed by: INTERNAL MEDICINE

## 2019-08-08 PROCEDURE — 1111F DSCHRG MED/CURRENT MED MERGE: CPT | Performed by: INTERNAL MEDICINE

## 2019-08-08 RX ORDER — ERGOCALCIFEROL 1.25 MG/1
50000 CAPSULE ORAL
Qty: 6 CAPSULE | Refills: 0 | Status: SHIPPED | OUTPATIENT
Start: 2019-08-08 | End: 2020-01-19

## 2019-08-08 RX ORDER — POLYETHYLENE GLYCOL 3350 17 G/17G
POWDER, FOR SOLUTION ORAL
Qty: 510 G | Refills: 1 | Status: SHIPPED | OUTPATIENT
Start: 2019-08-08 | End: 2019-12-11 | Stop reason: ALTCHOICE

## 2019-08-08 RX ORDER — SENNA AND DOCUSATE SODIUM 50; 8.6 MG/1; MG/1
1 TABLET, FILM COATED ORAL DAILY
Qty: 7 TABLET | Refills: 0 | Status: SHIPPED | OUTPATIENT
Start: 2019-08-08 | End: 2021-03-10 | Stop reason: ALTCHOICE

## 2019-08-08 NOTE — PATIENT INSTRUCTIONS
Continue follow-up the recommendations of the surgeon    For your constipation I gave you MiraLax if you do not have a bowel movement in 2 or 3 days she can take it continue taking the Senokot that the surgeon suggested    No change in medications for my part    I started you on vitamin-D 33753 units to take it once a month    I will check a level when you come back

## 2019-08-08 NOTE — PROGRESS NOTES
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 she is still have a lot of pain specially when she cough or gets up she has no nausea or vomiting is constipated surgeon recommend the Senokot which is a great idea I gave her MiraLax if she does not have a bowel movement in 2 or 3 days she has sparingly is off the Percocet I told her she needed more Percocet she needs to talk to the surgeon  Percocet is very addictive he told her only to take Tylenol he wants to hold off on NSAIDs at this time according to the patient    Problem 2  Vitamin-D deficiency a started on vitamin-D 74246 units monthly I will ask her to check a level when she comes back  Problem 3  Hypothyroidism continue levothyroxine will check a TSH when she comes back  Problem 4  Anxiety she takes the Xanax only on a p r n  Basis if she needs an SSRI and I will be willing to prescribe her while she recuperated from the surgery but she says she is fine  She works in [employer] told her that to call the surgeon about getting some time off until she recuperate from the surgery  TCM Call (since 7/8/2019)     Date and time call was made  7/29/2019 10:43 AM    Patient was hospitialized at  [address] [place] [address]    Date of Admission  07/25/19    Date of discharge  07/26/19    Diagnosis  ABDOMINOPLASTY WITH LIPOSUCTION     Disposition  Home    Were the patients medications reviewed and updated  Yes    Current Symptoms  None      TCM Call (since 7/8/2019)     Should patient be enrolled in anticoag monitoring? No    Scheduled for follow up? Yes    I have advised the patient to call PCP with any new or worsening symptoms  JOO Sweeney MA          No problem-specific Assessment & Plan notes found for this encounter  There are no diagnoses linked to this encounter  Subjective:      Patient ID: Zack Kathleen is a 62 y o  female  No chief complaint on file  Current Outpatient Medications:     ALPRAZolam (XANAX) 0 5 mg tablet, Take a half or 1 tablet twice a day as needed for anxiety  , Disp: 30 tablet, Rfl: 1    carbamide peroxide (DEBROX) 6 5 % otic solution, Administer 5 drops into both ears 2 (two) times a day, Disp: 15 mL, Rfl: 0    Cholecalciferol (VITAMIN D3) 2000 units TABS, Take 1 tablet (2,000 Units total) by mouth daily, Disp: 30 tablet, Rfl: 0    fluticasone (FLONASE) 50 mcg/act nasal spray, 2 sprays into each nostril daily for 30 days, Disp: 16 Bottle, Rfl: 0    levothyroxine 50 mcg tablet, Take 2 Tablets on Fridays and 1 tablet the rest of the days of the week/ dosage change 6/21, Disp: 30 tablet, Rfl: 1    PREMARIN vaginal cream, , Disp: , Rfl:     HPI    The following portions of the patient's history were reviewed and updated as appropriate: allergies, current medications, past family history, past medical history, past social history, past surgical history and problem list     Review of Systems   Constitutional: Negative  Negative for activity change, appetite change, fatigue, fever and unexpected weight change  HENT: Negative for congestion, ear pain, hearing loss, mouth sores, postnasal drip, rhinorrhea, sore throat, trouble swallowing and voice change  Eyes: Negative for pain, redness and visual disturbance  Respiratory: Negative for cough, chest tightness, shortness of breath and wheezing  Cardiovascular: Negative for chest pain, palpitations and leg swelling  Gastrointestinal: Positive for abdominal pain  Negative for abdominal distention, blood in stool, constipation, diarrhea and nausea  Endocrine: Negative for cold intolerance, heat intolerance, polydipsia, polyphagia and polyuria  Genitourinary: Negative for difficulty urinating, dysuria, flank pain, frequency, hematuria and urgency  Musculoskeletal: Negative for arthralgias, back pain, gait problem, joint swelling and myalgias  Skin: Negative for color change and pallor  Neurological: Negative for dizziness, tremors, seizures, syncope, weakness, numbness and headaches  Hematological: Negative for adenopathy  Does not bruise/bleed easily  Psychiatric/Behavioral: Negative  Negative for sleep disturbance  The patient is not nervous/anxious  Objective:    Results for orders placed or performed during the hospital encounter of 06/14/19   ECG 12 lead   Result Value Ref Range    Ventricular Rate 66 BPM    Atrial Rate 66 BPM    CO Interval 152 ms    QRSD Interval 134 ms    QT Interval 438 ms    QTC Interval 459 ms    P [place] 69 degrees    QRS Axis -12 degrees    T Wave Axis 68 degrees       There were no vitals taken for this visit  Physical Exam   Constitutional: She is oriented to person, place, and time  She appears well-developed and well-nourished  HENT:   Head: Normocephalic  Right Ear: External ear normal    Left Ear: External ear normal    Nose: Nose normal    Mouth/Throat: Oropharynx is clear and moist  No oropharyngeal exudate  Eyes: Pupils are equal, round, and reactive to light  Conjunctivae and EOM are normal    Neck: Normal range of motion  Neck supple  No thyromegaly present  Cardiovascular: Normal rate, regular rhythm, normal heart sounds and intact distal pulses  Exam reveals no gallop and no friction rub  No murmur heard  S1-S2 regular rhythm  Extremities no edema   Pulmonary/Chest: Effort normal and breath sounds normal  No respiratory distress  She has no wheezes  She has no rales  Lungs are clear no wheezing rales or rhonchi   Abdominal: Soft   Bowel sounds are normal  She exhibits no distension and no mass  There is no tenderness  There is no rebound and no guarding  Unable to examine the abdomen very well be she has a course that and she is very tender she is going to see the surgeon on Monday as a follow-up visit postop there is no drainage no fever chills a lot incisional pain from her abdominal plastic   Musculoskeletal: Normal range of motion  Lymphadenopathy:     She has no cervical adenopathy  Neurological: She is alert and oriented to person, place, and time  Skin: Skin is warm and dry  Psychiatric: She has a normal mood and affect  Her behavior is normal  Judgment normal    Nursing note and vitals reviewed

## 2019-08-22 ENCOUNTER — TELEPHONE (OUTPATIENT)
Dept: GASTROENTEROLOGY | Facility: CLINIC | Age: 64
End: 2019-08-22

## 2019-11-01 DIAGNOSIS — E03.4 HYPOTHYROIDISM DUE TO ACQUIRED ATROPHY OF THYROID: ICD-10-CM

## 2019-11-01 RX ORDER — LEVOTHYROXINE SODIUM 0.05 MG/1
TABLET ORAL
Qty: 90 TABLET | Refills: 1 | Status: SHIPPED | OUTPATIENT
Start: 2019-11-01 | End: 2020-04-21

## 2019-11-15 NOTE — PROGRESS NOTES
Assessment    1  Eczema of external ear, left (380 22) (H60 542)   2  Anxiety (300 00) (F41 9)   3  Chronic left-sided low back pain without sciatica (724 2,338 29) (M54 5,G89 29)   4  Hypothyroidism (244 9) (E03 9)    Plan  Acute back pain with sciatica, left, Anxiety    · Follow-up visit in 2 months Evaluation and Treatment  Follow-up  Status: Hold For - Scheduling   Requested for: 11Oct2016  Anxiety, Chronic left-sided low back pain without sciatica, Hypothyroidism    · (1) BASIC METABOLIC PROFILE; Status:Active; Requested for:11Oct2016;    · (1) CBC/PLT/DIFF; Status:Active; Requested for:11Oct2016;    · (1) MAGNESIUM; Status:Active; Requested for:11Oct2016;    · (1) T4, FREE; Status:Active; Requested for:11Oct2016;    · (1) TSH; Status:Active; Requested for:11Oct2016;    · (1) VITAMIN D 25-HYDROXY; Status:Active; Requested for:11Oct2016; Anxiety, Chronic left-sided low back pain without sciatica, Hypothyroidism, Vitamin D deficiency    · Vitamin D3 18091 UNIT Oral Capsule; Take 1 tablet monthly  Eczema of external ear, left    · Hydrocortisone 2 5 % External Ointment; APPLY SPARINGLY TO THE AFFECTED AREA(S)  TWICE DAILY  Health Maintenance    · * MAMMO SCREENING BILATERAL W CAD; Status:Hold For - Scheduling; Requested for:11Oct2016;   Need for prophylactic vaccination and inoculation against influenza    · Fluzone Quadrivalent Intramuscular Suspension    Discussion/Summary  Discussion Summary:   Medication I Gralise Neurontin-type preparation to take 300 for the first 9 days and then 600 mg     Give her hydrocortisone ointment for the eczema anterior to the left ear  Continue on Voltaren for pain control may hold off on the Robaxin and tramadol only when she has severe pain pain management told her to hold her which is fine    Her pain is only aggravated when she starts walking  If she is sitting resting the pain is not trigger only by walking or activity        Chief Complaint  Chief Complaint Free Text Called patient and voicemail was left with lab results.   Note Form: 2 month follow up for back pain & anxiety  Not using Tramadol  c/o still has back pain  NEEDS COLONOSCOPY  History of Present Illness  HPI: #1 back pain persists has seen the neurosurgeon who felt surgery was not needed better with pain management  Pain management and injected the sacroiliac joint pain has improved in her back but now she has radicular pain going down the left leg from a L5-S1 disc noted on the MRI she needs to have the back injected  That will be referred to pain management again in the meantime I gave her GRALISSE which is the Neurontin-type preparation to take 300 mg for the first day and then 600 mg at bedtime to see if that takes the edge off the pain  She showed me a little eczema-like  To the left ear which I gave her some hydrocortisone ointment  Level thyroxine for hypothyroidism continue the same we'll check a TSH and T4 before she comes back    Anxiety takes Xanax on a when necessary basis  Pain management also to take the anti-inflammatory agent but to stop the tramadol at the time of those habit form and can only take it only when she has severe pain  Review of Systems  Complete-Female:   Constitutional: No fever, no chills, feels well, no tiredness, no recent weight gain or weight loss  Eyes: No complaints of eye pain, no red eyes, no eyesight problems, no discharge, no dry eyes, no itching of eyes  ENT: no complaints of earache, no loss of hearing, no nose bleeds, no nasal discharge, no sore throat, no hoarseness  Cardiovascular: No complaints of slow heart rate, no fast heart rate, no chest pain, no palpitations, no leg claudication, no lower extremity edema  Respiratory: No complaints of shortness of breath, no wheezing, no cough, no SOB on exertion, no orthopnea, no PND  Gastrointestinal: No complaints of abdominal pain, no constipation, no nausea or vomiting, no diarrhea, no bloody stools     Genitourinary: No complaints of dysuria, no incontinence, no pelvic pain, no dysmenorrhea, no vaginal discharge or bleeding  Musculoskeletal: No complaints of arthralgias, no myalgias, no joint swelling or stiffness, no limb pain or swelling and as noted in HPI    The patient presents with complaints of lower back arthralgias  Integumentary: a rash, but No complaints of skin rash or lesions, no itching, no skin wounds, no breast pain or lump and as noted in HPI  Neurological: No complaints of headache, no confusion, no convulsions, no numbness, no dizziness or fainting, no tingling, no limb weakness, no difficulty walking  Psychiatric: Not suicidal, no sleep disturbance, no anxiety or depression, no change in personality, no emotional problems  Endocrine: No complaints of proptosis, no hot flashes, no muscle weakness, no deepening of the voice, no feelings of weakness  Hematologic/Lymphatic: No complaints of swollen glands, no swollen glands in the neck, does not bleed easily, does not bruise easily  Active Problems    1  Abdominal pain, LLQ (left lower quadrant) (789 04) (R10 32)   2  Acute back pain with sciatica, left (724 3) (M54 42)   3  Acute left-sided low back pain with left-sided sciatica (724 2,724 3) (M54 42)   4  Aftercare following surgery of the musculoskeletal system (V58 78) (Z47 89)   5  Allergic rhinitis (477 9) (J30 9)   6  Anemia (285 9) (D64 9)   7  Anxiety (300 00) (F41 9)   8  Cervical cancer screening (V76 2) (Z12 4)   9  Chronic left-sided low back pain without sciatica (724 2,338 29) (M54 5,G89 29)   10  Complete tear of left rotator cuff (727 61) (M75 122)   11  Depression with anxiety (300 4) (F41 8)   12  Dermatomycosis (111 9) (B36 9)   13  Encounter for screening mammogram for malignant neoplasm of breast (V76 12) (Z12 31)   14  Hypothyroidism (244 9) (E03 9)   15  Impacted cerumen of left ear (380 4) (H61 22)   16  Left bundle branch block (426 3) (I44 7)   17  Left rotator cuff tear (840 4) (M75 102)   18  Microscopic hematuria (599 72) (R31 29)   19  Need for prophylactic vaccination and inoculation against influenza (V04 81) (Z23)   20  Otitis externa (380 10) (H60 90)   21  Pain in joint of left shoulder (719 41) (M25 512)   22  Rotator cuff tendinitis, unspecified laterality (726 10) (M75 80)   23  Sacroiliitis (720 2) (M46 1)   24  Shoulder pain, right (719 41) (M25 511)   25  Spondylosis (721 90) (M47 9)   26  Surgical aftercare, injury or trauma (V58 43) (Z48 89)   27  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, bilateral (454 8) (I83 813)   28  Varicose veins without complication (066 9) (K23 80)   29  Vesicles (709 8) (R23 8)   30  Vitamin D deficiency (268 9) (E55 9)    Past Medical History    1  Adhesive capsulitis of left shoulder (726 0) (M75 02)   2  History of allergic rhinitis (V12 69) (Z87 09)   3  History of No significant past medical history   4  History of 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 for tuberculosis (795 51) (R76 11)    Surgical History    1  History of Arthroscopy Shoulder   2  History of Breast Surgery Lumpectomy   3  History of Hysterectomy   4  History of Shoulder Arthroscopy With Rotator Cuff Repair    Family History  Mother    1  Family history of Breast Cancer (V16 3)   2  Family history of Depression  Daughter    3  Family history of Child 1  At Age 28   2  Family history of Thyroid Disorder (V18 19)  Son    5  Family history of Child 1  At Age ___   10  Family history of Kidney Cancer (V16 51)  Sibling    7  Family history of Cancer  Sister    8  Family history of Depression   9  Family history of Diabetes Mellitus (V18 0)   10  Family history of Thyroid Disorder (V18 19)  Brother    6  Family history of Cancer   12  Family history of Sister  At Age ___  Maternal Grandmother    15  Family history of heart attack (V17 3) (Z82 49)   14  Family history of liver cancer (V16 0) (Z80 0)  Maternal Grandfather    13  Family history of stroke (V17 1) (Z82 3)  Family History    12  Family history of Cancer   17  Family history of Essential Hypertension  Family History Reviewed: The family history was reviewed and updated today  Social History    ·    · Four children   · Functioning activity level   · High school or GED   · Living situation   · Never a smoker   · Never Drank Alcohol   · No drug use   · Not currently employed   · Occupation:   · Working Full Time    Current Meds   1  ALPRAZolam 0 5 MG Oral Tablet; TAKE 1 TABLET TWICE DAILY AS NEEDED; Therapy: 75WHU1717 to (Evaluate:65Ilz1955); Last Rx:07Jun2016 Ordered   2  Clotrimazole-Betamethasone 1-0 05 % External Cream; APPLY Y RUB IN A THIN FILM HASTA   AFFECTED AREAS TWICE A DAY(MORNING AND EVENING); Therapy: 82EWF3931 to (Cynthia Lopez)  Requested for: 06Aug2015; Last Rx:06Aug2015   Ordered   3  Diclofenac Sodium 50 MG Oral Tablet Delayed Release; TAKE 1 TABLET 3 TIMES DAILY AS NEEDED; Therapy: 01YDT4814 to (Chanelle Other)  Requested for: 09Aug2016; Last Rx:09Aug2016   Ordered   4  Fluticasone Propionate 50 MCG/ACT Nasal Suspension; USE TWO SPRAYS EACH NOSTRIL ONCE   DAILY; Therapy: 23JLF6951 to (Last Rx:13Jun2016)  Requested for: 13Jun2016 Ordered   5  Gralise Starter 300 & 600 MG Oral Miscellaneous; Take 1 tablet daily of 300 mg for 9 days then take   600 mg tablets 1 tablet daily; Therapy: 67GXC4414 to Recorded   6  Levothyroxine Sodium 50 MCG Oral Tablet; CARIDAD 1 TABLETA POR VIA ORAL DIARIAMENTETAKE   ONE TABLET BY MOUTH DAILY; Therapy: 18LBZ0450 to (Evaluate:57Wtc2327)  Requested for: 90VTI7922; Last Rx:13Jun2016   Ordered   7  Methocarbamol 500 MG Oral Tablet; TAKE 1 TABLET TWICE DAILY AS DIRECTED; Therapy: 16SHB6908 to (Chanelle Other)  Requested for: 90Lbq5018; Last Rx:09Aug2016   Ordered   8  Neomycin-Polymyxin-HC 1 % Otic Solution; INSTILL 4 DROPS IN LEFT EAR 3-4 TIMES DAILY;    Therapy: 65DCV2920 to (Last Rx:17Aug2016)  Requested for: 17Aug2016; Status: ACTIVE -   Transmit to Pharmacy - Awaiting Verification Ordered  Medication List Reviewed: The medication list was reviewed and updated today  Allergies    1  No Known Drug Allergies    Vitals  Vital Signs    Recorded: 69XMX4467 96:37MC   Systolic 522   Diastolic 76   Heart Rate 76   Respiration 15   Temperature 97 9 F   Height 4 ft 11 5 in   Weight 122 lb 4 oz   BMI Calculated 24 28   BSA Calculated 1 51     Physical Exam    Constitutional   General appearance: No acute distress, well appearing and well nourished  Eyes   Conjunctiva and lids: No swelling, erythema or discharge  Pupils and irises: Equal, round and reactive to light  Ears, Nose, Mouth, and Throat   External inspection of ears and nose: Normal     Otoscopic examination: Tympanic membranes translucent with normal light reflex  Canals patent without erythema  Nasal mucosa, septum, and turbinates: Normal without edema or erythema  Oropharynx: Normal with no erythema, edema, exudate or lesions  Pulmonary   Respiratory effort: No increased work of breathing or signs of respiratory distress  Auscultation of lungs: Clear to auscultation  Cardiovascular   Palpation of heart: Normal PMI, no thrills  Auscultation of heart: Normal rate and rhythm, normal S1 and S2, without murmurs  Examination of extremities for edema and/or varicosities: Normal     Carotid pulses: Normal     Abdomen   Abdomen: Non-tender, no masses  Liver and spleen: No hepatomegaly or splenomegaly  Lymphatic   Palpation of lymph nodes in neck: No lymphadenopathy  Musculoskeletal   Gait and station: Normal     Digits and nails: Normal without clubbing or cyanosis  Inspection/palpation of joints, bones, and muscles: Normal   regular test and 90 degrees  on the right okay on the left thought to 60 degrees  before she gets pain  She has good strength she hasthe lower exremities     Skin   Skin and subcutaneous tissue: Abnormal   Clinical impression: eczema (on the left ear and on the left face )  Neurologic   Cranial nerves: Cranial nerves 2-12 intact  Reflexes: 2+ and symmetric  Sensation: No sensory loss  Psychiatric   Orientation to person, place, and time: Normal     Mood and affect: Abnormal   Mood and Affect: anxious and concerned  Results/Data  Procedure Flowsheet 60SPO6532 02:37PM Carry Emndel     Test Name Result Flag Reference   Oswestry Score 58       * MRI LUMBAR SPINE WO CONTRAST 23FRK6261 06:31PM Haroon Grace Order Number: [id number]    - Patient Instructions: To schedule this appointment, please contact Central Scheduling at [phone]  Test Name Result Flag Reference   MRI LUMBAR SPINE [address] (Report)     MRI LUMBAR SPINE WITHOUT CONTRAST     INDICATION: 81-year-old female left-sided back pain and sciatica for several months   COMPARISON: 6/10/2013 x-rays     TECHNIQUE: Sagittal T1, sagittal T2, sagittal inversion recovery, axial T1 and axial T2, coronal T2       IMAGE QUALITY: Diagnostic     FINDINGS:     ALIGNMENT:    Mild thoracolumbar levoscoliosis, apex approximately L1-2   No compression fracture  No spondylolysis or spondylolisthesis  MARROW SIGNAL:    There is edema involving the left L5 pedicle, the left superior aspect of S1, as well as the adjacent left-sided facet joint at L5-S1, likely degenerative / reactive  DISTAL CORD AND CONUS: Normal size and signal within the distal cord and conus  The conus ends at the L1 level  PARASPINAL SOFT TISSUES: Paraspinal soft tissues are unremarkable  SACRUM: Normal signal within the sacrum  No evidence of insufficiency or stress fracture  LOWER THORACIC DISC SPACES: Normal disc height and signal  No disc herniation, canal stenosis or foraminal narrowing       LUMBAR DISC SPACES:        L1-L2: Normal      L2-L3: Normal      L3-L4: Normal disc, mild degenerative facet hypertrophy, no stenosis     L4-L5: Mild degenerative disc and facet disease, bulging annulus, no stenosis     L5-S1: Mild degenerative disc disease, moderate degenerative facet hypertrophy, small broad left-sided disc protrusion, no overt neural element impingement  IMPRESSION:   Multilevel degenerative spondylosis, less conspicuous by x-rays     Mild thoracolumbar levoscoliosis     Small broad left-sided disc protrusion L5-S1     Probable degenerative/reactive edema left facet joint L5-S1       Workstation performed: [id number]     Signed by:   Konrad Phillips MD   8/17/16     (1) CBC/PLT/DIFF [id number] 12:23PM Luke Villegas Order Number: [id number]     Test Name Result Flag Reference   WBC COUNT 6 43 Thousand/uL  4 31-10 16   RBC COUNT 4 49 Million/uL  3 81-5 12   HEMOGLOBIN 11 7 g/dL  11 5-15 4   HEMATOCRIT 36 9 %  34 8-46  1   MCV 82 fL  82-98   MCH 26 1 pg L 26 8-34 3   MCHC 31 7 g/dL  31 4-37 4   RDW 14 7 %  11 6-15 1   MPV 12 6 fL  8 9-12 7   PLATELET COUNT 539 Thousands/uL  149-390   NEUTROPHILS RELATIVE PERCENT 54 %  43-75   LYMPHOCYTES RELATIVE PERCENT 35 %  14-44   MONOCYTES RELATIVE PERCENT 9 %  4-12   EOSINOPHILS RELATIVE PERCENT 2 %  0-6   BASOPHILS RELATIVE PERCENT 0 %  0-1   NEUTROPHILS ABSOLUTE COUNT 3 44 Thousands/?L  1 85-7 62   LYMPHOCYTES ABSOLUTE COUNT 2 27 Thousands/?L  0 60-4 47   MONOCYTES ABSOLUTE COUNT 0 56 Thousand/?L  0 17-1 22   EOSINOPHILS ABSOLUTE COUNT 0 14 Thousand/?L  0 00-0 61   BASOPHILS ABSOLUTE COUNT 0 02 Thousands/?L  0 00-0 10   - Patient Instructions: This bloodwork is non-fasting  Please drink two glasses of water morning of bloodwork  - Patient Instructions: This bloodwork is non-fasting  Please drink two glasses of water morning of bloodwork       (1) COMPREHENSIVE METABOLIC PANEL 38WRM0158 31:04KC Alan Schafer 57 Order Number: [id number]     Test Name Result Flag Reference   GLUCOSE,RANDM 74 mg/dL     If the patient is fasting, the ADA then defines impaired fasting glucose as > 100 mg/dL and diabetes as > or equal to 123 mg/dL  SODIUM 144 mmol/L  136-145   POTASSIUM 4 2 mmol/L  3 5-5 3   CHLORIDE 107 mmol/L  100-108   CARBON DIOXIDE 28 mmol/L  21-32   ANION GAP (CALC) 9 mmol/L  4-13   BLOOD UREA NITROGEN 16 mg/dL  5-25   CREATININE 0 64 mg/dL  0 60-1 30   Standardized to IDMS reference method   CALCIUM 9 0 mg/dL  8 3-10 1   BILI, TOTAL 0 43 mg/dL  0 20-1 00   ALK PHOSPHATAS 99 U/L     ALT (SGPT) 31 U/L  12-78   AST(SGOT) 21 U/L  5-45   ALBUMIN 3 8 g/dL  3 5-5 0   TOTAL PROTEIN 7 5 g/dL  6 4-8 2   eGFR Non-African American      >60 0 ml/min/1 73sq m   - Patient Instructions: This bloodwork is non-fasting  Please drink two glasses of water morning of bloodwork  National Kidney Disease Education Program recommendations are as follows:  GFR calculation is accurate only with a steady state creatinine  Chronic Kidney disease less than 60 ml/min/1 73 sq  meters  Kidney failure less than 15 ml/min/1 73 sq  meters  (1) GGT 11Yym9908 12:23PM Re Quintana Order Number: [id number]     Test Name Result Flag Reference   GGT 22 U/L  5-85     (1) T4, FREE [id number] 12:23PM Alan Medina 57 Order Number: [id number]     Test Name Result Flag Reference   T4,FREE 1 24 ng/dL  0 76-1 46     (1) TSH 91Wwt6254 12:23PM Alan Medina Order Number: [id number]     Test Name Result Flag Reference   TSH 3 361 uIU/mL  0 358-3 740   - Patient Instructions: This bloodwork is non-fasting  Please drink two glasses of water morning of bloodwork  - Patient Instructions: This bloodwork is non-fasting  Please drink two glasses of water morning of bloodwork  Patients undergoing fluorescein dye angiography may retain small amounts of fluorescein in the body for 48-72 hours post procedure  Samples containing fluorescein can produce falsely depressed TSH values  If the patient had this procedure,a specimen should be resubmitted post fluorescein clearance            The recommended reference ranges for TSH during pregnancy are as follows:  First trimester 0 1 to 2 5 uIU/mL  Second trimester  0 2 to 3 0 uIU/mL  Third trimester 0 3 to 3 0 uIU/m     (1) URINALYSIS (will reflex a microscopy if leukocytes, occult blood, protein or nitrites are not within normal limits) 09Aug2016 12:23PM Marie MATA Order Number: [id number]     Test Name Result Flag Reference   COLOR Yellow     CLARITY Clear     SPECIFIC GRAVITY UA 1 010  1 003-1 030   PH UA 6 0  4 5-8 0   LEUKOCYTE ESTERASE UA Negative  Negative   NITRITE UA Negative  Negative   PROTEIN UA Negative mg/dl  Negative   GLUCOSE UA Negative mg/dl  Negative   KETONES UA Negative mg/dl  Negative   UROBILINOGEN UA 0 2 E U /dl  0 2, 1 0 E U /dl   BILIRUBIN UA Negative  Negative   BLOOD UA Small A Negative, Trace-Intact   BACTERIA None Seen /hpf  None Seen, Occasional   EPITHELIAL CELLS Occasional /hpf  None Seen, Occasional   RBC UA 0-1 /hpf A None Seen   WBC UA None Seen /hpf  None Seen     (1) VITAMIN D 25-HYDROXY 09Aug2016 12:23PM Coy Hodgkins, [address] Order Number: [id number]     Test Name Result Flag Reference   VIT D 25-HYDROX 28 7 ng/mL L 30 0-100 0   This assay is a certified procedure of the CDC Vitamin D Standardization Certification Program (VDSCP)     Deficiency <20ng/ml   Insufficiency 20-30ng/ml   Sufficient  ng/ml     *Patients undergoing fluorescein dye angiography may retain small amounts of fluorescein in the body for 48-72 hours post procedure  Samples containing fluorescein can produce falsely elevated Vitamin D values  If the patient had this procedure, a specimen should be resubmitted post fluorescein clearance  (1) MAGNESIUM 09Aug2016 12:23PM Dantejeyson Jeison Order Number: [id number]     Test Name Result Flag Reference   MAGNESIUM 1 9 mg/dL  1 6-2 6   - Patient Instructions: This bloodwork is non-fasting  Please drink two glasses of water morning of bloodwork       (1) SED RATE 09Aug2016 12:23PM Coy Hodgkins, [address] Order Number: [id number]     Test Name Result Flag Reference   SED RATE 9 mm/hour  0-20     (1) C-REACTIVE PROTEIN 29Rih5091 12:23PM Aaron Service   TW Order Number: [id number]     Test Name Result Flag Reference   C-REACT PROTEIN <3 0 mg/L  <3 0   - Patient Instructions: This bloodwork is non-fasting  Please drink two glasses of water morning of bloodwork  PHQ-2 Adult Depression Screening 97Www0812 10:34AM User s     Test Name Result Flag Reference   PHQ-2 Adult Depression Score 0     Over the last two weeks, how often have you been bothered by any of the following problems?   Little interest or pleasure in doing things: Not at all - 0  Feeling down, depressed, or hopeless: Not at all - 0   PHQ-2 Adult Depression Screening Negative       Future Appointments    Date/Time Provider Specialty Site   11/30/2016 10:45 AM Jenni Garcia, DO Vascular Surgery [facility] [address] [place] [address]     Signatures   Electronically signed by : JOO Ramos ; Oct 11 2016 11:24AM EST                       (Author)

## 2019-12-11 ENCOUNTER — OFFICE VISIT (OUTPATIENT)
Dept: INTERNAL MEDICINE CLINIC | Facility: CLINIC | Age: 64
End: 2019-12-11
Payer: COMMERCIAL

## 2019-12-11 VITALS
OXYGEN SATURATION: 98 % | DIASTOLIC BLOOD PRESSURE: 70 MMHG | SYSTOLIC BLOOD PRESSURE: 128 MMHG | HEART RATE: 83 BPM | HEIGHT: 59 IN | WEIGHT: 127.8 LBS | BODY MASS INDEX: 25.76 KG/M2

## 2019-12-11 DIAGNOSIS — L30.9 ECZEMA, UNSPECIFIED TYPE: ICD-10-CM

## 2019-12-11 DIAGNOSIS — I45.4: ICD-10-CM

## 2019-12-11 DIAGNOSIS — M54.50 LOW BACK PAIN WITHOUT SCIATICA, UNSPECIFIED BACK PAIN LATERALITY, UNSPECIFIED CHRONICITY: ICD-10-CM

## 2019-12-11 DIAGNOSIS — E03.4 HYPOTHYROIDISM DUE TO ACQUIRED ATROPHY OF THYROID: ICD-10-CM

## 2019-12-11 DIAGNOSIS — I10 ACCELERATED HYPERTENSION: ICD-10-CM

## 2019-12-11 DIAGNOSIS — Z00.00 HEALTHCARE MAINTENANCE: Primary | ICD-10-CM

## 2019-12-11 DIAGNOSIS — F41.8 DEPRESSION WITH ANXIETY: ICD-10-CM

## 2019-12-11 PROCEDURE — 3078F DIAST BP <80 MM HG: CPT | Performed by: INTERNAL MEDICINE

## 2019-12-11 PROCEDURE — 3074F SYST BP LT 130 MM HG: CPT | Performed by: INTERNAL MEDICINE

## 2019-12-11 PROCEDURE — 1036F TOBACCO NON-USER: CPT | Performed by: INTERNAL MEDICINE

## 2019-12-11 PROCEDURE — 3008F BODY MASS INDEX DOCD: CPT | Performed by: INTERNAL MEDICINE

## 2019-12-11 PROCEDURE — 99214 OFFICE O/P EST MOD 30 MIN: CPT | Performed by: INTERNAL MEDICINE

## 2019-12-11 RX ORDER — LEVOTHYROXINE SODIUM 0.05 MG/1
TABLET ORAL
Qty: 30 TABLET | Refills: 1
Start: 2019-12-11 | End: 2020-04-21

## 2019-12-11 RX ORDER — ALPRAZOLAM 0.5 MG/1
TABLET ORAL
Qty: 30 TABLET | Refills: 1 | Status: SHIPPED | OUTPATIENT
Start: 2019-12-11 | End: 2020-04-21 | Stop reason: SDUPTHER

## 2019-12-11 NOTE — PROGRESS NOTES
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 but a plastic surgeon she recuperated from that her abdomen looks great  She has no pain  Problem 3  Anxiety takes Xanax on a p r n  Basis I reordered the prescription  No evidence of depression  Which is good news    Bundle branch block and near syncope no recurrences  She denies any chest pain palpitation shortness of breath    She forgot to go for the mammography appointment I told her the orders still air so she will initiate that before the next office visit        No problem-specific Assessment & Plan notes found for this encounter  Diagnoses and all orders for this visit:    Healthcare maintenance  -     Hepatitis C antibody; Future  -     HIV 1/2 AG-AB combo; Future    Hypothyroidism due to acquired atrophy of thyroid  -     levothyroxine 50 mcg tablet; Take 2 Tablets on Fridays and 1 tablet the rest of the days of the week/ dosage change 6/21    Chronic bundle branch block    Depression with anxiety  -     levothyroxine 50 mcg tablet; Take 2 Tablets on Fridays and 1 tablet the rest of the days of the week/ dosage change 6/21  -     ALPRAZolam (XANAX) 0 5 mg tablet; Take a half or 1 tablet twice a day as needed for anxiety  Accelerated hypertension  -     levothyroxine 50 mcg tablet; Take 2 Tablets on Fridays and 1 tablet the rest of the days of the week/ dosage change 6/21    Low back pain without sciatica, unspecified back pain laterality, unspecified chronicity  -     levothyroxine 50 mcg tablet;  Take 2 Tablets on Fridays and 1 tablet the rest of the days of the week/ dosage change 6/21    Eczema, unspecified type  -     hydrocortisone 2 5 % cream; Apply topically 2 (two) times a day For 1 week only every month as needed          Subjective:      Patient ID: Chani Madrigal is a 62 y o  female  No chief complaint on file  Current Outpatient Medications:     ALPRAZolam (XANAX) 0 5 mg tablet, Take a half or 1 tablet twice a day as needed for anxiety  , Disp: 30 tablet, Rfl: 1    ergocalciferol (VITAMIN D2) 50,000 units, Take 1 capsule (50,000 Units total) by mouth every 28 days for 180 days, Disp: 6 capsule, Rfl: 0    fluticasone (FLONASE) 50 mcg/act nasal spray, 2 sprays into each nostril daily for 30 days, Disp: 16 Bottle, Rfl: 0    hydrocortisone 2 5 % cream, Apply topically 2 (two) times a day For 1 week only every month as needed, Disp: 30 g, Rfl: 0    levothyroxine 50 mcg tablet, TAKE 1 TABLET BY MOUTH EVERY DAY, Disp: 90 tablet, Rfl: 1    levothyroxine 50 mcg tablet, Take 2 Tablets on Fridays and 1 tablet the rest of the days of the week/ dosage change 6/21, Disp: 30 tablet, Rfl: 1    PREMARIN vaginal cream, , Disp: , Rfl:     senna-docusate sodium (SENOKOT-S) 8 6-50 mg per tablet, Take 1 tablet by mouth daily, Disp: 7 tablet, Rfl: 0    HPI    The following portions of the patient's history were reviewed and updated as appropriate: allergies, current medications, past family history, past medical history, past social history, past surgical history and problem list     Review of Systems   Constitutional: Negative  Negative for activity change, appetite change, fatigue, fever and unexpected weight change  HENT: Negative for congestion, ear pain, hearing loss, mouth sores, postnasal drip, rhinorrhea, sore throat, trouble swallowing and voice change  Eyes: Negative for pain, redness and visual disturbance  Respiratory: Negative for cough, chest tightness, shortness of breath and wheezing  Cardiovascular: Negative for chest pain, palpitations and leg swelling     Gastrointestinal: Negative for abdominal distention, abdominal pain, blood in stool, constipation, diarrhea and nausea  Endocrine: Negative for cold intolerance, heat intolerance, polydipsia, polyphagia and polyuria  Genitourinary: Negative for difficulty urinating, dysuria, flank pain, frequency, hematuria and urgency  Musculoskeletal: Negative for arthralgias, back pain, gait problem, joint swelling and myalgias  Skin: Negative for color change and pallor  Neurological: Negative for dizziness, tremors, seizures, syncope, weakness, numbness and headaches  Hematological: Negative for adenopathy  Does not bruise/bleed easily  Psychiatric/Behavioral: Negative  Negative for sleep disturbance  The patient is not nervous/anxious  Objective:    Results for orders placed or performed during the hospital encounter of 06/14/19   ECG 12 lead   Result Value Ref Range    Ventricular Rate 66 BPM    Atrial Rate 66 BPM    MO Interval 152 ms    QRSD Interval 134 ms    QT Interval 438 ms    QTC Interval 459 ms    P [place] 69 degrees    QRS Axis -12 degrees    T Wave Axis 68 degrees    hi    /70 (BP Location: Left arm, Patient Position: Sitting, Cuff Size: Standard)   Pulse 83   Ht 4' 11" (1 499 m)   Wt 58 kg (127 lb 12 8 oz)   SpO2 98%   BMI 25 81 kg/m²      Physical Exam   Constitutional: She is oriented to person, place, and time  She appears well-developed and well-nourished  HENT:   Head: Normocephalic  Right Ear: External ear normal    Left Ear: External ear normal    Nose: Nose normal    Mouth/Throat: Oropharynx is clear and moist  No oropharyngeal exudate  Eyes: Pupils are equal, round, and reactive to light  Conjunctivae and EOM are normal    Neck: Normal range of motion  Neck supple  No thyromegaly present  Cardiovascular: Normal rate, regular rhythm, normal heart sounds and intact distal pulses  Exam reveals no gallop and no friction rub  No murmur heard    S1-S2 regular rhythm  Extremities no edema   Pulmonary/Chest: Effort normal and breath sounds normal  No respiratory distress  She has no wheezes  She has no rales  Lungs are clear no wheezing rales or rhonchi   Abdominal: Soft  Bowel sounds are normal  She exhibits no distension and no mass  There is no tenderness  There is no rebound and no guarding  Flat soft nontender   Musculoskeletal: Normal range of motion  Lymphadenopathy:     She has no cervical adenopathy  Neurological: She is alert and oriented to person, place, and time  Skin: Skin is warm and dry  Psychiatric: She has a normal mood and affect  Her behavior is normal  Judgment normal    Nursing note and vitals reviewed

## 2019-12-11 NOTE — PATIENT INSTRUCTIONS
Blood pressure is control  Get her ointment use it only 1 week in a month use moisturizer cream like Aveeno or Nutri term for your dry skin    Continue on your other medications get her lab work done especially your thyroid function     I will see her back in 4 months

## 2020-01-03 ENCOUNTER — TELEPHONE (OUTPATIENT)
Dept: GASTROENTEROLOGY | Facility: CLINIC | Age: 65
End: 2020-01-03

## 2020-01-03 NOTE — TELEPHONE ENCOUNTER
Per Kat Bedoya at [facility] no prior auth required for  or   Prior [place] Poag is required for [address] , [id number], [id number]  [facility] #[id number] obtained and pending, clinicals faxed to [phone]   Call reference number #[id number]

## 2020-01-06 ENCOUNTER — TRANSCRIBE ORDERS (OUTPATIENT)
Dept: GASTROENTEROLOGY | Facility: CLINIC | Age: 65
End: 2020-01-06

## 2020-01-07 NOTE — TELEPHONE ENCOUNTER
As of 1/7/2020 Auth #[id number] has been approved for codes [phone] [id number] per Ochoa Patel at [facility] call reference #[id number]

## 2020-01-10 ENCOUNTER — TELEPHONE (OUTPATIENT)
Dept: GASTROENTEROLOGY | Facility: CLINIC | Age: 65
End: 2020-01-10

## 2020-01-10 NOTE — TELEPHONE ENCOUNTER
Patients GI provider:  Dr Cool Common     Number to return call: [phone]    Reason for call: Pt calling to r/s their procedure     Scheduled procedure/appointment date if applicable: Apt/procedure - 1/16

## 2020-01-14 NOTE — TELEPHONE ENCOUNTER
Pt r/s colonoscopy to 2/13/20 with Dr Sana Etienne at [facility]  Pt has prep and instructions

## 2020-01-15 ENCOUNTER — PREP FOR PROCEDURE (OUTPATIENT)
Dept: GASTROENTEROLOGY | Facility: CLINIC | Age: 65
End: 2020-01-15

## 2020-01-15 ENCOUNTER — TELEPHONE (OUTPATIENT)
Dept: GASTROENTEROLOGY | Facility: CLINIC | Age: 65
End: 2020-01-15

## 2020-01-15 DIAGNOSIS — Z12.11 SCREENING FOR COLON CANCER: Primary | ICD-10-CM

## 2020-01-15 NOTE — TELEPHONE ENCOUNTER
Patients GI provider:  Dr Charu Gordon    Number to return call: [phone]    Reason for call: Karma Carrel from GI lab requesting to speak with you regarding patient who stated she rescheduled her procedure on 2/13/20        Scheduled procedure/appointmen 1/16/20

## 2020-01-15 NOTE — TELEPHONE ENCOUNTER
I spoke with pt this am and she wanted to r/s her colonoscopy with Dr Estee Richard to 2/13/20  Original order canceled, new order entered to make sure pt stays on schedule

## 2020-01-16 ENCOUNTER — HOSPITAL ENCOUNTER (OUTPATIENT)
Dept: GASTROENTEROLOGY | Facility: HOSPITAL | Age: 65
Setting detail: OUTPATIENT SURGERY
Discharge: HOME | End: 2020-01-16
Attending: INTERNAL MEDICINE

## 2020-01-16 NOTE — TELEPHONE ENCOUNTER
lmom for pt to call my direct line to r/s colonoscopy 2/13/20 due to Dr Davidson Credit being overbooked

## 2020-01-19 DIAGNOSIS — E55.9 VITAMIN D DEFICIENCY: ICD-10-CM

## 2020-01-19 RX ORDER — ERGOCALCIFEROL 1.25 MG/1
CAPSULE ORAL
Qty: 3 CAPSULE | Refills: 0 | Status: SHIPPED | OUTPATIENT
Start: 2020-01-19 | End: 2020-04-14

## 2020-03-26 ENCOUNTER — TRANSCRIBE ORDERS (OUTPATIENT)
Dept: ADMINISTRATIVE | Facility: HOSPITAL | Age: 65
End: 2020-03-26

## 2020-03-26 DIAGNOSIS — Z12.31 VISIT FOR SCREENING MAMMOGRAM: Primary | ICD-10-CM

## 2020-04-14 DIAGNOSIS — E55.9 VITAMIN D DEFICIENCY: ICD-10-CM

## 2020-04-14 RX ORDER — ERGOCALCIFEROL 1.25 MG/1
CAPSULE ORAL
Qty: 3 CAPSULE | Refills: 0 | Status: SHIPPED | OUTPATIENT
Start: 2020-04-14 | End: 2020-07-01

## 2020-04-21 ENCOUNTER — TELEMEDICINE (OUTPATIENT)
Dept: INTERNAL MEDICINE CLINIC | Facility: CLINIC | Age: 65
End: 2020-04-21
Payer: COMMERCIAL

## 2020-04-21 DIAGNOSIS — M65.331 TRIGGER MIDDLE FINGER OF RIGHT HAND: ICD-10-CM

## 2020-04-21 DIAGNOSIS — E03.4 HYPOTHYROIDISM DUE TO ACQUIRED ATROPHY OF THYROID: Primary | ICD-10-CM

## 2020-04-21 DIAGNOSIS — E88.1 LIPODYSTROPHY: ICD-10-CM

## 2020-04-21 DIAGNOSIS — R55 NEAR SYNCOPE: ICD-10-CM

## 2020-04-21 DIAGNOSIS — F41.8 DEPRESSION WITH ANXIETY: ICD-10-CM

## 2020-04-21 DIAGNOSIS — I45.4: ICD-10-CM

## 2020-04-21 DIAGNOSIS — J30.9 ALLERGIC RHINITIS: ICD-10-CM

## 2020-04-21 PROCEDURE — 99213 OFFICE O/P EST LOW 20 MIN: CPT | Performed by: INTERNAL MEDICINE

## 2020-04-21 RX ORDER — NAPROXEN 500 MG/1
TABLET ORAL
Qty: 60 TABLET | Refills: 5 | Status: SHIPPED | OUTPATIENT
Start: 2020-04-21

## 2020-04-21 RX ORDER — LEVOTHYROXINE SODIUM 0.05 MG/1
50 TABLET ORAL DAILY
Qty: 90 TABLET | Refills: 1 | Status: SHIPPED | OUTPATIENT
Start: 2020-04-21 | End: 2020-06-03

## 2020-04-21 RX ORDER — FLUTICASONE PROPIONATE 50 MCG
2 SPRAY, SUSPENSION (ML) NASAL DAILY
Qty: 16 BOTTLE | Refills: 0 | Status: SHIPPED | OUTPATIENT
Start: 2020-04-21 | End: 2020-05-18

## 2020-04-21 RX ORDER — ALPRAZOLAM 0.5 MG/1
TABLET ORAL
Qty: 30 TABLET | Refills: 1 | Status: SHIPPED | OUTPATIENT
Start: 2020-04-21 | End: 2020-12-03 | Stop reason: SDUPTHER

## 2020-05-18 DIAGNOSIS — J30.9 ALLERGIC RHINITIS: ICD-10-CM

## 2020-05-18 RX ORDER — FLUTICASONE PROPIONATE 50 MCG
SPRAY, SUSPENSION (ML) NASAL
Qty: 16 ML | Refills: 0 | Status: SHIPPED | OUTPATIENT
Start: 2020-05-18 | End: 2020-06-02

## 2020-05-27 ENCOUNTER — CONSULT (OUTPATIENT)
Dept: OBGYN CLINIC | Facility: HOSPITAL | Age: 65
End: 2020-05-27
Attending: INTERNAL MEDICINE
Payer: COMMERCIAL

## 2020-05-27 VITALS
BODY MASS INDEX: 25.6 KG/M2 | SYSTOLIC BLOOD PRESSURE: 128 MMHG | WEIGHT: 127 LBS | DIASTOLIC BLOOD PRESSURE: 75 MMHG | HEIGHT: 59 IN | HEART RATE: 82 BPM

## 2020-05-27 DIAGNOSIS — M65.331 TRIGGER MIDDLE FINGER OF RIGHT HAND: ICD-10-CM

## 2020-05-27 PROCEDURE — 20550 NJX 1 TENDON SHEATH/LIGAMENT: CPT | Performed by: ORTHOPAEDIC SURGERY

## 2020-05-27 PROCEDURE — 99203 OFFICE O/P NEW LOW 30 MIN: CPT | Performed by: ORTHOPAEDIC SURGERY

## 2020-05-27 RX ORDER — BETAMETHASONE SODIUM PHOSPHATE AND BETAMETHASONE ACETATE 3; 3 MG/ML; MG/ML
6 INJECTION, SUSPENSION INTRA-ARTICULAR; INTRALESIONAL; INTRAMUSCULAR; SOFT TISSUE
Status: COMPLETED | OUTPATIENT
Start: 2020-05-27 | End: 2020-05-27

## 2020-05-27 RX ORDER — LIDOCAINE HYDROCHLORIDE 20 MG/ML
1 INJECTION, SOLUTION EPIDURAL; INFILTRATION; INTRACAUDAL; PERINEURAL
Status: COMPLETED | OUTPATIENT
Start: 2020-05-27 | End: 2020-05-27

## 2020-05-27 RX ADMIN — BETAMETHASONE SODIUM PHOSPHATE AND BETAMETHASONE ACETATE 6 MG: 3; 3 INJECTION, SUSPENSION INTRA-ARTICULAR; INTRALESIONAL; INTRAMUSCULAR; SOFT TISSUE at 15:05

## 2020-05-27 RX ADMIN — LIDOCAINE HYDROCHLORIDE 1 ML: 20 INJECTION, SOLUTION EPIDURAL; INFILTRATION; INTRACAUDAL; PERINEURAL at 15:05

## 2020-05-29 ENCOUNTER — APPOINTMENT (OUTPATIENT)
Dept: LAB | Facility: HOSPITAL | Age: 65
End: 2020-05-29
Payer: COMMERCIAL

## 2020-05-29 DIAGNOSIS — E55.9 VITAMIN D DEFICIENCY: ICD-10-CM

## 2020-05-29 DIAGNOSIS — E03.4 HYPOTHYROIDISM DUE TO ACQUIRED ATROPHY OF THYROID: ICD-10-CM

## 2020-05-29 DIAGNOSIS — Z00.00 HEALTHCARE MAINTENANCE: ICD-10-CM

## 2020-05-29 DIAGNOSIS — K59.03 DRUG-INDUCED CONSTIPATION: ICD-10-CM

## 2020-05-29 LAB
25(OH)D3 SERPL-MCNC: 32.2 NG/ML (ref 30–100)
ALBUMIN SERPL BCP-MCNC: 4 G/DL (ref 3–5.2)
ALP SERPL-CCNC: 103 U/L (ref 43–122)
ALT SERPL W P-5'-P-CCNC: 20 U/L (ref 9–52)
ANION GAP SERPL CALCULATED.3IONS-SCNC: 5 MMOL/L (ref 5–14)
AST SERPL W P-5'-P-CCNC: 26 U/L (ref 14–36)
BASOPHILS # BLD AUTO: 0 THOUSANDS/ΜL (ref 0–0.1)
BASOPHILS NFR BLD AUTO: 1 % (ref 0–1)
BILIRUB SERPL-MCNC: 0.7 MG/DL
BUN SERPL-MCNC: 21 MG/DL (ref 5–25)
CALCIUM SERPL-MCNC: 9.3 MG/DL (ref 8.4–10.2)
CHLORIDE SERPL-SCNC: 107 MMOL/L (ref 97–108)
CO2 SERPL-SCNC: 29 MMOL/L (ref 22–30)
CREAT SERPL-MCNC: 0.73 MG/DL (ref 0.6–1.2)
EOSINOPHIL # BLD AUTO: 0 THOUSAND/ΜL (ref 0–0.4)
EOSINOPHIL NFR BLD AUTO: 1 % (ref 0–6)
ERYTHROCYTE [DISTWIDTH] IN BLOOD BY AUTOMATED COUNT: 15.1 %
GFR SERPL CREATININE-BSD FRML MDRD: 87 ML/MIN/1.73SQ M
GLUCOSE P FAST SERPL-MCNC: 93 MG/DL (ref 70–99)
HCT VFR BLD AUTO: 36.8 % (ref 36–46)
HCV AB SER QL: NORMAL
HGB BLD-MCNC: 11.8 G/DL (ref 12–16)
LYMPHOCYTES # BLD AUTO: 3.9 THOUSANDS/ΜL (ref 0.5–4)
LYMPHOCYTES NFR BLD AUTO: 43 % (ref 25–45)
MCH RBC QN AUTO: 26.1 PG (ref 26–34)
MCHC RBC AUTO-ENTMCNC: 32 G/DL (ref 31–36)
MCV RBC AUTO: 81 FL (ref 80–100)
MONOCYTES # BLD AUTO: 1 THOUSAND/ΜL (ref 0.2–0.9)
MONOCYTES NFR BLD AUTO: 11 % (ref 1–10)
NEUTROPHILS # BLD AUTO: 4.2 THOUSANDS/ΜL (ref 1.8–7.8)
NEUTS SEG NFR BLD AUTO: 46 % (ref 45–65)
PLATELET # BLD AUTO: 256 THOUSANDS/UL (ref 150–450)
PMV BLD AUTO: 10.2 FL (ref 8.9–12.7)
POTASSIUM SERPL-SCNC: 4.2 MMOL/L (ref 3.6–5)
PROT SERPL-MCNC: 7.4 G/DL (ref 5.9–8.4)
RBC # BLD AUTO: 4.52 MILLION/UL (ref 4–5.2)
SODIUM SERPL-SCNC: 141 MMOL/L (ref 137–147)
T4 FREE SERPL-MCNC: 0.94 NG/DL (ref 0.76–1.46)
TSH SERPL DL<=0.05 MIU/L-ACNC: 9.4 UIU/ML (ref 0.47–4.68)
WBC # BLD AUTO: 9.2 THOUSAND/UL (ref 4.5–11)

## 2020-05-29 PROCEDURE — 84439 ASSAY OF FREE THYROXINE: CPT

## 2020-05-29 PROCEDURE — 87389 HIV-1 AG W/HIV-1&-2 AB AG IA: CPT

## 2020-05-29 PROCEDURE — 36415 COLL VENOUS BLD VENIPUNCTURE: CPT

## 2020-05-29 PROCEDURE — 85025 COMPLETE CBC W/AUTO DIFF WBC: CPT

## 2020-05-29 PROCEDURE — 86803 HEPATITIS C AB TEST: CPT

## 2020-05-29 PROCEDURE — 84443 ASSAY THYROID STIM HORMONE: CPT

## 2020-05-29 PROCEDURE — 80053 COMPREHEN METABOLIC PANEL: CPT

## 2020-05-29 PROCEDURE — 82306 VITAMIN D 25 HYDROXY: CPT

## 2020-05-31 LAB — HIV 1+2 AB+HIV1 P24 AG SERPL QL IA: NORMAL

## 2020-06-01 DIAGNOSIS — J30.9 ALLERGIC RHINITIS: ICD-10-CM

## 2020-06-02 DIAGNOSIS — J30.9 ALLERGIC RHINITIS: ICD-10-CM

## 2020-06-02 RX ORDER — FLUTICASONE PROPIONATE 50 MCG
SPRAY, SUSPENSION (ML) NASAL
Qty: 48 ML | Refills: 1 | Status: SHIPPED | OUTPATIENT
Start: 2020-06-02 | End: 2020-06-02 | Stop reason: SDUPTHER

## 2020-06-02 RX ORDER — FLUTICASONE PROPIONATE 50 MCG
2 SPRAY, SUSPENSION (ML) NASAL DAILY
Qty: 48 ML | Refills: 1 | Status: SHIPPED | OUTPATIENT
Start: 2020-06-02

## 2020-06-03 ENCOUNTER — TELEPHONE (OUTPATIENT)
Dept: INTERNAL MEDICINE CLINIC | Facility: CLINIC | Age: 65
End: 2020-06-03

## 2020-06-03 DIAGNOSIS — E03.4 HYPOTHYROIDISM DUE TO ACQUIRED ATROPHY OF THYROID: Primary | ICD-10-CM

## 2020-06-03 RX ORDER — LEVOTHYROXINE SODIUM 0.07 MG/1
75 TABLET ORAL DAILY
Qty: 90 TABLET | Refills: 1 | Status: SHIPPED | OUTPATIENT
Start: 2020-06-03 | End: 2020-12-03 | Stop reason: SDUPTHER

## 2020-07-01 DIAGNOSIS — E55.9 VITAMIN D DEFICIENCY: ICD-10-CM

## 2020-07-01 RX ORDER — ERGOCALCIFEROL 1.25 MG/1
CAPSULE ORAL
Qty: 3 CAPSULE | Refills: 0 | Status: SHIPPED | OUTPATIENT
Start: 2020-07-01 | End: 2020-10-03

## 2020-07-08 ENCOUNTER — OFFICE VISIT (OUTPATIENT)
Dept: OBGYN CLINIC | Facility: HOSPITAL | Age: 65
End: 2020-07-08
Payer: COMMERCIAL

## 2020-07-08 VITALS
WEIGHT: 129 LBS | DIASTOLIC BLOOD PRESSURE: 72 MMHG | BODY MASS INDEX: 26 KG/M2 | HEIGHT: 59 IN | HEART RATE: 76 BPM | SYSTOLIC BLOOD PRESSURE: 125 MMHG

## 2020-07-08 DIAGNOSIS — M65.331 TRIGGER FINGER, RIGHT MIDDLE FINGER: Primary | ICD-10-CM

## 2020-07-08 PROCEDURE — 3074F SYST BP LT 130 MM HG: CPT | Performed by: ORTHOPAEDIC SURGERY

## 2020-07-08 PROCEDURE — 3078F DIAST BP <80 MM HG: CPT | Performed by: ORTHOPAEDIC SURGERY

## 2020-07-08 PROCEDURE — 3008F BODY MASS INDEX DOCD: CPT | Performed by: ORTHOPAEDIC SURGERY

## 2020-07-08 PROCEDURE — 1036F TOBACCO NON-USER: CPT | Performed by: ORTHOPAEDIC SURGERY

## 2020-07-08 PROCEDURE — 20550 NJX 1 TENDON SHEATH/LIGAMENT: CPT | Performed by: ORTHOPAEDIC SURGERY

## 2020-07-08 PROCEDURE — 99213 OFFICE O/P EST LOW 20 MIN: CPT | Performed by: ORTHOPAEDIC SURGERY

## 2020-07-08 RX ORDER — BETAMETHASONE SODIUM PHOSPHATE AND BETAMETHASONE ACETATE 3; 3 MG/ML; MG/ML
6 INJECTION, SUSPENSION INTRA-ARTICULAR; INTRALESIONAL; INTRAMUSCULAR; SOFT TISSUE
Status: COMPLETED | OUTPATIENT
Start: 2020-07-08 | End: 2020-07-08

## 2020-07-08 RX ORDER — LIDOCAINE HYDROCHLORIDE 10 MG/ML
1 INJECTION, SOLUTION INFILTRATION; PERINEURAL
Status: COMPLETED | OUTPATIENT
Start: 2020-07-08 | End: 2020-07-08

## 2020-07-08 RX ADMIN — BETAMETHASONE SODIUM PHOSPHATE AND BETAMETHASONE ACETATE 6 MG: 3; 3 INJECTION, SUSPENSION INTRA-ARTICULAR; INTRALESIONAL; INTRAMUSCULAR; SOFT TISSUE at 14:05

## 2020-07-08 RX ADMIN — LIDOCAINE HYDROCHLORIDE 1 ML: 10 INJECTION, SOLUTION INFILTRATION; PERINEURAL at 14:05

## 2020-07-08 NOTE — PROGRESS NOTES
ASSESSMENT/PLAN:    Assessment:   R long TF    Plan:   Pt elects 2nd steroid injx to see if she gets further improvement  C/W activities as tolerated  Did explain this would be the last injx for this finger    Follow Up:  6-8 weeks prn      _____________________________________________________  CHIEF COMPLAINT:  Chief Complaint   Patient presents with    Right Middle Finger - Follow-up         SUBJECTIVE:  Trey Ly is a 60 y o  female who presents for follow up regarding R long TF  This started around the beginning of this year  She had her first injection 6 weeks ago which she states did help, but not 100%  States she no longer has pain, but still has some clicking sensations, worse at night and in the morning        PAST MEDICAL HISTORY:  Past Medical History:   Diagnosis Date    Adhesive capsulitis of left shoulder     Last assessed - 2/16/15    Allergic rhinitis     Last assessed - 3/17/15    Anxiety     Back pain     Disease of thyroid gland     hypo    Hyperlipidemia     borderline , no meds    LBBB (left bundle branch block)     chronic    Migraine     No known health problems     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 for tuberculosis     Psychiatric disorder     Rash of face 2019    Vitamin D deficiency        PAST SURGICAL HISTORY:  Past Surgical History:   Procedure Laterality Date    BREAST LUMPECTOMY      COLONOSCOPY      HYSTERECTOMY      [place] SKIN TISSUE PROCEDURE UNLISTED N/A 7/25/2019    Procedure: ABDOMINOPLASTY WITH LIPOSUCTION;  Surgeon: Mathew Saldana MD;  Location: [address] [place] [address];  Service: Plastics    SHOULDER ARTHROSCOPY      Last assessed - 12/23/14    SHOULDER ARTHROSCOPY W/ ROTATOR CUFF REPAIR      Last assessed - 4/4/16       FAMILY HISTORY:  Family History   Problem Relation Age of Onset   Sindy Aden Cancer Mother     Depression Mother     Breast cancer Mother     Depression Sister     Diabetes Sister     Thyroid disease Sister         Disorder    Cancer Brother     Cancer Son    Sindy Aden Kidney cancer Son     Heart attack Maternal Grandmother     Liver cancer Maternal Grandmother     Stroke Maternal Grandfather     Cancer Family     Hypertension Family         Essential     Cancer Other     Thyroid disease Daughter         Disorder       SOCIAL HISTORY:  Social History     Tobacco Use    Smoking status: Never Smoker    Smokeless tobacco: Never Used   Substance Use Topics    Alcohol use: Never     Frequency: Never     Binge frequency: Never    Drug use: No       MEDICATIONS:    Current Outpatient Medications:     ALPRAZolam (XANAX) 0 5 mg tablet, Take a half or 1 tablet twice a day as needed for anxiety  , Disp: 30 tablet, Rfl: 1    ergocalciferol (VITAMIN D2) 50,000 units, TAKE 1 CAPSULE (50,000 UNITS TOTAL) BY MOUTH EVERY 28 DAYS, Disp: 3 capsule, Rfl: 0    fluticasone (FLONASE) 50 mcg/act nasal spray, 2 sprays into each nostril daily, Disp: 48 mL, Rfl: 1    hydrocortisone 2 5 % cream, Apply topically 2 (two) times a day For 1 week only every month as needed, Disp: 30 g, Rfl: 0    levothyroxine 75 mcg tablet, Take 1 tablet (75 mcg total) by mouth daily, Disp: 90 tablet, Rfl: 1    naproxen (NAPROSYN) 500 mg tablet, One tablet twice a day with meals as needed for pain and the trigger finger, Disp: 60 tablet, Rfl: 5    PREMARIN vaginal cream, , Disp: , Rfl:     senna-docusate sodium (SENOKOT-S) 8 6-50 mg per tablet, Take 1 tablet by mouth daily (Patient not taking: Reported on 5/27/2020), Disp: 7 tablet, Rfl: 0    ALLERGIES:  No Known Allergies    REVIEW OF SYSTEMS:  Pertinent items are noted in HPI  A comprehensive review of systems was negative      LABS:  HgA1c: No results found for: HGBA1C  BMP:   Lab Results   Component Value Date    GLUCOSE 91 09/23/2015    CALCIUM 9 3 05/29/2020     09/23/2015    K 4 2 05/29/2020    CO2 29 05/29/2020     05/29/2020    BUN 21 05/29/2020    CREATININE 0 73 05/29/2020 _____________________________________________________  PHYSICAL EXAMINATION:  Vital signs: Ht 4' 11" (1 499 m)   BMI 25 65 kg/m²   General: well developed and well nourished, alert, oriented times 3 and appears comfortable  Psychiatric: Normal  HEENT: Trachea Midline, No torticollis  Cardiovascular: No discernable arrhythmia  Pulmonary: No wheezing or stridor  Skin: No masses, erythema, lacerations, fluctation, ulcerations  Neurovascular: Sensation Intact to the Median, Ulnar, Radial Nerve, Motor Intact to the Median, Ulnar, Radial Nerve and Pulses Intact    MUSCULOSKELETAL EXAMINATION:  RIGHT SIDE:  Finger:  Tenderness A1 pulley (mild), Palpable clicking long finger, Crepitation long finger and Nodules  long finger    _____________________________________________________  STUDIES REVIEWED:  No Studies to review      PROCEDURES PERFORMED:  Hand/upper extremity injection: R long A1  Date/Time: 7/8/2020 2:05 PM  Consent given by: patient  Site marked: site marked  Supporting Documentation  Indications: pain   Procedure Details  Condition:trigger finger Location: long finger - R long A1   Needle size: 25 G  Ultrasound guidance: no  Approach: volar  Medications administered: 1 mL lidocaine 1 %; 6 mg betamethasone acetate-betamethasone sodium phosphate 6 (3-3) mg/mL    Patient tolerance: patient tolerated the procedure well with no immediate complications  Dressing:  Sterile dressing applied             Scribe Attestation    I,:   Gypsy Gotti PA-C am acting as a scribe while in the presence of the attending physician :        I,:   Arabella Victor MD personally performed the services described in this documentation    as scribed in my presence :

## 2020-07-18 ENCOUNTER — HOSPITAL ENCOUNTER (OUTPATIENT)
Dept: MAMMOGRAPHY | Facility: CLINIC | Age: 65
Discharge: HOME | End: 2020-07-18
Payer: COMMERCIAL

## 2020-07-18 DIAGNOSIS — Z12.31 ENCOUNTER FOR SCREENING MAMMOGRAM FOR MALIGNANT NEOPLASM OF BREAST: ICD-10-CM

## 2020-07-18 DIAGNOSIS — Z12.31 VISIT FOR SCREENING MAMMOGRAM: ICD-10-CM

## 2020-07-18 PROCEDURE — 77063 BREAST TOMOSYNTHESIS BI: CPT

## 2020-07-18 PROCEDURE — 77067 SCR MAMMO BI INCL CAD: CPT

## 2020-10-03 DIAGNOSIS — E55.9 VITAMIN D DEFICIENCY: ICD-10-CM

## 2020-10-03 RX ORDER — ERGOCALCIFEROL 1.25 MG/1
CAPSULE ORAL
Qty: 3 CAPSULE | Refills: 0 | Status: SHIPPED | OUTPATIENT
Start: 2020-10-03 | End: 2020-12-03 | Stop reason: SDUPTHER

## 2020-11-09 NOTE — NURSING NOTE
Patient discharged to home, IV removed  Discharge instructions given to patient with stated understanding  Patient demonstrated CLIVE drainage appropriately  Patient left unit in stable condition via w/c with belongings accompanied by RN and spouse  yes

## 2020-11-13 ENCOUNTER — TRANSCRIBE ORDERS (OUTPATIENT)
Dept: LAB | Facility: HOSPITAL | Age: 65
End: 2020-11-13

## 2020-11-16 DIAGNOSIS — E03.9 HYPOTHYROIDISM, UNSPECIFIED TYPE: ICD-10-CM

## 2020-11-16 DIAGNOSIS — E03.4 HYPOTHYROIDISM DUE TO ACQUIRED ATROPHY OF THYROID: Primary | ICD-10-CM

## 2020-11-17 ENCOUNTER — APPOINTMENT (OUTPATIENT)
Dept: LAB | Facility: HOSPITAL | Age: 65
End: 2020-11-17
Payer: COMMERCIAL

## 2020-11-17 DIAGNOSIS — E03.9 HYPOTHYROIDISM, UNSPECIFIED TYPE: ICD-10-CM

## 2020-11-17 DIAGNOSIS — E03.4 HYPOTHYROIDISM DUE TO ACQUIRED ATROPHY OF THYROID: ICD-10-CM

## 2020-11-17 LAB
T4 FREE SERPL-MCNC: 1.34 NG/DL (ref 0.76–1.46)
TSH SERPL DL<=0.05 MIU/L-ACNC: 1.09 UIU/ML (ref 0.47–4.68)

## 2020-11-17 PROCEDURE — 84439 ASSAY OF FREE THYROXINE: CPT

## 2020-11-17 PROCEDURE — 84443 ASSAY THYROID STIM HORMONE: CPT

## 2020-11-17 PROCEDURE — 36415 COLL VENOUS BLD VENIPUNCTURE: CPT

## 2020-11-23 ENCOUNTER — TELEPHONE (OUTPATIENT)
Dept: INTERNAL MEDICINE CLINIC | Facility: CLINIC | Age: 65
End: 2020-11-23

## 2020-11-23 DIAGNOSIS — Z20.822 EXPOSURE TO COVID-19 VIRUS: ICD-10-CM

## 2020-11-23 DIAGNOSIS — Z20.822 EXPOSURE TO COVID-19 VIRUS: Primary | ICD-10-CM

## 2020-11-23 PROCEDURE — U0003 INFECTIOUS AGENT DETECTION BY NUCLEIC ACID (DNA OR RNA); SEVERE ACUTE RESPIRATORY SYNDROME CORONAVIRUS 2 (SARS-COV-2) (CORONAVIRUS DISEASE [COVID-19]), AMPLIFIED PROBE TECHNIQUE, MAKING USE OF HIGH THROUGHPUT TECHNOLOGIES AS DESCRIBED BY CMS-2020-01-R: HCPCS | Performed by: INTERNAL MEDICINE

## 2020-11-24 ENCOUNTER — TELEPHONE (OUTPATIENT)
Dept: INTERNAL MEDICINE CLINIC | Facility: CLINIC | Age: 65
End: 2020-11-24

## 2020-11-24 LAB — SARS-COV-2 RNA SPEC QL NAA+PROBE: NOT DETECTED

## 2020-12-03 ENCOUNTER — TELEMEDICINE (OUTPATIENT)
Dept: INTERNAL MEDICINE CLINIC | Facility: CLINIC | Age: 65
End: 2020-12-03
Payer: COMMERCIAL

## 2020-12-03 DIAGNOSIS — E88.1 LIPODYSTROPHY: ICD-10-CM

## 2020-12-03 DIAGNOSIS — E55.9 VITAMIN D DEFICIENCY: ICD-10-CM

## 2020-12-03 DIAGNOSIS — F41.8 DEPRESSION WITH ANXIETY: ICD-10-CM

## 2020-12-03 DIAGNOSIS — E03.4 HYPOTHYROIDISM DUE TO ACQUIRED ATROPHY OF THYROID: Primary | ICD-10-CM

## 2020-12-03 PROCEDURE — 99213 OFFICE O/P EST LOW 20 MIN: CPT | Performed by: INTERNAL MEDICINE

## 2020-12-03 PROCEDURE — 1036F TOBACCO NON-USER: CPT | Performed by: INTERNAL MEDICINE

## 2020-12-03 RX ORDER — ALPRAZOLAM 0.5 MG/1
TABLET ORAL
Qty: 30 TABLET | Refills: 1 | Status: SHIPPED | OUTPATIENT
Start: 2020-12-03 | End: 2021-01-25 | Stop reason: SDUPTHER

## 2020-12-03 RX ORDER — LEVOTHYROXINE SODIUM 0.07 MG/1
75 TABLET ORAL DAILY
Qty: 90 TABLET | Refills: 1 | Status: SHIPPED | OUTPATIENT
Start: 2020-12-03 | End: 2021-08-13

## 2020-12-03 RX ORDER — ERGOCALCIFEROL 1.25 MG/1
50000 CAPSULE ORAL
Qty: 6 CAPSULE | Refills: 0 | Status: SHIPPED | OUTPATIENT
Start: 2020-12-03 | End: 2021-01-19

## 2020-12-20 ENCOUNTER — NURSE TRIAGE (OUTPATIENT)
Dept: OTHER | Facility: OTHER | Age: 65
End: 2020-12-20

## 2020-12-21 ENCOUNTER — TELEMEDICINE (OUTPATIENT)
Dept: INTERNAL MEDICINE CLINIC | Facility: CLINIC | Age: 65
End: 2020-12-21
Payer: COMMERCIAL

## 2020-12-21 DIAGNOSIS — Z20.822 SUSPECTED COVID-19 VIRUS INFECTION: ICD-10-CM

## 2020-12-21 DIAGNOSIS — F41.8 DEPRESSION WITH ANXIETY: Primary | ICD-10-CM

## 2020-12-21 DIAGNOSIS — E03.4 HYPOTHYROIDISM DUE TO ACQUIRED ATROPHY OF THYROID: ICD-10-CM

## 2020-12-21 PROCEDURE — 99213 OFFICE O/P EST LOW 20 MIN: CPT | Performed by: INTERNAL MEDICINE

## 2020-12-21 PROCEDURE — 87637 SARSCOV2&INF A&B&RSV AMP PRB: CPT | Performed by: INTERNAL MEDICINE

## 2020-12-22 LAB
FLUAV RNA NPH QL NAA+PROBE: NOT DETECTED
FLUBV RNA NPH QL NAA+PROBE: NOT DETECTED
RSV RNA NPH QL NAA+PROBE: NOT DETECTED
SARS-COV-2 RNA NPH QL NAA+PROBE: DETECTED

## 2020-12-26 ENCOUNTER — APPOINTMENT (EMERGENCY)
Dept: CT IMAGING | Facility: HOSPITAL | Age: 65
End: 2020-12-26
Payer: COMMERCIAL

## 2020-12-26 ENCOUNTER — HOSPITAL ENCOUNTER (EMERGENCY)
Facility: HOSPITAL | Age: 65
Discharge: HOME | End: 2020-12-26
Attending: EMERGENCY MEDICINE | Admitting: EMERGENCY MEDICINE
Payer: COMMERCIAL

## 2020-12-26 VITALS
OXYGEN SATURATION: 98 % | RESPIRATION RATE: 18 BRPM | SYSTOLIC BLOOD PRESSURE: 104 MMHG | DIASTOLIC BLOOD PRESSURE: 57 MMHG | HEART RATE: 92 BPM | BODY MASS INDEX: 26.38 KG/M2 | WEIGHT: 130.6 LBS | TEMPERATURE: 100.3 F

## 2020-12-26 DIAGNOSIS — U07.1 PNEUMONIA DUE TO COVID-19 VIRUS: Primary | ICD-10-CM

## 2020-12-26 DIAGNOSIS — J12.82 PNEUMONIA DUE TO COVID-19 VIRUS: Primary | ICD-10-CM

## 2020-12-26 DIAGNOSIS — R11.2 NAUSEA AND VOMITING: ICD-10-CM

## 2020-12-26 LAB
ALBUMIN SERPL BCP-MCNC: 3.8 G/DL (ref 3–5.2)
ALP SERPL-CCNC: 101 U/L (ref 43–122)
ALT SERPL W P-5'-P-CCNC: 39 U/L (ref 9–52)
ANION GAP SERPL CALCULATED.3IONS-SCNC: 5 MMOL/L (ref 5–14)
AST SERPL W P-5'-P-CCNC: 65 U/L (ref 14–36)
BACTERIA UR QL AUTO: NORMAL /HPF
BASOPHILS # BLD AUTO: 0 THOUSANDS/ΜL (ref 0–0.1)
BASOPHILS NFR BLD AUTO: 0 % (ref 0–1)
BILIRUB SERPL-MCNC: 0.6 MG/DL
BILIRUB UR QL STRIP: NEGATIVE
BUN SERPL-MCNC: 14 MG/DL (ref 5–25)
CALCIUM SERPL-MCNC: 8.6 MG/DL (ref 8.4–10.2)
CHLORIDE SERPL-SCNC: 99 MMOL/L (ref 97–108)
CLARITY UR: CLEAR
CO2 SERPL-SCNC: 34 MMOL/L (ref 22–30)
COLOR UR: YELLOW
CREAT SERPL-MCNC: 0.87 MG/DL (ref 0.6–1.2)
CRP SERPL QL: 27.6 MG/L
D DIMER PPP FEU-MCNC: 2.05 UG/ML FEU
EOSINOPHIL # BLD AUTO: 0 THOUSAND/ΜL (ref 0–0.4)
EOSINOPHIL NFR BLD AUTO: 0 % (ref 0–6)
ERYTHROCYTE [DISTWIDTH] IN BLOOD BY AUTOMATED COUNT: 14.5 %
GFR SERPL CREATININE-BSD FRML MDRD: 71 ML/MIN/1.73SQ M
GLUCOSE SERPL-MCNC: 109 MG/DL (ref 70–99)
GLUCOSE UR STRIP-MCNC: NEGATIVE MG/DL
HCT VFR BLD AUTO: 37.3 % (ref 36–46)
HGB BLD-MCNC: 12 G/DL (ref 12–16)
HGB UR QL STRIP.AUTO: 25
KETONES UR STRIP-MCNC: NEGATIVE MG/DL
LACTATE SERPL-SCNC: 1.3 MMOL/L (ref 0.7–2)
LEUKOCYTE ESTERASE UR QL STRIP: NEGATIVE
LIPASE SERPL-CCNC: 278 U/L (ref 23–300)
LYMPHOCYTES # BLD AUTO: 1.3 THOUSANDS/ΜL (ref 0.5–4)
LYMPHOCYTES NFR BLD AUTO: 22 % (ref 25–45)
MCH RBC QN AUTO: 25.8 PG (ref 26–34)
MCHC RBC AUTO-ENTMCNC: 32.2 G/DL (ref 31–36)
MCV RBC AUTO: 80 FL (ref 80–100)
MONOCYTES # BLD AUTO: 0.6 THOUSAND/ΜL (ref 0.2–0.9)
MONOCYTES NFR BLD AUTO: 10 % (ref 1–10)
NEUTROPHILS # BLD AUTO: 4.1 THOUSANDS/ΜL (ref 1.8–7.8)
NEUTS SEG NFR BLD AUTO: 68 % (ref 45–65)
NITRITE UR QL STRIP: NEGATIVE
NON-SQ EPI CELLS URNS QL MICRO: NORMAL /HPF
PH UR STRIP.AUTO: 7 [PH]
PLATELET # BLD AUTO: 199 THOUSANDS/UL (ref 150–450)
PMV BLD AUTO: 10.1 FL (ref 8.9–12.7)
POTASSIUM SERPL-SCNC: 3.9 MMOL/L (ref 3.6–5)
PROCALCITONIN SERPL-MCNC: <0.05 NG/ML
PROT SERPL-MCNC: 7.5 G/DL (ref 5.9–8.4)
PROT UR STRIP-MCNC: ABNORMAL MG/DL
RBC # BLD AUTO: 4.67 MILLION/UL (ref 4–5.2)
RBC #/AREA URNS AUTO: NORMAL /HPF
SODIUM SERPL-SCNC: 138 MMOL/L (ref 137–147)
SP GR UR STRIP.AUTO: 1.01 (ref 1–1.04)
TROPONIN I SERPL-MCNC: <0.01 NG/ML (ref 0–0.03)
UROBILINOGEN UA: NEGATIVE MG/DL
WBC # BLD AUTO: 6 THOUSAND/UL (ref 4.5–11)
WBC #/AREA URNS AUTO: NORMAL /HPF

## 2020-12-26 PROCEDURE — 99285 EMERGENCY DEPT VISIT HI MDM: CPT | Performed by: PHYSICIAN ASSISTANT

## 2020-12-26 PROCEDURE — 36415 COLL VENOUS BLD VENIPUNCTURE: CPT | Performed by: PHYSICIAN ASSISTANT

## 2020-12-26 PROCEDURE — 81001 URINALYSIS AUTO W/SCOPE: CPT | Performed by: PHYSICIAN ASSISTANT

## 2020-12-26 PROCEDURE — 71275 CT ANGIOGRAPHY CHEST: CPT

## 2020-12-26 PROCEDURE — 99284 EMERGENCY DEPT VISIT MOD MDM: CPT

## 2020-12-26 PROCEDURE — 93005 ELECTROCARDIOGRAM TRACING: CPT

## 2020-12-26 PROCEDURE — 96374 THER/PROPH/DIAG INJ IV PUSH: CPT

## 2020-12-26 PROCEDURE — 83690 ASSAY OF LIPASE: CPT | Performed by: PHYSICIAN ASSISTANT

## 2020-12-26 PROCEDURE — 87040 BLOOD CULTURE FOR BACTERIA: CPT | Performed by: PHYSICIAN ASSISTANT

## 2020-12-26 PROCEDURE — 86140 C-REACTIVE PROTEIN: CPT | Performed by: PHYSICIAN ASSISTANT

## 2020-12-26 PROCEDURE — 96361 HYDRATE IV INFUSION ADD-ON: CPT

## 2020-12-26 PROCEDURE — 84484 ASSAY OF TROPONIN QUANT: CPT | Performed by: PHYSICIAN ASSISTANT

## 2020-12-26 PROCEDURE — 85379 FIBRIN DEGRADATION QUANT: CPT | Performed by: PHYSICIAN ASSISTANT

## 2020-12-26 PROCEDURE — 83605 ASSAY OF LACTIC ACID: CPT | Performed by: PHYSICIAN ASSISTANT

## 2020-12-26 PROCEDURE — 84145 PROCALCITONIN (PCT): CPT | Performed by: PHYSICIAN ASSISTANT

## 2020-12-26 PROCEDURE — 85025 COMPLETE CBC W/AUTO DIFF WBC: CPT | Performed by: PHYSICIAN ASSISTANT

## 2020-12-26 PROCEDURE — G1004 CDSM NDSC: HCPCS

## 2020-12-26 PROCEDURE — 80053 COMPREHEN METABOLIC PANEL: CPT | Performed by: PHYSICIAN ASSISTANT

## 2020-12-26 RX ORDER — ACETAMINOPHEN 500 MG
500 TABLET ORAL EVERY 6 HOURS PRN
Qty: 30 TABLET | Refills: 0 | Status: SHIPPED | OUTPATIENT
Start: 2020-12-26

## 2020-12-26 RX ORDER — ONDANSETRON 2 MG/ML
4 INJECTION INTRAMUSCULAR; INTRAVENOUS ONCE
Status: COMPLETED | OUTPATIENT
Start: 2020-12-26 | End: 2020-12-26

## 2020-12-26 RX ORDER — MULTIVITAMIN
1 CAPSULE ORAL DAILY
Qty: 7 CAPSULE | Refills: 0 | Status: SHIPPED | OUTPATIENT
Start: 2020-12-26 | End: 2021-03-10 | Stop reason: ALTCHOICE

## 2020-12-26 RX ORDER — ONDANSETRON 4 MG/1
4 TABLET, ORALLY DISINTEGRATING ORAL EVERY 8 HOURS PRN
Qty: 20 TABLET | Refills: 0 | Status: SHIPPED | OUTPATIENT
Start: 2020-12-26 | End: 2021-03-10

## 2020-12-26 RX ORDER — MULTIVIT WITH MINERALS/LUTEIN
1000 TABLET ORAL DAILY
Qty: 7 TABLET | Refills: 0 | Status: SHIPPED | OUTPATIENT
Start: 2020-12-26 | End: 2021-11-11

## 2020-12-26 RX ORDER — IBUPROFEN 400 MG/1
400 TABLET ORAL EVERY 6 HOURS PRN
Qty: 20 TABLET | Refills: 0 | Status: SHIPPED | OUTPATIENT
Start: 2020-12-26 | End: 2021-01-02 | Stop reason: HOSPADM

## 2020-12-26 RX ORDER — ALBUTEROL SULFATE 90 UG/1
2 AEROSOL, METERED RESPIRATORY (INHALATION) EVERY 4 HOURS PRN
Qty: 1 INHALER | Refills: 0 | Status: SHIPPED | OUTPATIENT
Start: 2020-12-26

## 2020-12-26 RX ORDER — ACETAMINOPHEN 325 MG/1
650 TABLET ORAL ONCE
Status: COMPLETED | OUTPATIENT
Start: 2020-12-26 | End: 2020-12-26

## 2020-12-26 RX ORDER — ALBUTEROL SULFATE 90 UG/1
2 AEROSOL, METERED RESPIRATORY (INHALATION) ONCE
Status: COMPLETED | OUTPATIENT
Start: 2020-12-26 | End: 2020-12-26

## 2020-12-26 RX ORDER — GUAIFENESIN/DEXTROMETHORPHAN 100-10MG/5
10 SYRUP ORAL ONCE
Status: COMPLETED | OUTPATIENT
Start: 2020-12-26 | End: 2020-12-26

## 2020-12-26 RX ORDER — MELATONIN
2000 DAILY
Qty: 7 TABLET | Refills: 0 | Status: SHIPPED | OUTPATIENT
Start: 2020-12-26 | End: 2021-03-10

## 2020-12-26 RX ORDER — GUAIFENESIN/DEXTROMETHORPHAN 100-10MG/5
5 SYRUP ORAL 3 TIMES DAILY PRN
Qty: 118 ML | Refills: 0 | Status: SHIPPED | OUTPATIENT
Start: 2020-12-26 | End: 2021-01-05

## 2020-12-26 RX ORDER — FLUTICASONE PROPIONATE 50 MCG
1 SPRAY, SUSPENSION (ML) NASAL DAILY
Qty: 16 G | Refills: 0 | Status: SHIPPED | OUTPATIENT
Start: 2020-12-26 | End: 2021-01-02 | Stop reason: HOSPADM

## 2020-12-26 RX ORDER — BISMUTH SUBSALICYLATE 262 MG/1
524 TABLET, CHEWABLE ORAL
Qty: 30 TABLET | Refills: 0 | Status: SHIPPED | OUTPATIENT
Start: 2020-12-26 | End: 2021-03-10 | Stop reason: ALTCHOICE

## 2020-12-26 RX ADMIN — ONDANSETRON 4 MG: 2 INJECTION INTRAMUSCULAR; INTRAVENOUS at 11:11

## 2020-12-26 RX ADMIN — ALBUTEROL SULFATE 2 PUFF: 90 AEROSOL, METERED RESPIRATORY (INHALATION) at 12:11

## 2020-12-26 RX ADMIN — GUAIFENESIN AND DEXTROMETHORPHAN 10 ML: 100; 10 SYRUP ORAL at 12:11

## 2020-12-26 RX ADMIN — ACETAMINOPHEN 650 MG: 325 TABLET ORAL at 11:54

## 2020-12-26 RX ADMIN — SODIUM CHLORIDE 1000 ML: 0.9 INJECTION, SOLUTION INTRAVENOUS at 11:10

## 2020-12-26 RX ADMIN — IOHEXOL 100 ML: 350 INJECTION, SOLUTION INTRAVENOUS at 12:06

## 2020-12-27 ENCOUNTER — HOSPITAL ENCOUNTER (EMERGENCY)
Facility: HOSPITAL | Age: 65
Discharge: HOME | End: 2020-12-27
Attending: EMERGENCY MEDICINE
Payer: COMMERCIAL

## 2020-12-27 VITALS
RESPIRATION RATE: 20 BRPM | HEART RATE: 85 BPM | SYSTOLIC BLOOD PRESSURE: 133 MMHG | TEMPERATURE: 101.6 F | DIASTOLIC BLOOD PRESSURE: 63 MMHG | OXYGEN SATURATION: 96 %

## 2020-12-27 DIAGNOSIS — R19.7 DIARRHEA: ICD-10-CM

## 2020-12-27 DIAGNOSIS — R11.2 NAUSEA AND VOMITING: ICD-10-CM

## 2020-12-27 DIAGNOSIS — U07.1 COVID-19: Primary | ICD-10-CM

## 2020-12-27 LAB
ALBUMIN SERPL BCP-MCNC: 2.7 G/DL (ref 3.5–5)
ALP SERPL-CCNC: 83 U/L (ref 46–116)
ALT SERPL W P-5'-P-CCNC: 48 U/L (ref 12–78)
ANION GAP SERPL CALCULATED.3IONS-SCNC: 5 MMOL/L (ref 4–13)
AST SERPL W P-5'-P-CCNC: 61 U/L (ref 5–45)
ATRIAL RATE: 92 BPM
BASOPHILS # BLD AUTO: 0.01 THOUSANDS/ΜL (ref 0–0.1)
BASOPHILS NFR BLD AUTO: 0 % (ref 0–1)
BILIRUB SERPL-MCNC: 0.27 MG/DL (ref 0.2–1)
BUN SERPL-MCNC: 13 MG/DL (ref 5–25)
CALCIUM ALBUM COR SERPL-MCNC: 9.3 MG/DL (ref 8.3–10.1)
CALCIUM SERPL-MCNC: 8.3 MG/DL (ref 8.3–10.1)
CHLORIDE SERPL-SCNC: 105 MMOL/L (ref 100–108)
CO2 SERPL-SCNC: 28 MMOL/L (ref 21–32)
CREAT SERPL-MCNC: 0.92 MG/DL (ref 0.6–1.3)
EOSINOPHIL # BLD AUTO: 0 THOUSAND/ΜL (ref 0–0.61)
EOSINOPHIL NFR BLD AUTO: 0 % (ref 0–6)
ERYTHROCYTE [DISTWIDTH] IN BLOOD BY AUTOMATED COUNT: 14.2 % (ref 11.6–15.1)
GFR SERPL CREATININE-BSD FRML MDRD: 66 ML/MIN/1.73SQ M
GLUCOSE SERPL-MCNC: 99 MG/DL (ref 65–140)
HCT VFR BLD AUTO: 33 % (ref 34.8–46.1)
HGB BLD-MCNC: 10.4 G/DL (ref 11.5–15.4)
IMM GRANULOCYTES # BLD AUTO: 0.04 THOUSAND/UL (ref 0–0.2)
IMM GRANULOCYTES NFR BLD AUTO: 1 % (ref 0–2)
LYMPHOCYTES # BLD AUTO: 1.34 THOUSANDS/ΜL (ref 0.6–4.47)
LYMPHOCYTES NFR BLD AUTO: 19 % (ref 14–44)
MCH RBC QN AUTO: 26 PG (ref 26.8–34.3)
MCHC RBC AUTO-ENTMCNC: 31.5 G/DL (ref 31.4–37.4)
MCV RBC AUTO: 83 FL (ref 82–98)
MONOCYTES # BLD AUTO: 0.61 THOUSAND/ΜL (ref 0.17–1.22)
MONOCYTES NFR BLD AUTO: 9 % (ref 4–12)
NEUTROPHILS # BLD AUTO: 4.93 THOUSANDS/ΜL (ref 1.85–7.62)
NEUTS SEG NFR BLD AUTO: 71 % (ref 43–75)
NRBC BLD AUTO-RTO: 0 /100 WBCS
P AXIS: 63 DEGREES
PLATELET # BLD AUTO: 236 THOUSANDS/UL (ref 149–390)
PMV BLD AUTO: 11.4 FL (ref 8.9–12.7)
POTASSIUM SERPL-SCNC: 4.1 MMOL/L (ref 3.5–5.3)
PR INTERVAL: 148 MS
PROT SERPL-MCNC: 6.7 G/DL (ref 6.4–8.2)
QRS AXIS: -13 DEGREES
QRSD INTERVAL: 120 MS
QT INTERVAL: 370 MS
QTC INTERVAL: 457 MS
RBC # BLD AUTO: 4 MILLION/UL (ref 3.81–5.12)
SODIUM SERPL-SCNC: 138 MMOL/L (ref 136–145)
T WAVE AXIS: 79 DEGREES
VENTRICULAR RATE: 92 BPM
WBC # BLD AUTO: 6.93 THOUSAND/UL (ref 4.31–10.16)

## 2020-12-27 PROCEDURE — 85025 COMPLETE CBC W/AUTO DIFF WBC: CPT | Performed by: EMERGENCY MEDICINE

## 2020-12-27 PROCEDURE — 99284 EMERGENCY DEPT VISIT MOD MDM: CPT | Performed by: EMERGENCY MEDICINE

## 2020-12-27 PROCEDURE — 96361 HYDRATE IV INFUSION ADD-ON: CPT

## 2020-12-27 PROCEDURE — 93010 ELECTROCARDIOGRAM REPORT: CPT | Performed by: INTERNAL MEDICINE

## 2020-12-27 PROCEDURE — 99283 EMERGENCY DEPT VISIT LOW MDM: CPT

## 2020-12-27 PROCEDURE — 96375 TX/PRO/DX INJ NEW DRUG ADDON: CPT

## 2020-12-27 PROCEDURE — 96374 THER/PROPH/DIAG INJ IV PUSH: CPT

## 2020-12-27 PROCEDURE — 80053 COMPREHEN METABOLIC PANEL: CPT | Performed by: EMERGENCY MEDICINE

## 2020-12-27 PROCEDURE — 36415 COLL VENOUS BLD VENIPUNCTURE: CPT | Performed by: EMERGENCY MEDICINE

## 2020-12-27 RX ORDER — KETOROLAC TROMETHAMINE 30 MG/ML
15 INJECTION, SOLUTION INTRAMUSCULAR; INTRAVENOUS ONCE
Status: COMPLETED | OUTPATIENT
Start: 2020-12-27 | End: 2020-12-27

## 2020-12-27 RX ORDER — ONDANSETRON 2 MG/ML
4 INJECTION INTRAMUSCULAR; INTRAVENOUS ONCE
Status: COMPLETED | OUTPATIENT
Start: 2020-12-27 | End: 2020-12-27

## 2020-12-27 RX ADMIN — KETOROLAC TROMETHAMINE 15 MG: 30 INJECTION, SOLUTION INTRAMUSCULAR at 18:08

## 2020-12-27 RX ADMIN — ONDANSETRON 4 MG: 2 INJECTION INTRAMUSCULAR; INTRAVENOUS at 18:08

## 2020-12-27 RX ADMIN — SODIUM CHLORIDE 1000 ML: 0.9 INJECTION, SOLUTION INTRAVENOUS at 18:09

## 2020-12-28 ENCOUNTER — TELEMEDICINE (OUTPATIENT)
Dept: INTERNAL MEDICINE CLINIC | Facility: CLINIC | Age: 65
End: 2020-12-28
Payer: COMMERCIAL

## 2020-12-28 DIAGNOSIS — I45.4: ICD-10-CM

## 2020-12-28 DIAGNOSIS — E03.4 HYPOTHYROIDISM DUE TO ACQUIRED ATROPHY OF THYROID: ICD-10-CM

## 2020-12-28 DIAGNOSIS — F41.8 DEPRESSION WITH ANXIETY: ICD-10-CM

## 2020-12-28 DIAGNOSIS — Z20.822 SUSPECTED COVID-19 VIRUS INFECTION: Primary | ICD-10-CM

## 2020-12-28 PROCEDURE — 1036F TOBACCO NON-USER: CPT | Performed by: INTERNAL MEDICINE

## 2020-12-28 PROCEDURE — 99213 OFFICE O/P EST LOW 20 MIN: CPT | Performed by: INTERNAL MEDICINE

## 2020-12-29 ENCOUNTER — TELEPHONE (OUTPATIENT)
Dept: INTERNAL MEDICINE CLINIC | Facility: CLINIC | Age: 65
End: 2020-12-29

## 2020-12-29 ENCOUNTER — APPOINTMENT (EMERGENCY)
Dept: RADIOLOGY | Facility: HOSPITAL | Age: 65
DRG: 177 | End: 2020-12-29
Payer: COMMERCIAL

## 2020-12-29 ENCOUNTER — HOSPITAL ENCOUNTER (INPATIENT)
Facility: HOSPITAL | Age: 65
LOS: 4 days | Discharge: HOME | DRG: 177 | End: 2021-01-02
Attending: EMERGENCY MEDICINE | Admitting: INTERNAL MEDICINE
Payer: COMMERCIAL

## 2020-12-29 DIAGNOSIS — D64.9 ANEMIA: ICD-10-CM

## 2020-12-29 DIAGNOSIS — R06.00 DYSPNEA: ICD-10-CM

## 2020-12-29 DIAGNOSIS — U07.1 PNEUMONIA DUE TO COVID-19 VIRUS: Primary | ICD-10-CM

## 2020-12-29 DIAGNOSIS — J12.82 PNEUMONIA DUE TO COVID-19 VIRUS: Primary | ICD-10-CM

## 2020-12-29 DIAGNOSIS — D50.9 IRON DEFICIENCY ANEMIA, UNSPECIFIED IRON DEFICIENCY ANEMIA TYPE: ICD-10-CM

## 2020-12-29 PROBLEM — J96.01 ACUTE RESPIRATORY FAILURE WITH HYPOXIA (HCC): Status: ACTIVE | Noted: 2020-12-29

## 2020-12-29 PROBLEM — R74.8 ELEVATED CK: Status: ACTIVE | Noted: 2020-12-29

## 2020-12-29 PROBLEM — E78.5 HYPERLIPIDEMIA: Status: ACTIVE | Noted: 2017-03-28

## 2020-12-29 LAB
ABO GROUP BLD: NORMAL
ALBUMIN SERPL BCP-MCNC: 3.2 G/DL (ref 3–5.2)
ALP SERPL-CCNC: 59 U/L (ref 43–122)
ALT SERPL W P-5'-P-CCNC: 71 U/L (ref 9–52)
ANION GAP SERPL CALCULATED.3IONS-SCNC: 5 MMOL/L (ref 5–14)
AST SERPL W P-5'-P-CCNC: 126 U/L (ref 14–36)
ATRIAL RATE: 86 BPM
BASOPHILS # BLD AUTO: 0 THOUSANDS/ΜL (ref 0–0.1)
BASOPHILS NFR BLD AUTO: 0 % (ref 0–1)
BILIRUB SERPL-MCNC: 0.7 MG/DL
BUN SERPL-MCNC: 12 MG/DL (ref 5–25)
CALCIUM ALBUM COR SERPL-MCNC: 8.5 MG/DL (ref 8.3–10.1)
CALCIUM SERPL-MCNC: 7.9 MG/DL (ref 8.4–10.2)
CHLORIDE SERPL-SCNC: 101 MMOL/L (ref 97–108)
CK MB SERPL-MCNC: 0.8 NG/ML (ref 0–2.4)
CK MB SERPL-MCNC: <1 % (ref 0–2.5)
CK SERPL-CCNC: 408 U/L (ref 30–135)
CO2 SERPL-SCNC: 31 MMOL/L (ref 22–30)
CREAT SERPL-MCNC: 0.89 MG/DL (ref 0.6–1.2)
CRP SERPL QL: 143.7 MG/L
D DIMER PPP FEU-MCNC: 2.6 UG/ML FEU
EOSINOPHIL # BLD AUTO: 0 THOUSAND/ΜL (ref 0–0.4)
EOSINOPHIL NFR BLD AUTO: 0 % (ref 0–6)
ERYTHROCYTE [DISTWIDTH] IN BLOOD BY AUTOMATED COUNT: 14.2 %
GFR SERPL CREATININE-BSD FRML MDRD: 69 ML/MIN/1.73SQ M
GLUCOSE SERPL-MCNC: 118 MG/DL (ref 70–99)
HCT VFR BLD AUTO: 29.8 % (ref 36–46)
HGB BLD-MCNC: 9.8 G/DL (ref 12–16)
HYPERCHROMIA BLD QL SMEAR: PRESENT
LACTATE SERPL-SCNC: 0.8 MMOL/L (ref 0.7–2)
LIPASE SERPL-CCNC: 161 U/L (ref 23–300)
LYMPHOCYTES # BLD AUTO: 1.2 THOUSANDS/ΜL (ref 0.5–4)
LYMPHOCYTES NFR BLD AUTO: 12 % (ref 25–45)
MCH RBC QN AUTO: 25.9 PG (ref 26–34)
MCHC RBC AUTO-ENTMCNC: 32.8 G/DL (ref 31–36)
MCV RBC AUTO: 79 FL (ref 80–100)
MICROCYTES BLD QL AUTO: PRESENT
MONOCYTES # BLD AUTO: 0.8 THOUSAND/ΜL (ref 0.2–0.9)
MONOCYTES NFR BLD AUTO: 8 % (ref 1–10)
NEUTROPHILS # BLD AUTO: 8 THOUSANDS/ΜL (ref 1.8–7.8)
NEUTS SEG NFR BLD AUTO: 80 % (ref 45–65)
NT-PROBNP SERPL-MCNC: 420 PG/ML (ref 0–299)
P AXIS: 46 DEGREES
PLATELET # BLD AUTO: 257 THOUSANDS/UL (ref 150–450)
PLATELET BLD QL SMEAR: ADEQUATE
PMV BLD AUTO: 9.4 FL (ref 8.9–12.7)
POTASSIUM SERPL-SCNC: 3.7 MMOL/L (ref 3.6–5)
PR INTERVAL: 154 MS
PROT SERPL-MCNC: 6.5 G/DL (ref 5.9–8.4)
QRS AXIS: -22 DEGREES
QRSD INTERVAL: 124 MS
QT INTERVAL: 390 MS
QTC INTERVAL: 466 MS
RBC # BLD AUTO: 3.78 MILLION/UL (ref 4–5.2)
RBC MORPH BLD: NORMAL
RH BLD: POSITIVE
SODIUM SERPL-SCNC: 137 MMOL/L (ref 137–147)
T WAVE AXIS: 59 DEGREES
TROPONIN I SERPL-MCNC: <0.01 NG/ML (ref 0–0.03)
VENTRICULAR RATE: 86 BPM
WBC # BLD AUTO: 10.1 THOUSAND/UL (ref 4.5–11)

## 2020-12-29 PROCEDURE — 80053 COMPREHEN METABOLIC PANEL: CPT | Performed by: PHYSICIAN ASSISTANT

## 2020-12-29 PROCEDURE — 96374 THER/PROPH/DIAG INJ IV PUSH: CPT

## 2020-12-29 PROCEDURE — 36415 COLL VENOUS BLD VENIPUNCTURE: CPT | Performed by: PHYSICIAN ASSISTANT

## 2020-12-29 PROCEDURE — 87040 BLOOD CULTURE FOR BACTERIA: CPT | Performed by: PHYSICIAN ASSISTANT

## 2020-12-29 PROCEDURE — 82728 ASSAY OF FERRITIN: CPT | Performed by: PHYSICIAN ASSISTANT

## 2020-12-29 PROCEDURE — 82550 ASSAY OF CK (CPK): CPT | Performed by: PHYSICIAN ASSISTANT

## 2020-12-29 PROCEDURE — 99223 1ST HOSP IP/OBS HIGH 75: CPT | Performed by: PHYSICIAN ASSISTANT

## 2020-12-29 PROCEDURE — 96375 TX/PRO/DX INJ NEW DRUG ADDON: CPT

## 2020-12-29 PROCEDURE — 99285 EMERGENCY DEPT VISIT HI MDM: CPT

## 2020-12-29 PROCEDURE — 84145 PROCALCITONIN (PCT): CPT | Performed by: PHYSICIAN ASSISTANT

## 2020-12-29 PROCEDURE — 93010 ELECTROCARDIOGRAM REPORT: CPT | Performed by: INTERNAL MEDICINE

## 2020-12-29 PROCEDURE — 86901 BLOOD TYPING SEROLOGIC RH(D): CPT | Performed by: PHYSICIAN ASSISTANT

## 2020-12-29 PROCEDURE — 83880 ASSAY OF NATRIURETIC PEPTIDE: CPT | Performed by: PHYSICIAN ASSISTANT

## 2020-12-29 PROCEDURE — 71045 X-RAY EXAM CHEST 1 VIEW: CPT

## 2020-12-29 PROCEDURE — 99285 EMERGENCY DEPT VISIT HI MDM: CPT | Performed by: PHYSICIAN ASSISTANT

## 2020-12-29 PROCEDURE — 84484 ASSAY OF TROPONIN QUANT: CPT | Performed by: PHYSICIAN ASSISTANT

## 2020-12-29 PROCEDURE — 85379 FIBRIN DEGRADATION QUANT: CPT | Performed by: PHYSICIAN ASSISTANT

## 2020-12-29 PROCEDURE — 96361 HYDRATE IV INFUSION ADD-ON: CPT

## 2020-12-29 PROCEDURE — 85025 COMPLETE CBC W/AUTO DIFF WBC: CPT | Performed by: PHYSICIAN ASSISTANT

## 2020-12-29 PROCEDURE — 86900 BLOOD TYPING SEROLOGIC ABO: CPT | Performed by: PHYSICIAN ASSISTANT

## 2020-12-29 PROCEDURE — XW033E5 INTRODUCTION OF REMDESIVIR ANTI-INFECTIVE INTO PERIPHERAL VEIN, PERCUTANEOUS APPROACH, NEW TECHNOLOGY GROUP 5: ICD-10-PCS | Performed by: INTERNAL MEDICINE

## 2020-12-29 PROCEDURE — 83605 ASSAY OF LACTIC ACID: CPT | Performed by: PHYSICIAN ASSISTANT

## 2020-12-29 PROCEDURE — 93005 ELECTROCARDIOGRAM TRACING: CPT

## 2020-12-29 PROCEDURE — 86140 C-REACTIVE PROTEIN: CPT | Performed by: PHYSICIAN ASSISTANT

## 2020-12-29 PROCEDURE — 83690 ASSAY OF LIPASE: CPT | Performed by: PHYSICIAN ASSISTANT

## 2020-12-29 PROCEDURE — 82553 CREATINE MB FRACTION: CPT | Performed by: PHYSICIAN ASSISTANT

## 2020-12-29 RX ORDER — LORAZEPAM 2 MG/ML
1 INJECTION INTRAMUSCULAR ONCE
Status: COMPLETED | OUTPATIENT
Start: 2020-12-29 | End: 2020-12-29

## 2020-12-29 RX ORDER — CEFTRIAXONE 1 G/50ML
1000 INJECTION, SOLUTION INTRAVENOUS EVERY 24 HOURS
Status: DISCONTINUED | OUTPATIENT
Start: 2020-12-29 | End: 2020-12-31

## 2020-12-29 RX ORDER — DOXYCYCLINE HYCLATE 100 MG/1
100 CAPSULE ORAL EVERY 12 HOURS
Status: DISCONTINUED | OUTPATIENT
Start: 2020-12-29 | End: 2020-12-31

## 2020-12-29 RX ORDER — ZINC SULFATE 50(220)MG
220 CAPSULE ORAL DAILY
Status: DISCONTINUED | OUTPATIENT
Start: 2020-12-30 | End: 2021-01-02 | Stop reason: HOSPADM

## 2020-12-29 RX ORDER — ASCORBIC ACID 500 MG
1000 TABLET ORAL EVERY 12 HOURS SCHEDULED
Status: DISCONTINUED | OUTPATIENT
Start: 2020-12-29 | End: 2021-01-02 | Stop reason: HOSPADM

## 2020-12-29 RX ORDER — ACETAMINOPHEN 325 MG/1
650 TABLET ORAL EVERY 6 HOURS PRN
Status: DISCONTINUED | OUTPATIENT
Start: 2020-12-29 | End: 2021-01-02 | Stop reason: HOSPADM

## 2020-12-29 RX ORDER — ACETAMINOPHEN 325 MG/1
650 TABLET ORAL ONCE
Status: COMPLETED | OUTPATIENT
Start: 2020-12-29 | End: 2020-12-29

## 2020-12-29 RX ORDER — SODIUM CHLORIDE 9 MG/ML
125 INJECTION, SOLUTION INTRAVENOUS CONTINUOUS
Status: DISCONTINUED | OUTPATIENT
Start: 2020-12-29 | End: 2021-01-01

## 2020-12-29 RX ORDER — ONDANSETRON 2 MG/ML
4 INJECTION INTRAMUSCULAR; INTRAVENOUS ONCE
Status: COMPLETED | OUTPATIENT
Start: 2020-12-29 | End: 2020-12-29

## 2020-12-29 RX ORDER — HEPARIN SODIUM 5000 [USP'U]/ML
5000 INJECTION, SOLUTION INTRAVENOUS; SUBCUTANEOUS EVERY 8 HOURS SCHEDULED
Status: DISCONTINUED | OUTPATIENT
Start: 2020-12-29 | End: 2021-01-02 | Stop reason: HOSPADM

## 2020-12-29 RX ORDER — MELATONIN
2000 DAILY
Status: DISCONTINUED | OUTPATIENT
Start: 2020-12-30 | End: 2021-01-02 | Stop reason: HOSPADM

## 2020-12-29 RX ORDER — FAMOTIDINE 20 MG/1
20 TABLET, FILM COATED ORAL 2 TIMES DAILY
Status: DISCONTINUED | OUTPATIENT
Start: 2020-12-29 | End: 2021-01-02 | Stop reason: HOSPADM

## 2020-12-29 RX ORDER — ONDANSETRON 2 MG/ML
4 INJECTION INTRAMUSCULAR; INTRAVENOUS EVERY 6 HOURS PRN
Status: DISCONTINUED | OUTPATIENT
Start: 2020-12-29 | End: 2021-01-02 | Stop reason: HOSPADM

## 2020-12-29 RX ORDER — DEXAMETHASONE SODIUM PHOSPHATE 4 MG/ML
6 INJECTION, SOLUTION INTRA-ARTICULAR; INTRALESIONAL; INTRAMUSCULAR; INTRAVENOUS; SOFT TISSUE EVERY 24 HOURS
Status: DISCONTINUED | OUTPATIENT
Start: 2020-12-29 | End: 2021-01-02 | Stop reason: HOSPADM

## 2020-12-29 RX ADMIN — REMDESIVIR 200 MG: 100 INJECTION, POWDER, LYOPHILIZED, FOR SOLUTION INTRAVENOUS at 21:42

## 2020-12-29 RX ADMIN — SODIUM CHLORIDE 125 ML/HR: 0.9 INJECTION, SOLUTION INTRAVENOUS at 23:35

## 2020-12-29 RX ADMIN — FAMOTIDINE 20 MG: 20 TABLET ORAL at 20:57

## 2020-12-29 RX ADMIN — DOXYCYCLINE 100 MG: 100 CAPSULE ORAL at 20:56

## 2020-12-29 RX ADMIN — LORAZEPAM 2 MG: 2 INJECTION INTRAMUSCULAR; INTRAVENOUS at 18:35

## 2020-12-29 RX ADMIN — ONDANSETRON 4 MG: 2 INJECTION INTRAMUSCULAR; INTRAVENOUS at 18:34

## 2020-12-29 RX ADMIN — SODIUM CHLORIDE 1000 ML: 0.9 INJECTION, SOLUTION INTRAVENOUS at 18:36

## 2020-12-29 RX ADMIN — ACETAMINOPHEN 650 MG: 325 TABLET ORAL at 18:24

## 2020-12-29 RX ADMIN — HEPARIN SODIUM 5000 UNITS: 5000 INJECTION INTRAVENOUS; SUBCUTANEOUS at 21:12

## 2020-12-29 RX ADMIN — DEXAMETHASONE SODIUM PHOSPHATE 6 MG: 4 INJECTION, SOLUTION INTRA-ARTICULAR; INTRALESIONAL; INTRAMUSCULAR; INTRAVENOUS; SOFT TISSUE at 21:07

## 2020-12-29 RX ADMIN — OXYCODONE HYDROCHLORIDE AND ACETAMINOPHEN 1000 MG: 500 TABLET ORAL at 20:57

## 2020-12-29 RX ADMIN — CEFTRIAXONE 1000 MG: 1 INJECTION, SOLUTION INTRAVENOUS at 21:05

## 2020-12-29 NOTE — ED PROVIDER NOTES
History  Chief Complaint   Patient presents with    Shortness of Breath     pt c/o sob, weakness     52-year-old female with past medical history of hyperlipidemia, recently tested positive for COVID on 12/21 presenting for evaluation of shortness of breath and generalized weakness  Patient has been seen twice in the emergency department since diagnosis of [place]  She has had negative PE study but diagnosed with covid pneumonia during that visit  Patient completed telemedicine visit with her PCP today who advised them to come to the ED for evaluation due to worsening symptoms  Patient states she has had nausea at home that has not been relieved with Zofran  No episodes of vomiting or diarrhea  She reports continued shortness of breath and chest pain along with generalized body aches and weakness   is sick at home with covid as well  Prior to Admission Medications   Prescriptions Last Dose Informant Patient Reported? Taking? ALPRAZolam (XANAX) 0 5 mg tablet   No No   Sig: Take a half or 1 tablet twice a day as needed for anxiety     Ascorbic Acid (vitamin C) 1000 MG tablet   No No   Sig: Take 1 tablet (1,000 mg total) by mouth daily for 7 days   Multiple Vitamin (multivitamin) capsule   No No   Sig: Take 1 capsule by mouth daily   PREMARIN vaginal cream   Yes No   acetaminophen (TYLENOL) 500 mg tablet   No No   Sig: Take 1 tablet (500 mg total) by mouth every 6 (six) hours as needed for mild pain   albuterol (PROVENTIL HFA,VENTOLIN HFA) 90 mcg/act inhaler   No No   Sig: Inhale 2 puffs every 4 (four) hours as needed for wheezing   bismuth subsalicylate (PEPTO BISMOL) 262 MG chewable tablet   No No   Sig: Chew 2 tablets (524 mg total) 4 (four) times a day (before meals and at bedtime)   cholecalciferol (VITAMIN D3) 1,000 units tablet   No No   Sig: Take 2 tablets (2,000 Units total) by mouth daily   dextromethorphan-guaiFENesin (ROBITUSSIN DM)  mg/5 mL syrup   No No   Sig: Take 5 mL by mouth 3 (three) times a day as needed for cough or congestion for up to 10 days   ergocalciferol (VITAMIN D2) 50,000 units   No No   Sig: Take 1 capsule (50,000 Units total) by mouth every 28 days   fluticasone (FLONASE) 50 mcg/act nasal spray   No No   Si sprays into each nostril daily   fluticasone (FLONASE) 50 mcg/act nasal spray   No No   Si spray into each nostril daily   hydrocortisone 2 5 % cream   No No   Sig: Apply topically 2 (two) times a day For 1 week only every month as needed   ibuprofen (MOTRIN) 400 mg tablet   No No   Sig: Take 1 tablet (400 mg total) by mouth every 6 (six) hours as needed (pain)   levothyroxine 75 mcg tablet   No No   Sig: Take 1 tablet (75 mcg total) by mouth daily   menthol-cetylpyridinium (CEPACOL) 3 MG lozenge   No No   Sig: Take 1 lozenge (3 mg total) by mouth as needed for sore throat   naproxen (NAPROSYN) 500 mg tablet   No No   Sig: One tablet twice a day with meals as needed for pain and the trigger finger   ondansetron (ZOFRAN-ODT) 4 mg disintegrating tablet   No No   Sig: Take 1 tablet (4 mg total) by mouth every 8 (eight) hours as needed for nausea for up to 10 days   senna-docusate sodium (SENOKOT-S) 8 6-50 mg per tablet Not Taking at Unknown time  No No   Sig: Take 1 tablet by mouth daily   Patient not taking: Reported on 2020      Facility-Administered Medications: None       Past Medical History:   Diagnosis Date    Adhesive capsulitis of left shoulder     Last assessed - 2/16/15    Allergic rhinitis     Last assessed - 3/17/15    Anxiety     Back pain     Disease of thyroid gland     hypo    Hyperlipidemia     borderline , no meds    LBBB (left bundle branch block)     chronic    Migraine     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 for tuberculosis     Psychiatric disorder     Vitamin D deficiency        Past Surgical History:   Procedure Laterality Date    BREAST CYST EXCISION Bilateral     20yrs ago    COLONOSCOPY      HYSTERECTOMY      SC SKIN TISSUE PROCEDURE UNLISTED N/A 7/25/2019    Procedure: ABDOMINOPLASTY WITH LIPOSUCTION;  Surgeon: Buffy Kennedy MD;  Location: [address] [place] [address] OR;  Service: Plastics    SHOULDER ARTHROSCOPY      Last assessed - 12/23/14    SHOULDER ARTHROSCOPY W/ ROTATOR CUFF REPAIR      Last assessed - 4/4/16       Family History   Problem Relation Age of Onset    Cancer Mother     Depression Mother     Breast cancer Mother 76    Depression Sister     Diabetes Sister     Thyroid disease Sister         Disorder    Cancer Brother     Cancer Son     Kidney cancer Son     Heart attack Maternal Grandmother     Liver cancer Maternal Grandmother     Stroke Maternal Grandfather     Cancer Family     Hypertension Family         Essential     Cancer Other     Thyroid disease Daughter         Disorder    No Known Problems Paternal Grandmother     Breast cancer Sister 61    No Known Problems Sister     No Known Problems Sister     Breast cancer Maternal Aunt     No Known Problems Paternal Aunt     No Known Problems Paternal Aunt     No Known Problems Paternal Aunt      I have reviewed and agree with the history as documented  E-Cigarette/Vaping    E-Cigarette Use Never User      E-Cigarette/Vaping Substances     Social History     Tobacco Use    Smoking status: Never Smoker    Smokeless tobacco: Never Used   Substance Use Topics    Alcohol use: Never     Frequency: Never     Binge frequency: Never    Drug use: No       Review of Systems   All other systems reviewed and are negative  Physical Exam  Physical Exam  Vitals signs and nursing note reviewed  Constitutional:       General: She is not in acute distress  Appearance: She is well-developed  She is not ill-appearing, toxic-appearing or diaphoretic  Comments: Appears uncomfortable, thin   HENT:      Head: Normocephalic and atraumatic     Eyes:      Conjunctiva/sclera: Conjunctivae normal       Comments: EOM grossly intact   Neck:      Musculoskeletal: Normal range of motion and neck supple  Vascular: No JVD  Cardiovascular:      Rate and Rhythm: Normal rate  Pulmonary:      Effort: Tachypnea present  Breath sounds: No wheezing or rhonchi  Comments: Increased effort  Abdominal:      Palpations: Abdomen is soft  Musculoskeletal:      Comments: FROM, steady gait, cap refill brisk, strength and sensation grossly intact throughout   Skin:     General: Skin is warm and dry  Capillary Refill: Capillary refill takes less than 2 seconds  Neurological:      Mental Status: She is alert and oriented to person, place, and time     Psychiatric:         Behavior: Behavior normal          Vital Signs  ED Triage Vitals   Temperature Pulse Respirations Blood Pressure SpO2   12/29/20 1759 12/29/20 1759 12/29/20 1759 12/29/20 1759 12/29/20 1759   (!) 100 9 °F (38 3 °C) 88 16 114/63 93 %      Temp Source Heart Rate Source Patient Position - Orthostatic VS BP Location FiO2 (%)   12/29/20 1759 12/29/20 1759 12/29/20 1759 12/29/20 1759 --   Tympanic Monitor Sitting Left arm       Pain Score       12/29/20 1824       Worst Possible Pain           Vitals:    12/29/20 1759 12/29/20 1830   BP: 114/63 119/57   Pulse: 88 83   Patient Position - Orthostatic VS: Sitting Lying         Visual Acuity      ED Medications  Medications   cholecalciferol (VITAMIN D3) tablet 2,000 Units (has no administration in time range)   dexamethasone (DECADRON) injection 6 mg (has no administration in time range)   ascorbic acid (VITAMIN C) tablet 1,000 mg (has no administration in time range)   zinc sulfate (ZINCATE) capsule 220 mg (has no administration in time range)     Followed by   multivitamin-minerals (CENTRUM) tablet 1 tablet (has no administration in time range)   famotidine (PEPCID) tablet 20 mg (has no administration in time range)   heparin (porcine) subcutaneous injection 5,000 Units (has no administration in time range)   remdesivir (Veklury) 200 mg in sodium chloride 0 9 % 250 mL IVPB (has no administration in time range)     Followed by   remdesivir Stephanie Sox) 100 mg in sodium chloride 0 9 % 250 mL IVPB (has no administration in time range)   doxycycline hyclate (VIBRAMYCIN) capsule 100 mg (has no administration in time range)   cefTRIAXone (ROCEPHIN) IVPB (premix in dextrose) 1,000 mg 50 mL (has no administration in time range)   ondansetron (ZOFRAN) injection 4 mg (has no administration in time range)   acetaminophen (TYLENOL) tablet 650 mg (has no administration in time range)   ondansetron (ZOFRAN) injection 4 mg (4 mg Intravenous Given 12/29/20 1834)   sodium chloride 0 9 % bolus 1,000 mL (0 mL Intravenous Stopped 12/29/20 2007)   acetaminophen (TYLENOL) tablet 650 mg (650 mg Oral Given 12/29/20 1824)   LORazepam (ATIVAN) injection 1 mg (2 mg Intravenous Given 12/29/20 1835)       Diagnostic Studies  Results Reviewed     Procedure Component Value Units Date/Time    Lactic acid, plasma [[id number]] Collected: 12/29/20 2043    Lab Status: No result Specimen: Blood from Hand, Left     Procalcitonin with AM Reflex [[id number]] Collected: 12/29/20 2042    Lab Status: No result Specimen: Blood from Arm, Left     D-dimer, quantitative [[id number]] Collected: 12/29/20 2042    Lab Status: No result Specimen: Blood from Arm, Left     Blood culture [[id number]] Collected: 12/29/20 2041    Lab Status: No result Specimen: Blood from Arm, Left     Blood culture [[id number]] Collected: 12/29/20 2041    Lab Status: No result Specimen: Blood from Hand, Left     CKMB [[id number]]  (Normal) Collected: 12/29/20 1833    Lab Status: Final result Specimen: Blood from Arm, Left Updated: 12/29/20 2029     CK-MB Index <1 0 %      CK-MB 0 8 ng/mL     C-reactive protein [[id number]]  (Abnormal) Collected: 12/29/20 1833    Lab Status: Final result Specimen: Blood from Arm, Left Updated: 12/29/20 2024      7 mg/L     CK (with reflex to MB) [[id number]]  (Abnormal) Collected: 12/29/20 1833    Lab Status: Final result Specimen: Blood from Arm, Left Updated: 12/29/20 2024     Total  U/L     NT-BNP PRO [[id number]]  (Abnormal) Collected: 12/29/20 1833    Lab Status: Final result Specimen: Blood from Arm, Left Updated: 12/29/20 2024     NT-proBNP 420 pg/mL     Ferritin [[id number]]     Lab Status: No result Specimen: Blood     Smear Review(Phlebs Do Not Order) [[id number]] Collected: 12/29/20 1833    Lab Status: Final result Specimen: Blood from Arm, Left Updated: 12/29/20 1912     RBC Morphology abnormal     Hypochromia Present     Microcytes Present     Platelet Estimate Adequate    Troponin I [[id number]]  (Normal) Collected: 12/29/20 1833    Lab Status: Final result Specimen: Blood from Arm, Left Updated: 12/29/20 1908     Troponin I <0 01 ng/mL     Comprehensive metabolic panel [[id number]]  (Abnormal) Collected: 12/29/20 1833    Lab Status: Final result Specimen: Blood from Arm, Left Updated: 12/29/20 1856     Sodium 137 mmol/L      Potassium 3 7 mmol/L      Chloride 101 mmol/L      CO2 31 mmol/L      ANION GAP 5 mmol/L      BUN 12 mg/dL      Creatinine 0 89 mg/dL      Glucose 118 mg/dL      Calcium 7 9 mg/dL      Corrected Calcium 8 5 mg/dL       U/L      ALT 71 U/L      Alkaline Phosphatase 59 U/L      Total Protein 6 5 g/dL      Albumin 3 2 g/dL      Total Bilirubin 0 70 mg/dL      eGFR 69 ml/min/1 73sq m     Narrative:      [place] guidelines for Chronic Kidney Disease (CKD):     Stage 1 with normal or high GFR (GFR > 90 mL/min/1 73 square meters)    Stage 2 Mild CKD (GFR = 60-89 mL/min/1 73 square meters)    Stage 3A Moderate CKD (GFR = 45-59 mL/min/1 73 square meters)    Stage 3B Moderate CKD (GFR = 30-44 mL/min/1 73 square meters)    Stage 4 Severe CKD (GFR = 15-29 mL/min/1 73 square meters)    Stage 5 End Stage CKD (GFR <15 mL/min/1 73 square meters)  Note: GFR calculation is accurate only with a steady state creatinine    Lipase [[id number]]  (Normal) Collected: 12/29/20 1833 Lab Status: Final result Specimen: Blood from Arm, Left Updated: 12/29/20 1856     Lipase 161 u/L     CBC and differential [[id number]]  (Abnormal) Collected: 12/29/20 1833    Lab Status: Final result Specimen: Blood from Arm, Left Updated: 12/29/20 1856     WBC 10 10 Thousand/uL      RBC 3 78 Million/uL      Hemoglobin 9 8 g/dL      Hematocrit 29 8 %      MCV 79 fL      MCH 25 9 pg      MCHC 32 8 g/dL      RDW 14 2 %      MPV 9 4 fL      Platelets 581 Thousands/uL      Neutrophils Relative 80 %      Lymphocytes Relative 12 %      Monocytes Relative 8 %      Eosinophils Relative 0 %      Basophils Relative 0 %      Neutrophils Absolute 8 00 Thousands/µL      Lymphocytes Absolute 1 20 Thousands/µL      Monocytes Absolute 0 80 Thousand/µL      Eosinophils Absolute 0 00 Thousand/µL      Basophils Absolute 0 00 Thousands/µL                  XR chest 1 view portable    (Results Pending)              Procedures  ECG 12 Lead Documentation Only    Date/Time: 12/29/2020 6:27 PM  Performed by: Capo Tang PA-C  Authorized by: Capo Tang PA-C     Indications / Diagnosis:  Sob, cp  ECG reviewed by me, the ED Provider: yes    Patient location:  ED  Interpretation:     Interpretation: abnormal    Rate:     ECG rate:  86    ECG rate assessment: normal    Rhythm:     Rhythm: sinus rhythm    Ectopy:     Ectopy: none    Conduction:     Conduction: normal    ST segments:     ST segments:  Normal  T waves:     T waves: normal    Comments:      LBBB, qtc 466             ED Course  ED Course as of Dec 29 2047   Tue Dec 29, 2020   1834 Advised by [employer] that pt was sat at 88% on RA, will place on 2L NC                                SBIRT 22yo+      Most Recent Value   SBIRT (25 yo +)   In order to provide better care to our patients, we are screening all of our patients for alcohol and drug use  Would it be okay to ask you these screening questions?   Yes Filed at: 12/29/2020 1902   Initial Alcohol Screen: US AUDIT-C    1  How often do you have a drink containing alcohol?  0 Filed at: 12/29/2020 1834   2  How many drinks containing alcohol do you have on a typical day you are drinking? 0 Filed at: 12/29/2020 1834   3a  Male UNDER 65: How often do you have five or more drinks on one occasion? 0 Filed at: 12/29/2020 1834   3b  FEMALE Any Age, or MALE 65+: How often do you have 4 or more drinks on one occassion? 0 Filed at: 12/29/2020 1834   Audit-C Score  0 Filed at: 12/29/2020 5183   AYAKA: How many times in the past year have you    Used an illegal drug or used a prescription medication for non-medical reasons? Never Filed at: 12/29/2020 1834                    MDM  Number of Diagnoses or Management Options  Anemia:   Dyspnea:   Pneumonia due to COVID-19 virus:   Diagnosis management comments: 59 yo F presenting for evaluation of increased sob after dx covid 12/21, pt with 88% on RA, pt now on 2L NC with oxygen now 96%, labs consistent with preivous, will admit due to need for O2 with covid pathway    Portions of the record may have been created with voice recognition software  Occasional wrong word or "sound a like" substitutions may have occurred due to the inherent limitations of voice recognition software  Read the chart carefully and recognize, using context, where substitutions have occurred          Disposition  Final diagnoses:   Anemia   Dyspnea   Pneumonia due to COVID-19 virus     Time reflects when diagnosis was documented in both MDM as applicable and the Disposition within this note     Time User Action Codes Description Comment    12/29/2020  6:59 PM Lina Zhang Add [D64 9] Anemia     12/29/2020  7:28 PM Murjeet Casth Add [R06 00] Dyspnea     12/29/2020  7:28 PM Lina Casth Add [U07 1,  J12 89] Pneumonia due to COVID-19 virus       ED Disposition     ED Disposition Condition Date/Time Comment    Admit Stable Tue Dec 29, 2020  7:29 PM Case was discussed with SLIM PA-C and the patient's admission status was agreed to be Admission Status: inpatient status to the service of Dr Mahoney Presume   Follow-up Information    None         Patient's Medications   Discharge Prescriptions    No medications on file     No discharge procedures on file      PDMP Review       Value Time User    PDMP Reviewed  Yes 12/3/2020  9:06 AM Johnny Nettles MD          ED Provider  Electronically Signed by           Denys Thomas PA-C  12/29/20 2048

## 2020-12-29 NOTE — Clinical Note
Case was discussed with SLIM PA-C and the patient's admission status was agreed to be Admission Status: inpatient status to the service of Dr Neil Camacho

## 2020-12-29 NOTE — LETTER
[address] [facility]  [address] [place] [id number] [phone]  Dept: [phone]    January 2, 2021     Patient: Eliezer Fernandez   YOB: 1955   Date of Visit: 12/29/2020       To Whom it May Concern:    Norman Marion is under my professional care  She was seen in the hospital from 12/29/2020   to 01/02/21  She may return to work on 01/10/2021 without limitations  If you have any questions or concerns, please don't hesitate to call           Sincerely,          Margo Fernandez MD

## 2020-12-30 LAB
ALBUMIN SERPL BCP-MCNC: 2.9 G/DL (ref 3–5.2)
ALP SERPL-CCNC: 55 U/L (ref 43–122)
ALT SERPL W P-5'-P-CCNC: 61 U/L (ref 9–52)
ANION GAP SERPL CALCULATED.3IONS-SCNC: 5 MMOL/L (ref 5–14)
AST SERPL W P-5'-P-CCNC: 91 U/L (ref 14–36)
BACTERIA UR QL AUTO: NORMAL /HPF
BASOPHILS # BLD AUTO: 0 THOUSANDS/ΜL (ref 0–0.1)
BASOPHILS NFR BLD AUTO: 0 % (ref 0–1)
BILIRUB SERPL-MCNC: 0.5 MG/DL
BILIRUB UR QL STRIP: NEGATIVE
BUN SERPL-MCNC: 12 MG/DL (ref 5–25)
CALCIUM ALBUM COR SERPL-MCNC: 8.7 MG/DL (ref 8.3–10.1)
CALCIUM SERPL-MCNC: 7.8 MG/DL (ref 8.4–10.2)
CHLORIDE SERPL-SCNC: 109 MMOL/L (ref 97–108)
CK MB SERPL-MCNC: 1.4 NG/ML (ref 0–2.4)
CK MB SERPL-MCNC: <1 % (ref 0–2.5)
CK SERPL-CCNC: 394 U/L (ref 30–135)
CLARITY UR: CLEAR
CO2 SERPL-SCNC: 27 MMOL/L (ref 22–30)
COLOR UR: ABNORMAL
CREAT SERPL-MCNC: 0.68 MG/DL (ref 0.6–1.2)
EOSINOPHIL # BLD AUTO: 0 THOUSAND/ΜL (ref 0–0.4)
EOSINOPHIL NFR BLD AUTO: 0 % (ref 0–6)
ERYTHROCYTE [DISTWIDTH] IN BLOOD BY AUTOMATED COUNT: 14.5 %
FERRITIN SERPL-MCNC: 286 NG/ML (ref 8–388)
FERRITIN SERPL-MCNC: 322 NG/ML (ref 8–388)
GFR SERPL CREATININE-BSD FRML MDRD: 93 ML/MIN/1.73SQ M
GLUCOSE SERPL-MCNC: 112 MG/DL (ref 65–140)
GLUCOSE SERPL-MCNC: 146 MG/DL (ref 70–99)
GLUCOSE UR STRIP-MCNC: NEGATIVE MG/DL
HCT VFR BLD AUTO: 31.7 % (ref 36–46)
HGB BLD-MCNC: 10.1 G/DL (ref 12–16)
HGB UR QL STRIP.AUTO: 10
IRON SATN MFR SERPL: 8 %
IRON SERPL-MCNC: 22 UG/DL (ref 50–170)
KETONES UR STRIP-MCNC: NEGATIVE MG/DL
LEUKOCYTE ESTERASE UR QL STRIP: NEGATIVE
LYMPHOCYTES # BLD AUTO: 0.9 THOUSANDS/ΜL (ref 0.5–4)
LYMPHOCYTES NFR BLD AUTO: 12 % (ref 25–45)
MCH RBC QN AUTO: 25.7 PG (ref 26–34)
MCHC RBC AUTO-ENTMCNC: 31.8 G/DL (ref 31–36)
MCV RBC AUTO: 81 FL (ref 80–100)
MONOCYTES # BLD AUTO: 0.3 THOUSAND/ΜL (ref 0.2–0.9)
MONOCYTES NFR BLD AUTO: 3 % (ref 1–10)
NEUTROPHILS # BLD AUTO: 6.7 THOUSANDS/ΜL (ref 1.8–7.8)
NEUTS SEG NFR BLD AUTO: 85 % (ref 45–65)
NITRITE UR QL STRIP: NEGATIVE
NON-SQ EPI CELLS URNS QL MICRO: NORMAL /HPF
PH UR STRIP.AUTO: 5 [PH]
PLATELET # BLD AUTO: 275 THOUSANDS/UL (ref 150–450)
PMV BLD AUTO: 10 FL (ref 8.9–12.7)
POTASSIUM SERPL-SCNC: 4.4 MMOL/L (ref 3.6–5)
PROCALCITONIN SERPL-MCNC: 0.07 NG/ML
PROCALCITONIN SERPL-MCNC: 0.11 NG/ML
PROT SERPL-MCNC: 6.2 G/DL (ref 5.9–8.4)
PROT UR STRIP-MCNC: ABNORMAL MG/DL
RBC # BLD AUTO: 3.92 MILLION/UL (ref 4–5.2)
RBC #/AREA URNS AUTO: NORMAL /HPF
SODIUM SERPL-SCNC: 141 MMOL/L (ref 137–147)
SP GR UR STRIP.AUTO: 1.01 (ref 1–1.04)
TIBC SERPL-MCNC: 261 UG/DL (ref 250–450)
UROBILINOGEN UA: NEGATIVE MG/DL
WBC # BLD AUTO: 7.9 THOUSAND/UL (ref 4.5–11)
WBC #/AREA URNS AUTO: NORMAL /HPF

## 2020-12-30 PROCEDURE — 83540 ASSAY OF IRON: CPT | Performed by: INTERNAL MEDICINE

## 2020-12-30 PROCEDURE — 82550 ASSAY OF CK (CPK): CPT | Performed by: PHYSICIAN ASSISTANT

## 2020-12-30 PROCEDURE — 85025 COMPLETE CBC W/AUTO DIFF WBC: CPT | Performed by: PHYSICIAN ASSISTANT

## 2020-12-30 PROCEDURE — 82728 ASSAY OF FERRITIN: CPT | Performed by: INTERNAL MEDICINE

## 2020-12-30 PROCEDURE — 82948 REAGENT STRIP/BLOOD GLUCOSE: CPT

## 2020-12-30 PROCEDURE — 83550 IRON BINDING TEST: CPT | Performed by: INTERNAL MEDICINE

## 2020-12-30 PROCEDURE — 80053 COMPREHEN METABOLIC PANEL: CPT | Performed by: PHYSICIAN ASSISTANT

## 2020-12-30 PROCEDURE — 99232 SBSQ HOSP IP/OBS MODERATE 35: CPT | Performed by: INTERNAL MEDICINE

## 2020-12-30 PROCEDURE — 81001 URINALYSIS AUTO W/SCOPE: CPT | Performed by: PHYSICIAN ASSISTANT

## 2020-12-30 PROCEDURE — 84145 PROCALCITONIN (PCT): CPT | Performed by: PHYSICIAN ASSISTANT

## 2020-12-30 PROCEDURE — 81003 URINALYSIS AUTO W/O SCOPE: CPT | Performed by: PHYSICIAN ASSISTANT

## 2020-12-30 PROCEDURE — 82553 CREATINE MB FRACTION: CPT | Performed by: PHYSICIAN ASSISTANT

## 2020-12-30 RX ORDER — LIDOCAINE HYDROCHLORIDE 20 MG/ML
15 SOLUTION OROPHARYNGEAL 4 TIMES DAILY PRN
Status: DISCONTINUED | OUTPATIENT
Start: 2020-12-30 | End: 2021-01-02 | Stop reason: HOSPADM

## 2020-12-30 RX ORDER — ALPRAZOLAM 0.25 MG/1
0.25 TABLET ORAL 2 TIMES DAILY PRN
Status: DISCONTINUED | OUTPATIENT
Start: 2020-12-30 | End: 2021-01-02 | Stop reason: HOSPADM

## 2020-12-30 RX ORDER — FLUTICASONE PROPIONATE 50 MCG
1 SPRAY, SUSPENSION (ML) NASAL DAILY
Status: DISCONTINUED | OUTPATIENT
Start: 2020-12-30 | End: 2021-01-02 | Stop reason: HOSPADM

## 2020-12-30 RX ORDER — BENZONATATE 100 MG/1
100 CAPSULE ORAL 3 TIMES DAILY PRN
Status: DISCONTINUED | OUTPATIENT
Start: 2020-12-30 | End: 2021-01-02 | Stop reason: HOSPADM

## 2020-12-30 RX ORDER — ALBUTEROL SULFATE 90 UG/1
2 AEROSOL, METERED RESPIRATORY (INHALATION) EVERY 4 HOURS PRN
Status: DISCONTINUED | OUTPATIENT
Start: 2020-12-30 | End: 2021-01-02 | Stop reason: HOSPADM

## 2020-12-30 RX ORDER — LEVOTHYROXINE SODIUM 0.07 MG/1
75 TABLET ORAL DAILY
Status: DISCONTINUED | OUTPATIENT
Start: 2020-12-30 | End: 2021-01-02 | Stop reason: HOSPADM

## 2020-12-30 RX ADMIN — DOXYCYCLINE 100 MG: 100 CAPSULE ORAL at 20:19

## 2020-12-30 RX ADMIN — OXYCODONE HYDROCHLORIDE AND ACETAMINOPHEN 1000 MG: 500 TABLET ORAL at 20:20

## 2020-12-30 RX ADMIN — HEPARIN SODIUM 5000 UNITS: 5000 INJECTION INTRAVENOUS; SUBCUTANEOUS at 05:18

## 2020-12-30 RX ADMIN — HEPARIN SODIUM 5000 UNITS: 5000 INJECTION INTRAVENOUS; SUBCUTANEOUS at 14:19

## 2020-12-30 RX ADMIN — HEPARIN SODIUM 5000 UNITS: 5000 INJECTION INTRAVENOUS; SUBCUTANEOUS at 21:00

## 2020-12-30 RX ADMIN — FAMOTIDINE 20 MG: 20 TABLET ORAL at 17:37

## 2020-12-30 RX ADMIN — FLUTICASONE PROPIONATE 1 SPRAY: 50 SPRAY, METERED NASAL at 17:36

## 2020-12-30 RX ADMIN — ALPRAZOLAM 0.25 MG: 0.25 TABLET ORAL at 20:21

## 2020-12-30 RX ADMIN — SODIUM CHLORIDE 125 ML/HR: 0.9 INJECTION, SOLUTION INTRAVENOUS at 09:17

## 2020-12-30 RX ADMIN — CEFTRIAXONE 1000 MG: 1 INJECTION, SOLUTION INTRAVENOUS at 20:22

## 2020-12-30 RX ADMIN — ACETAMINOPHEN 650 MG: 325 TABLET ORAL at 20:20

## 2020-12-30 RX ADMIN — Medication 2000 UNITS: at 09:18

## 2020-12-30 RX ADMIN — REMDESIVIR 100 MG: 100 INJECTION, POWDER, LYOPHILIZED, FOR SOLUTION INTRAVENOUS at 22:18

## 2020-12-30 RX ADMIN — DOXYCYCLINE 100 MG: 100 CAPSULE ORAL at 09:18

## 2020-12-30 RX ADMIN — ZINC SULFATE 220 MG (50 MG) CAPSULE 220 MG: CAPSULE at 09:18

## 2020-12-30 RX ADMIN — DEXAMETHASONE SODIUM PHOSPHATE 6 MG: 4 INJECTION, SOLUTION INTRA-ARTICULAR; INTRALESIONAL; INTRAMUSCULAR; INTRAVENOUS; SOFT TISSUE at 20:20

## 2020-12-30 RX ADMIN — LEVOTHYROXINE SODIUM 75 MCG: 75 TABLET ORAL at 16:50

## 2020-12-30 RX ADMIN — FAMOTIDINE 20 MG: 20 TABLET ORAL at 09:18

## 2020-12-30 RX ADMIN — SODIUM CHLORIDE 125 ML/HR: 0.9 INJECTION, SOLUTION INTRAVENOUS at 22:18

## 2020-12-30 RX ADMIN — OXYCODONE HYDROCHLORIDE AND ACETAMINOPHEN 1000 MG: 500 TABLET ORAL at 09:18

## 2020-12-30 RX ADMIN — SODIUM CHLORIDE 125 ML/HR: 0.9 INJECTION, SOLUTION INTRAVENOUS at 17:36

## 2020-12-30 RX ADMIN — ONDANSETRON 4 MG: 2 INJECTION INTRAMUSCULAR; INTRAVENOUS at 20:20

## 2020-12-30 NOTE — ASSESSMENT & PLAN NOTE
· Admit to telemetry  · Patient was diagnosed COVID positive 12/21/2020  · Patient has new O2 requirement is requiring O2 2 L nasal cannula to maintain O2 saturations  · Will obtain baseline COVID labs and trend is indicated  · Will begin remdesivir  · Place on Decadron 6 mg IV daily as well as O2 and respiratory protocol  · Will begin Rocephin and that her mycin  · Trend procalcitonin  · Will give Proventil 90 mcg 2 puffs q 4 hours p r n  · Antipyretics p r n    · Will place on vitamin-C, vitamin D, zinc and multi Lesli per protocol

## 2020-12-30 NOTE — NURSING NOTE
Patient is awake, alert, and oriented to person, place, and time  Denies any pain  Continues with shortness of breath  Vital signs are stable  No change from initial assessment  Call bell in reach  Will continue to monitor

## 2020-12-30 NOTE — ASSESSMENT & PLAN NOTE
· Symptoms started since 12/21/2020  · CTA PE study no PE there is bilateral ground-glass opacity consistent with COVID-19 pneumonia  · Patient has new O2 requirement is requiring O2 3 L nasal cannula to maintain O2 saturations  · Goal oxygen saturation 90-96%  · Inflammatory markers CRP elevated to 140s ferritin normal and D-dimer 2 6 will trend  · Continue remdesivir to total 5 days  · Continue Decadron 6 mg IV daily for 10 days as well as O2 and respiratory protocol  · Continue Rocephin and doxycycline until 2 normal procalcitonin  · Initial procalcitonin normal will check a m  · Continue Proventil 90 mcg 2 puffs q 4 hours p r n  · Antipyretics p r n    · Continue on vitamin-C, vitamin D, zinc and multi Lesli per protocol  · DVT prophylaxis group C  · Should continue isolation until 01/09/2021 given oxygen requirement and and start date 12/21/2020 duration should be 20 days

## 2020-12-30 NOTE — UTILIZATION REVIEW
Initial Clinical Review    Admission: Date/Time/Statement:   Admission Orders (From admission, onward)     Ordered        12/29/20 1929  Inpatient Admission  Once                   Orders Placed This Encounter   Procedures    Inpatient Admission     Standing Status:   Standing     Number of Occurrences:   1     Order Specific Question:   Admitting Physician     Answer:   Blue Sethi [[id number]]     Order Specific Question:   Level of Care     Answer:   Med Surg [[id number]]     Order Specific Question:   Estimated length of stay     Answer:   More than 2 Midnights     Order Specific Question:   Certification     Answer:   I certify that inpatient services are medically necessary for this patient for a duration of greater than two midnights  See H&P and MD Progress Notes for additional information about the patient's course of treatment  ED Arrival Information     Expected Arrival Acuity Means of Arrival Escorted By Service Admission Type    - 12/29/2020 17:41 Urgent Walk-In Self General Medicine Urgent    Arrival Complaint    shortness of breath/chest pain         Chief Complaint   Patient presents with    Shortness of Breath     pt c/o sob, weakness     Assessment/Plan: 59 yo female w/ pmh hld  Presents to ED with SOB, weakness  Tested positive for COVID on 12/21  She has been seen in ED twice since dx'd w/ covid, had negative pe study but positive pneumonia  Had telemed visit w/ PCP today who referred to ED due to worsening symptoms sob, chest pain, general body aches, weakness, nausea not relieved w/ zofran   Also notes diarrhea x 2 days and poor appetite   also sick w/ covid  On exam: temp 100 9, tachypneic, increased resp effort  O2 sat dropped to 88% on RA and pt was placed on 2L NC  Elevated CRP, D-dimer, ast & alt, Hg 9 8,  w/ neg trop  NTproBNP 420  CXR shows MID and lower lung zone peripheral airspace opacities consistent with Covid 19       Admitted to Inpatient with Acute Respiratory Failure with Hypoxia and Pneumonia due to COVID-19  Plan: Telemetry, supplemental O2, IV Remdesivir & Decadron,, IV Rocephin, vitamin-C, vitamin D, zinc and multi Lesli  Elevated CK - hydrate w/ NSS @ 125 / hr and monitor labs  Anemia slightly worse than baseline  10 4 - 12 - monitor labs  12/30 Sat 83 % on 2 L NC  Improved to 94% on 2 L w/ prone positioning   Currently requiring 3 L NC        ED Triage Vitals   Temperature Pulse Respirations Blood Pressure SpO2   12/29/20 1759 12/29/20 1759 12/29/20 1759 12/29/20 1759 12/29/20 1759   (!) 100 9 °F (38 3 °C) 88 16 114/63 93 %      Temp Source Heart Rate Source Patient Position - Orthostatic VS BP Location FiO2 (%)   12/29/20 1759 12/29/20 1759 12/29/20 1759 12/29/20 1759 --   Tympanic Monitor Sitting Left arm       Pain Score       12/29/20 1824       Worst Possible Pain          Wt Readings from Last 1 Encounters:   12/30/20 60 5 kg (133 lb 6 1 oz)     Additional Vital Signs:   12/30/20 0730  97 6 °F (36 4 °C) 62 18 118/57 97 % 3 L/min Nasal cannula Lying   12/30/20 0520      94 %  3 L/min Nasal cannula    SpO2: On Right side at 12/30/20 0520   12/30/20 0518      90 %  2 L/min Nasal cannula    SpO2: on right side at 12/30/20 0518   12/30/20 0428      94 %  2 L/min Nasal cannula    SpO2: patient is proned at 12/30/20 0428   12/30/20 0426      83 %Abnormal  2 L/min Nasal cannula    12/30/20 0300      98 % 2 L/min Nasal cannula    12/29/20 2320  96 6 °F (35 9 °C) 70 20 106/60 95 % 2 L/min Nasal cannula Lying   12/29/20 2300      96 % 2 L/min Nasal cannula    12/29/20 2215  97 9 °F (36 6 °C) 81 20 114/60 94 % 2 L/min Nasal cannula Lying   12/29/20 2055   68 20 125/68 98 % 2 L/min Nasal cannula    12/29/20 1838      96 % 2 L/min Nasal cannula    12/29/20 1830   83 20 119/57 89 %Abnormal   None (Room air) Lying       Pertinent Labs/Diagnostic Test Results:   Lab Units 12/30/20  0641 12/29/20  1833   WBC Thousand/uL 7 90 10 10   HEMOGLOBIN g/dL 10 1* 9 8*   HEMATOCRIT % 31 7* 29 8*   PLATELETS Thousands/uL 275 257   NEUTROS ABS Thousands/µL 6 70 8 00*     Lab Units 12/30/20  0641 12/29/20  1833   SODIUM mmol/L 141 137   POTASSIUM mmol/L 4 4 3 7   CHLORIDE mmol/L 109* 101   CO2 mmol/L 27 31*   ANION GAP mmol/L 5 5   BUN mg/dL 12 12   CREATININE mg/dL 0 68 0 89   EGFR ml/min/1 73sq m 93 69   CALCIUM mg/dL 7 8* 7 9*     Lab Units 12/30/20  0641 12/29/20  1833   AST U/L 91* 126*   ALT U/L 61* 71*   ALK PHOS U/L 55 59   TOTAL PROTEIN g/dL 6 2 6 5   ALBUMIN g/dL 2 9* 3 2   TOTAL BILIRUBIN mg/dL 0 50 0 70     Results from last 7 days   Lab Units 12/30/20  1106   POC GLUCOSE mg/dl 112     Lab Units 12/30/20  0641 12/29/20  1833   GLUCOSE RANDOM mg/dL 146* 118*       Results from last 7 days   Lab Units 12/30/20  0641 12/29/20  1833   CK TOTAL U/L 394* 408*   CK MB INDEX % <1 0 <1 0   CK MB ng/mL 1 4 0 8     Lab Units 12/29/20  1833   TROPONIN I ng/mL <0 01     Lab Units 12/29/20  2042   D-DIMER QUANTITATIVE ug/ml FEU 2 60*     Lab Units 12/29/20  2042   PROCALCITONIN ng/ml 0 11     Lab Units 12/29/20  2043   LACTIC ACID mmol/L 0 8     Results from last 7 days   Lab Units 12/29/20  1833   NT-PRO BNP pg/mL 420*     Results from last 7 days   Lab Units 12/29/20  2042   FERRITIN ng/mL 322     Lab Units 12/29/20  1833   LIPASE u/L 161     Lab Units 12/29/20  1833   CRP mg/L 143 7*     Lab Units 12/30/20  0440   CLARITY UA  Clear   COLOR UA  Straw   SPEC GRAV UA  1 010   PH UA  5 0   GLUCOSE UA mg/dl Negative   KETONES UA mg/dl Negative   BLOOD UA  10 0*   PROTEIN UA mg/dl 30 (1+)*   NITRITE UA  Negative   BILIRUBIN UA  Negative   UROBILINOGEN UA mg/dL Negative   LEUKOCYTES UA  Negative   WBC UA /hpf None Seen   RBC UA /hpf 0-1   BACTERIA UA /hpf None Seen   EPITHELIAL CELLS WET PREP /hpf Occasional     Lab Units 12/29/20 2041   BLOOD CULTURE  Received in Microbiology Lab  Culture in Progress  Received in Microbiology Lab   Culture in Progress  12/29 EKG:  ECG rate:  86    ECG rate assessment: normal       Rhythm: sinus rhythm      Ectopy: none       Conduction: normal      ST segments:  Normal     T waves: normal    Comments:      LBBB, qtc 466    12/29  CXR:  MID and lower lung zone peripheral airspace opacities consistent with Covid 19         ED Treatment:   Medication Administration from 12/29/2020 1740 to 12/29/2020 2205       Date/Time Order Dose Route Action     12/29/2020 1834 ondansetron (ZOFRAN) injection 4 mg 4 mg Intravenous Given     12/29/2020 1836 sodium chloride 0 9 % bolus 1,000 mL 1,000 mL Intravenous New Bag     12/29/2020 1824 acetaminophen (TYLENOL) tablet 650 mg 650 mg Oral Given     12/29/2020 1835 LORazepam (ATIVAN) injection 1 mg 2 mg Intravenous Given     12/29/2020 2107 dexamethasone (DECADRON) injection 6 mg 6 mg Intravenous Given     12/29/2020 2057 ascorbic acid (VITAMIN C) tablet 1,000 mg 1,000 mg Oral Given     12/29/2020 2057 famotidine (PEPCID) tablet 20 mg 20 mg Oral Given     12/29/2020 2112 heparin (porcine) subcutaneous injection 5,000 Units 5,000 Units Subcutaneous Given     12/29/2020 2142 remdesivir (Veklury) 200 mg in sodium chloride 0 9 % 250 mL IVPB 200 mg Intravenous New Bag     12/29/2020 2056 doxycycline hyclate (VIBRAMYCIN) capsule 100 mg 100 mg Oral Given     12/29/2020 2105 cefTRIAXone (ROCEPHIN) IVPB (premix in dextrose) 1,000 mg 50 mL 1,000 mg Intravenous New Bag        Past Medical History:   Diagnosis Date    Adhesive capsulitis of left shoulder     Last assessed - 2/16/15    Allergic rhinitis     Last assessed - 3/17/15    Anxiety     Back pain     Disease of thyroid gland     hypo    Hyperlipidemia     borderline , no meds    LBBB (left bundle branch block)     chronic    Migraine     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 for tuberculosis     Psychiatric disorder     Vitamin D deficiency      Present on Admission:   Pneumonia due to COVID-19 virus   Hypothyroidism due to acquired atrophy of thyroid   Depression with anxiety   Anemia   Elevated CK   Acute respiratory failure with hypoxia (HCC)      Admitting Diagnosis: Shortness of breath [R06 02]  Dyspnea [R06 00]  Anemia [D64 9]  Pneumonia due to COVID-19 virus [U07 1, J12 89]  Age/Sex: 60 y o  female  Admission Orders:  Scheduled Medications:  ascorbic acid, 1,000 mg, Oral, Q12H ALLAN  cefTRIAXone, 1,000 mg, Intravenous, Q24H  cholecalciferol, 2,000 Units, Oral, Daily  dexamethasone, 6 mg, Intravenous, Q24H  doxycycline hyclate, 100 mg, Oral, Q12H  famotidine, 20 mg, Oral, BID  heparin (porcine), 5,000 Units, Subcutaneous, Q8H ALLAN  zinc sulfate, 220 mg, Oral, Daily    Followed by  Dimitris Marie ON 1/6/2021] multivitamin-minerals, 1 tablet, Oral, Daily  remdesivir, 100 mg, Intravenous, Q24H    Continuous IV Infusions:  sodium chloride, 125 mL/hr, Intravenous, Continuous    PRN Meds:  acetaminophen, 650 mg, Oral, Q6H PRN  ondansetron, 4 mg, Intravenous, Q6H PRN    SCDS    Network Utilization Review Department  ATTENTION: Please call with any questions or concerns to [phone] and carefully listen to the prompts so that you are directed to the right person  All voicemails are confidential   Ilsa Sharma all requests for admission clinical reviews, approved or denied determinations and any other requests to dedicated fax number below belonging to the campus where the patient is receiving treatment   List of dedicated fax numbers for the Facilities:  [address] DENIALS (Administrative/Medical Necessity) [phone]   [address] (Maternity/NICU/Pediatrics) [phone]   [address] [place] [address] Dr Zia Finney 5789 (Shin Ackerman "Anh" 103) [address] [address] [facility] [place] [address] Ricco Sneed 1481 [phone]   [address] 951 [phone]

## 2020-12-30 NOTE — ASSESSMENT & PLAN NOTE
Procedure cancelled by Neurosurgery. MD to notify family. Day nurse and patient notified.    This appears to be slightly worse than baseline which appears to be approximately 10 4 to 12 versus today's of 9 8, will continue monitor with repeat labs in a m

## 2020-12-30 NOTE — PROGRESS NOTES
Progress Note - Efraín Serrano 1955, 60 y o  female MRN: [id number]    Unit/Bed#: 7T [facility] 710-01 Encounter: [id number]    Primary Care Provider: Johnny Nettles MD   Date and time admitted to hospital: 12/29/2020  5:56 PM        * Pneumonia due to COVID-19 virus  Assessment & Plan  · Symptoms started since 12/21/2020  · CTA PE study no PE there is bilateral ground-glass opacity consistent with COVID-19 pneumonia  · Patient has new O2 requirement is requiring O2 3 L nasal cannula to maintain O2 saturations  · Goal oxygen saturation 90-96%  · Inflammatory markers CRP elevated to 140s ferritin normal and D-dimer 2 6 will trend  · Continue remdesivir to total 5 days  · Continue Decadron 6 mg IV daily for 10 days as well as O2 and respiratory protocol  · Continue Rocephin and doxycycline until 2 normal procalcitonin  · Initial procalcitonin normal will check a m  · Continue Proventil 90 mcg 2 puffs q 4 hours p r n  · Antipyretics p r n  · Continue on vitamin-C, vitamin D, zinc and multi Lesli per protocol  · DVT prophylaxis group C  · Should continue isolation until 01/09/2021 given oxygen requirement and and start date 12/21/2020 duration should be 20 days    Acute respiratory failure with hypoxia (HCC)  Assessment & Plan  Oxygen saturations sad to high 80s requiring 2 L nasal cannula supplementation upon admission  Secondary to COVID-19 pneumonia being treated as per above  Anemia  Assessment & Plan  Baseline hemoglobin 11-12 currently 9 8   Check iron panel  will continue to monitor  Hypothyroidism due to acquired atrophy of thyroid  Assessment & Plan  Continue pre-hospital levothyroxine 75 mcg p o  Daily    Depression with anxiety  Assessment & Plan  Continue pre-hospital Xanax 0  Two 5 mg p o  B i d  P r n          VTE Pharmacologic Prophylaxis:   Pharmacologic: Heparin  Mechanical VTE Prophylaxis in Place: No    Patient Centered Rounds: Discussed with nursing    Discussions with Specialists or Other Care Team Provider:  Discussed with case management    Education and Discussions with Family / Patient:  Updated  over the phone  Time Spent for Care: 20 minutes  More than 50% of total time spent on counseling and coordination of care as described above  Current Length of Stay: 1 day(s)    Current Patient Status: Inpatient   Certification Statement: The patient will continue to require additional inpatient hospital stay due to COVID pneumonia    Discharge Plan:  Anticipate discharge 2-3 days    Code Status: Level 1 - Full Code      Subjective:   Patient seen and examined at bedside she has pleuritic chest pain and difficulty breathing  Objective:     Vitals:   Temp (24hrs), Av 5 °F (36 9 °C), Min:96 6 °F (35 9 °C), Max:100 9 °F (38 3 °C)    Temp:  [96 6 °F (35 9 °C)-100 9 °F (38 3 °C)] 96 6 °F (35 9 °C)  HR:  [68-88] 70  Resp:  [16-20] 20  BP: (106-125)/(57-68) 106/60  SpO2:  [83 %-98 %] 94 %  Body mass index is 26 94 kg/m²  Input and Output Summary (last 24 hours): Intake/Output Summary (Last 24 hours) at 2020 0818  Last data filed at 2020 0440  Gross per 24 hour   Intake 2050 ml   Output 1000 ml   Net 1050 ml       Physical Exam:     Physical Exam  Vitals signs reviewed  Constitutional:       Appearance: She is ill-appearing  She is not toxic-appearing or diaphoretic  HENT:      Nose: No rhinorrhea  Eyes:      General:         Right eye: No discharge  Left eye: No discharge  Cardiovascular:      Rate and Rhythm: Normal rate and regular rhythm  Heart sounds: Normal heart sounds  No murmur  Pulmonary:      Effort: Respiratory distress present  Breath sounds: Rales present  No wheezing  Abdominal:      General: Bowel sounds are normal  There is no distension  Palpations: Abdomen is soft  Neurological:      Mental Status: She is alert and oriented to person, place, and time     Psychiatric:         Mood and Affect: Mood normal  Behavior: Behavior normal            Additional Data:     Labs:    Results from last 7 days   Lab Units 12/29/20  1833   WBC Thousand/uL 10 10   HEMOGLOBIN g/dL 9 8*   HEMATOCRIT % 29 8*   PLATELETS Thousands/uL 257   NEUTROS PCT % 80*   LYMPHS PCT % 12*   MONOS PCT % 8   EOS PCT % 0     Results from last 7 days   Lab Units 12/29/20  1833   SODIUM mmol/L 137   POTASSIUM mmol/L 3 7   CHLORIDE mmol/L 101   CO2 mmol/L 31*   BUN mg/dL 12   CREATININE mg/dL 0 89   ANION GAP mmol/L 5   CALCIUM mg/dL 7 9*   ALBUMIN g/dL 3 2   TOTAL BILIRUBIN mg/dL 0 70   ALK PHOS U/L 59   ALT U/L 71*   AST U/L 126*   GLUCOSE RANDOM mg/dL 118*                 Results from last 7 days   Lab Units 12/29/20 2043 12/29/20 2042 12/26/20  1115   LACTIC ACID mmol/L 0 8  --  1 3   PROCALCITONIN ng/ml  --  0 11 <0 05           * I Have Reviewed All Lab Data Listed Above  * Additional Pertinent Lab Tests Reviewed: All Labs Within Last 24 Hours Reviewed    Imaging:    Imaging Reports Reviewed Today Include:  CT a PE study and chest x-ray  Imaging Personally Reviewed by Myself Includes:  None    Recent Cultures (last 7 days):     Results from last 7 days   Lab Units 12/29/20 2041 12/26/20  1115   BLOOD CULTURE  Received in Microbiology Lab  Culture in Progress  Received in Microbiology Lab  Culture in Progress  No Growth at 72 hrs  No Growth at 72 hrs         Last 24 Hours Medication List:   Current Facility-Administered Medications   Medication Dose Route Frequency Provider Last Rate    acetaminophen  650 mg Oral Q6H PRN Kip Koyanagi, PA-C      ascorbic acid  1,000 mg Oral Q12H Canda Dash, PA-C      cefTRIAXone  1,000 mg Intravenous Q24H Kip Koyanagi, PA-C Stopped (12/29/20 2141)    cholecalciferol  2,000 Units Oral Daily Kip Koyanagi, PA-C      dexamethasone  6 mg Intravenous Q24H Kip Koyanagi, PA-C      doxycycline hyclate  100 mg Oral Q12H Kip Koyanagi, PA-C      famotidine  20 mg Oral BID Kip Koyanagi, PA-C      heparin (porcine)  5,000 Units Subcutaneous [facility] Flor Hicks PA-C      zinc sulfate  220 mg Oral Daily Flor Hicks PA-C      Followed by   Chon Farley ON 1/6/2021] multivitamin-minerals  1 tablet Oral Daily Flor Hicks PA-C      ondansetron  4 mg Intravenous Q6H PRN Flor Hicks PA-C      remdesivir  100 mg Intravenous Q24H Flor Hicks PA-C      sodium chloride  125 mL/hr Intravenous Continuous Flor Hicks PA-C 125 mL/hr (12/29/20 0765)        Today, Patient Was Seen By: Torres Billings MD    ** Please Note: Dictation voice to text software may have been used in the creation of this document   **

## 2020-12-30 NOTE — PLAN OF CARE
Problem: PAIN - ADULT  Goal: Verbalizes/displays adequate comfort level or baseline comfort level  Description: Interventions:  - Encourage patient to monitor pain and request assistance  - Assess pain using appropriate pain scale  - Administer analgesics based on type and severity of pain and evaluate response  - Implement non-pharmacological measures as appropriate and evaluate response  - Consider cultural and social influences on pain and pain management  - Notify physician/advanced practitioner if interventions unsuccessful or patient reports new pain  Outcome: Progressing     Problem: INFECTION - ADULT  Goal: Absence or prevention of progression during hospitalization  Description: INTERVENTIONS:  - Assess and monitor for signs and symptoms of infection  - Monitor lab/diagnostic results  - Monitor all insertion sites, i e  indwelling lines, tubes, and drains  - Monitor endotracheal if appropriate and nasal secretions for changes in amount and color  - [place] appropriate cooling/warming therapies per order  - Administer medications as ordered  - Instruct and encourage patient and family to use good hand hygiene technique  - Identify and instruct in appropriate isolation precautions for identified infection/condition  Outcome: Progressing  Goal: Absence of fever/infection during neutropenic period  Description: INTERVENTIONS:  - Monitor WBC    Outcome: Progressing     Problem: SAFETY ADULT  Goal: Patient will remain free of falls  Description: INTERVENTIONS:  - Assess patient frequently for physical needs  -  Identify cognitive and physical deficits and behaviors that affect risk of falls    -  [place] fall precautions as indicated by assessment   - Educate patient/family on patient safety including physical limitations  - Instruct patient to call for assistance with activity based on assessment  - Modify environment to reduce risk of injury  - Consider OT/PT consult to assist with strengthening/mobility  Outcome: Progressing  Goal: Maintain or return to baseline ADL function  Description: INTERVENTIONS:  -  Assess patient's ability to carry out ADLs; assess patient's baseline for ADL function and identify physical deficits which impact ability to perform ADLs (bathing, care of mouth/teeth, toileting, grooming, dressing, etc )  - Assess/evaluate cause of self-care deficits   - Assess range of motion  - Assess patient's mobility; develop plan if impaired  - Assess patient's need for assistive devices and provide as appropriate  - Encourage maximum independence but intervene and supervise when necessary  - Involve family in performance of ADLs  - Assess for home care needs following discharge   - Consider OT consult to assist with ADL evaluation and planning for discharge  - Provide patient education as appropriate  Outcome: Progressing  Goal: Maintain or return mobility status to optimal level  Description: INTERVENTIONS:  - Assess patient's baseline mobility status (ambulation, transfers, stairs, etc )    - Identify cognitive and physical deficits and behaviors that affect mobility  - Identify mobility aids required to assist with transfers and/or ambulation (gait belt, sit-to-stand, lift, walker, cane, etc )  - [place] fall precautions as indicated by assessment  - Record patient progress and toleration of activity level on Mobility SBAR; progress patient to next Phase/Stage  - Instruct patient to call for assistance with activity based on assessment  - Consider rehabilitation consult to assist with strengthening/weightbearing, etc   Outcome: Progressing     Problem: DISCHARGE PLANNING  Goal: Discharge to home or other facility with appropriate resources  Description: INTERVENTIONS:  - Identify barriers to discharge w/patient and caregiver  - Arrange for needed discharge resources and transportation as appropriate  - Identify discharge learning needs (meds, wound care, etc )  - Arrange for interpretive services to assist at discharge as needed  - Refer to Case Management Department for coordinating discharge planning if the patient needs post-hospital services based on physician/advanced practitioner order or complex needs related to functional status, cognitive ability, or social support system  Outcome: Progressing     Problem: Knowledge Deficit  Goal: Patient/family/caregiver demonstrates understanding of disease process, treatment plan, medications, and discharge instructions  Description: Complete learning assessment and assess knowledge base    Interventions:  - Provide teaching at level of understanding  - Provide teaching via preferred learning methods  Outcome: Progressing     Problem: RESPIRATORY - ADULT  Goal: Achieves optimal ventilation and oxygenation  Description: INTERVENTIONS:  - Assess for changes in respiratory status  - Assess for changes in mentation and behavior  - Position to facilitate oxygenation and minimize respiratory effort  - Oxygen administered by appropriate delivery if ordered  - Initiate smoking cessation education as indicated  - Encourage broncho-pulmonary hygiene including cough, deep breathe, Incentive Spirometry  - Assess the need for suctioning and aspirate as needed  - Assess and instruct to report SOB or any respiratory difficulty  - Respiratory Therapy support as indicated  Outcome: Progressing     Problem: GASTROINTESTINAL - ADULT  Goal: Minimal or absence of nausea and/or vomiting  Description: INTERVENTIONS:  - Administer IV fluids if ordered to ensure adequate hydration  - Maintain NPO status until nausea and vomiting are resolved  - Nasogastric tube if ordered  - Administer ordered antiemetic medications as needed  - Provide nonpharmacologic comfort measures as appropriate  - Advance diet as tolerated, if ordered  - Consider nutrition services referral to assist patient with adequate nutrition and appropriate food choices  Outcome: Progressing  Goal: Maintains or returns to baseline bowel function  Description: INTERVENTIONS:  - Assess bowel function  - Encourage oral fluids to ensure adequate hydration  - Administer IV fluids if ordered to ensure adequate hydration  - Administer ordered medications as needed  - Encourage mobilization and activity  - Consider nutritional services referral to assist patient with adequate nutrition and appropriate food choices  Outcome: Progressing  Goal: Maintains adequate nutritional intake  Description: INTERVENTIONS:  - Monitor percentage of each meal consumed  - Identify factors contributing to decreased intake, treat as appropriate  - Assist with meals as needed  - Monitor I&O, weight, and lab values if indicated  - Obtain nutrition services referral as needed  Outcome: Progressing

## 2020-12-30 NOTE — ED NOTES
This nurse called pharmacy to request [facility] as ordered       Arsenio Danielle RN  12/29/20 2011

## 2020-12-30 NOTE — ASSESSMENT & PLAN NOTE
Oxygen saturations sad to high 80s requiring 2 L nasal cannula supplementation upon admission  Secondary to COVID-19 pneumonia being treated as per above

## 2020-12-30 NOTE — H&P
H&P- Parish Saldana 1955, 60 y o  female MRN: [id number]    Unit/Bed#: [address] [address] Encounter: [id number]    Primary Care Provider: Maynor Milner MD   Date and time admitted to hospital: 12/29/2020  5:56 PM        * Pneumonia due to COVID-19 virus  Assessment & Plan  · Admit to telemetry  · Patient was diagnosed COVID positive 12/21/2020  · Patient has new O2 requirement is requiring O2 2 L nasal cannula to maintain O2 saturations  · Will obtain baseline COVID labs and trend is indicated  · Will begin remdesivir  · Place on Decadron 6 mg IV daily as well as O2 and respiratory protocol  · Will begin Rocephin and that her mycin  · Trend procalcitonin  · Will give Proventil 90 mcg 2 puffs q 4 hours p r n  · Antipyretics p r n  · Will place on vitamin-C, vitamin D, zinc and multi Lesli per protocol    Acute respiratory failure with hypoxia ([facility] [address] )  Assessment & Plan  Secondary to COVID-19 pneumonia being treated as per above  Elevated CK  Assessment & Plan  Will hydrate with normal saline 125 cc/hour continue to monitor with repeat labs in a m  Anemia  Assessment & Plan  This appears to be slightly worse than baseline which appears to be approximately 10 4 to 12 versus today's of 9 8, will continue monitor with repeat labs in a m  Hypothyroidism due to acquired atrophy of thyroid  Assessment & Plan  Continue pre-hospital levothyroxine 75 mcg p o  Daily    Depression with anxiety  Assessment & Plan  Continue pre-hospital Xanax 0  Two 5 mg p o  B i d  P r n  TeleMedicine H&P - [facility] Internal Medicine    Patient Information: Norm Les 60 y o  female MRN: [id number]  Unit/Bed#: [address] [place] [address] Encounter: [id number]  Admitting Physician: Rochell Duverney, PA-C  PCP: Maynor Milner MD  Date of Admission:  12/29/20    REQUIRED DOCUMENTATION:     1  This service was provided via Telemedicine  2  Provider located at [facility]    3  TeleMed provider: Rochell Duverney, PA-C   4  Identify all parties in room with patient during tele consult:  Patient and patient's nurse  5  After connecting through [employer], patient was identified by name and date of birth and assistant checked wristband  Patient was then informed that this was a Telemedicine visit and that the exam was being conducted confidentially over secure lines  My office door was closed  No one else was in the room  Patient acknowledged consent and understanding of privacy and security of the Telemedicine visit, and gave us permission to have the assistant stay in the room in order to assist with the history and to conduct the exam   I informed the patient that I have reviewed their record in Epic and presented the opportunity for them to ask any questions regarding the visit today  The patient agreed to participate  VTE Prophylaxis: Heparin  / sequential compression device   Code Status:  Level 1  Discussion with family:  None present at bedside at time of exam    Anticipated Length of Stay:  Patient will be admitted on an Inpatient basis with an anticipated length of stay of  greater than 2 midnights  Justification for Hospital Stay:  Acute hypoxic respiratory failure secondary to COVID-19 pneumonia requiring O2 support, IV antibiotics and close monitoring    Total Time for Visit, including Counseling / Coordination of Care: 45 minutes  Greater than 50% of this total time spent on direct patient counseling and coordination of care  Chief Complaint:   Increasing shortness of breath and generalized weakness x1 week    History of Present Illness:    Amber Mcmillan is a 60 y o  female who presents with increasing shortness of breath and generalized weakness x1 week  Patient was diagnosed COVID positive on 12/21/2020 and has presented the ER multiple times for increasing shortness of breath as well as generalized weakness over the past week    Patient states that she is dyspneic with minimal activity and has no respiratory history of was not previously requiring oxygen but is currently on 2 L nasal cannula in order to maintain O2 saturation of 96%  Patient was hypoxic with an O2 saturation as low was 89% on room air while in the ER  Patient additionally reports some diarrhea for the past 2 days which is currently resolved as well as poor p o  Intake with generalized weakness, myalgias and arthralgias  Patient states that she has used her albuterol MDI approximately 4 times today with no improvement additionally complaining of some nausea was unrelieved with Zofran  Patient denies any vomiting does complain of bilateral lower quadrant abdominal pain  She complains of some subjective fevers but denies any urinary complaints to include hematuria, dysuria or increased urinary frequency  Review of Systems:    Review of Systems   Constitutional: Positive for activity change, chills, fatigue and fever  HENT: Negative for congestion and sore throat  Respiratory: Negative for cough, shortness of breath and wheezing  Cardiovascular: Negative for chest pain and palpitations  Gastrointestinal: Positive for abdominal pain, diarrhea and nausea  Negative for vomiting  Genitourinary: Negative for dysuria, frequency, hematuria and urgency  Musculoskeletal: Negative for arthralgias and myalgias  Skin: Negative for wound  Neurological: Negative for dizziness, syncope, light-headedness and headaches  All other systems reviewed and are negative        Past Medical and Surgical History:     Past Medical History:   Diagnosis Date    Adhesive capsulitis of left shoulder     Last assessed - 2/16/15    Allergic rhinitis     Last assessed - 3/17/15    Anxiety     Back pain     Disease of thyroid gland     hypo    Hyperlipidemia     borderline , no meds    LBBB (left bundle branch block)     chronic    Migraine     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 for tuberculosis     Psychiatric disorder     Vitamin D deficiency        Past Surgical History: Procedure Laterality Date    BREAST CYST EXCISION Bilateral     20yrs ago    COLONOSCOPY      HYSTERECTOMY      IL SKIN TISSUE PROCEDURE UNLISTED N/A 7/25/2019    Procedure: ABDOMINOPLASTY WITH LIPOSUCTION;  Surgeon: Olena Pagan MD;  Location: [address] [place] [address];  Service: Plastics    SHOULDER ARTHROSCOPY      Last assessed - 12/23/14    SHOULDER ARTHROSCOPY W/ ROTATOR CUFF REPAIR      Last assessed - 4/4/16       Meds/Allergies:    Prior to Admission medications    Medication Sig Start Date End Date Taking? Authorizing Provider   acetaminophen (TYLENOL) 500 mg tablet Take 1 tablet (500 mg total) by mouth every 6 (six) hours as needed for mild pain 12/26/20   Mundo Wheeler PA-C   albuterol (PROVENTIL HFA,VENTOLIN HFA) 90 mcg/act inhaler Inhale 2 puffs every 4 (four) hours as needed for wheezing 12/26/20   Natividad Napoles PA-C   ALPRAZolam Brain New) 0 5 mg tablet Take a half or 1 tablet twice a day as needed for anxiety   12/3/20   Augie Zamora MD   Ascorbic Acid (vitamin C) 1000 MG tablet Take 1 tablet (1,000 mg total) by mouth daily for 7 days 12/26/20 1/2/21  Mundo Wheeler PA-C   bismuth subsalicylate (PEPTO BISMOL) 262 MG chewable tablet Chew 2 tablets (524 mg total) 4 (four) times a day (before meals and at bedtime) 12/26/20   Mundo Wheeler PA-C   cholecalciferol (VITAMIN D3) 1,000 units tablet Take 2 tablets (2,000 Units total) by mouth daily 12/26/20   Mundo Wheeler PA-C   dextromethorphan-guaiFENesin (ROBITUSSIN DM)  mg/5 mL syrup Take 5 mL by mouth 3 (three) times a day as needed for cough or congestion for up to 10 days 12/26/20 1/5/21  Mundo Wheeler PA-C   ergocalciferol (VITAMIN D2) 50,000 units Take 1 capsule (50,000 Units total) by mouth every 28 days 12/3/20 6/1/21  Augie Zamora MD   fluticasone [facility]) 50 mcg/act nasal spray 2 sprays into each nostril daily 6/2/20   Augie Zamora MD   fluticasone (FLONASE) 50 mcg/act nasal spray 1 spray into each nostril daily 12/26/20   Preeti YUDY Chavira   hydrocortisone 2 5 % cream Apply topically 2 (two) times a day For 1 week only every month as needed 12/11/19   Colin Avila MD   ibuprofen (MOTRIN) 400 mg tablet Take 1 tablet (400 mg total) by mouth every 6 (six) hours as needed (pain) 12/26/20   Preetimelissa Chavira PA-C   levothyroxine 75 mcg tablet Take 1 tablet (75 mcg total) by mouth daily 12/3/20   Colin Avila MD   menthol-cetylpyridinium (CEPACOL) 3 MG lozenge Take 1 lozenge (3 mg total) by mouth as needed for sore throat 12/26/20   Preeti YUDY Chavira   Multiple Vitamin (multivitamin) capsule Take 1 capsule by mouth daily 12/26/20   Preetimelissa Chavira PA-C   naproxen (NAPROSYN) 500 mg tablet One tablet twice a day with meals as needed for pain and the trigger finger 4/21/20   Colin Avila MD   ondansetron (ZOFRAN-ODT) 4 mg disintegrating tablet Take 1 tablet (4 mg total) by mouth every 8 (eight) hours as needed for nausea for up to 10 days 12/26/20 1/5/21  Preetimelissa Chavira PA-C   PREMARIN vaginal cream  12/20/17   Historical Provider, MD   senna-docusate sodium (SENOKOT-S) 8 6-50 mg per tablet Take 1 tablet by mouth daily  Patient not taking: Reported on 12/29/2020 8/8/19   Colin Avila MD     all medications and allergies reviewed    Allergies: No Known Allergies    Social History:     Marital Status:    Occupation:    Patient Pre-hospital Living Situation:  Resides at home with   Patient Pre-hospital Level of Mobility:  Full without assist  Patient Pre-hospital Diet Restrictions:  None  Substance Use History:   Social History     Substance and Sexual Activity   Alcohol Use Never    Frequency: Never    Binge frequency: Never     Social History     Tobacco Use   Smoking Status Never Smoker   Smokeless Tobacco Never Used     Social History     Substance and Sexual Activity   Drug Use No       Family History:  I have reviewed the patients family history     Physical Exam:     Vitals:   Blood Pressure: 114/60 (12/29/20 2215)  Pulse: 81 (12/29/20 2215)  Temperature: 97 9 °F (36 6 °C) (12/29/20 2215)  Temp Source: Temporal (12/29/20 2215)  Respirations: 20 (12/29/20 2215)  Weight - Scale: 60 kg (132 lb 4 4 oz) (12/29/20 2215)  SpO2: 94 % (12/29/20 2215)    Physical Exam  Vitals signs and nursing note reviewed  Constitutional:       General: She is not in acute distress  Appearance: She is ill-appearing  HENT:      Head: Normocephalic and atraumatic  Mouth/Throat:      Mouth: Mucous membranes are moist       Pharynx: No posterior oropharyngeal erythema  Comments: As per nursing exam given remote location  Eyes:      Extraocular Movements: Extraocular movements intact  Conjunctiva/sclera: Conjunctivae normal       Pupils: Pupils are equal, round, and reactive to light  Comments: As per nursing exam given remote location   Neck:      Musculoskeletal: Normal range of motion and neck supple  No muscular tenderness  Comments: As per nursing exam given remote location  Cardiovascular:      Rate and Rhythm: Normal rate and regular rhythm  Pulses: Normal pulses  Heart sounds: Normal heart sounds  No murmur  Comments: As per nursing exam given remote location  Pulmonary:      Effort: Pulmonary effort is normal  Tachypnea present  No respiratory distress  Breath sounds: Decreased breath sounds present  No wheezing, rhonchi or rales  Comments: Tachypnea with minimal activity but able to recover with rest and O2  Abdominal:      General: Bowel sounds are normal       Palpations: Abdomen is soft  Tenderness: There is no abdominal tenderness  Musculoskeletal:      Left lower leg: No edema  Skin:     General: Skin is warm and dry  Capillary Refill: Capillary refill takes less than 2 seconds  Neurological:      General: No focal deficit present  Mental Status: She is alert and oriented to person, place, and time  Additional Data:     Lab Results: I have personally reviewed pertinent reports  Results from last 7 days   Lab Units 12/29/20  1833   WBC Thousand/uL 10 10   HEMOGLOBIN g/dL 9 8*   HEMATOCRIT % 29 8*   PLATELETS Thousands/uL 257   NEUTROS PCT % 80*   LYMPHS PCT % 12*   MONOS PCT % 8   EOS PCT % 0     Results from last 7 days   Lab Units 12/29/20  1833   SODIUM mmol/L 137   POTASSIUM mmol/L 3 7   CHLORIDE mmol/L 101   CO2 mmol/L 31*   BUN mg/dL 12   CREATININE mg/dL 0 89   ANION GAP mmol/L 5   CALCIUM mg/dL 7 9*   ALBUMIN g/dL 3 2   TOTAL BILIRUBIN mg/dL 0 70   ALK PHOS U/L 59   ALT U/L 71*   AST U/L 126*   GLUCOSE RANDOM mg/dL 118*                 Results from last 7 days   Lab Units 12/29/20 2043 12/26/20  1115   LACTIC ACID mmol/L 0 8 1 3   PROCALCITONIN ng/ml  --  <0 05       Imaging: I have personally reviewed pertinent reports  XR chest 1 view portable    (Results Pending)       EKG, Pathology, and Other Studies Reviewed on Admission:   · EKG:  Normal sinus rhythm at a rate of 86 with left bundle branch block    Epic / Care Everywhere Records Reviewed: Yes    ** Please Note: This note has been constructed using a voice recognition system   **

## 2020-12-30 NOTE — ED ATTENDING ATTESTATION
I was the attending physician on duty at the time the patient visited the emergency department  The patient was evaluated and dispositioned by the APC  I was personally available for consultation  I am administratively signing the chart after the fact      Shelley Martinez MD

## 2020-12-31 LAB
ALBUMIN SERPL BCP-MCNC: 2.5 G/DL (ref 3–5.2)
ALP SERPL-CCNC: 57 U/L (ref 43–122)
ALT SERPL W P-5'-P-CCNC: 63 U/L (ref 9–52)
ANION GAP SERPL CALCULATED.3IONS-SCNC: 5 MMOL/L (ref 5–14)
AST SERPL W P-5'-P-CCNC: 66 U/L (ref 14–36)
BACTERIA BLD CULT: NORMAL
BACTERIA BLD CULT: NORMAL
BASOPHILS # BLD AUTO: 0 THOUSANDS/ΜL (ref 0–0.1)
BASOPHILS NFR BLD AUTO: 0 % (ref 0–1)
BILIRUB SERPL-MCNC: 0.3 MG/DL
BUN SERPL-MCNC: 20 MG/DL (ref 5–25)
CALCIUM ALBUM COR SERPL-MCNC: 9.3 MG/DL (ref 8.3–10.1)
CALCIUM SERPL-MCNC: 8.1 MG/DL (ref 8.4–10.2)
CHLORIDE SERPL-SCNC: 115 MMOL/L (ref 97–108)
CO2 SERPL-SCNC: 24 MMOL/L (ref 22–30)
CREAT SERPL-MCNC: 0.7 MG/DL (ref 0.6–1.2)
CRP SERPL QL: 79.4 MG/L
EOSINOPHIL # BLD AUTO: 0 THOUSAND/ΜL (ref 0–0.4)
EOSINOPHIL NFR BLD AUTO: 0 % (ref 0–6)
ERYTHROCYTE [DISTWIDTH] IN BLOOD BY AUTOMATED COUNT: 14.7 %
GFR SERPL CREATININE-BSD FRML MDRD: 92 ML/MIN/1.73SQ M
GLUCOSE SERPL-MCNC: 141 MG/DL (ref 70–99)
HCT VFR BLD AUTO: 30.3 % (ref 36–46)
HGB BLD-MCNC: 9.6 G/DL (ref 12–16)
LYMPHOCYTES # BLD AUTO: 1.7 THOUSANDS/ΜL (ref 0.5–4)
LYMPHOCYTES NFR BLD AUTO: 12 % (ref 25–45)
MCH RBC QN AUTO: 25.6 PG (ref 26–34)
MCHC RBC AUTO-ENTMCNC: 31.7 G/DL (ref 31–36)
MCV RBC AUTO: 81 FL (ref 80–100)
MONOCYTES # BLD AUTO: 0.5 THOUSAND/ΜL (ref 0.2–0.9)
MONOCYTES NFR BLD AUTO: 4 % (ref 1–10)
NEUTROPHILS # BLD AUTO: 11.2 THOUSANDS/ΜL (ref 1.8–7.8)
NEUTS SEG NFR BLD AUTO: 84 % (ref 45–65)
PLATELET # BLD AUTO: 321 THOUSANDS/UL (ref 150–450)
PMV BLD AUTO: 10 FL (ref 8.9–12.7)
POTASSIUM SERPL-SCNC: 4 MMOL/L (ref 3.6–5)
PROT SERPL-MCNC: 5.6 G/DL (ref 5.9–8.4)
RBC # BLD AUTO: 3.76 MILLION/UL (ref 4–5.2)
SODIUM SERPL-SCNC: 144 MMOL/L (ref 137–147)
WBC # BLD AUTO: 13.4 THOUSAND/UL (ref 4.5–11)

## 2020-12-31 PROCEDURE — 99232 SBSQ HOSP IP/OBS MODERATE 35: CPT | Performed by: INTERNAL MEDICINE

## 2020-12-31 PROCEDURE — 85025 COMPLETE CBC W/AUTO DIFF WBC: CPT | Performed by: INTERNAL MEDICINE

## 2020-12-31 PROCEDURE — 80053 COMPREHEN METABOLIC PANEL: CPT | Performed by: INTERNAL MEDICINE

## 2020-12-31 PROCEDURE — 86140 C-REACTIVE PROTEIN: CPT | Performed by: INTERNAL MEDICINE

## 2020-12-31 RX ORDER — FERROUS SULFATE 325(65) MG
325 TABLET ORAL EVERY OTHER DAY
Status: DISCONTINUED | OUTPATIENT
Start: 2020-12-31 | End: 2021-01-02 | Stop reason: HOSPADM

## 2020-12-31 RX ADMIN — BISMUTH SUBSALICYLATE 524 MG: 262 LIQUID ORAL at 21:11

## 2020-12-31 RX ADMIN — ALPRAZOLAM 0.25 MG: 0.25 TABLET ORAL at 09:21

## 2020-12-31 RX ADMIN — HEPARIN SODIUM 5000 UNITS: 5000 INJECTION INTRAVENOUS; SUBCUTANEOUS at 05:30

## 2020-12-31 RX ADMIN — FAMOTIDINE 20 MG: 20 TABLET ORAL at 17:14

## 2020-12-31 RX ADMIN — LEVOTHYROXINE SODIUM 75 MCG: 75 TABLET ORAL at 05:31

## 2020-12-31 RX ADMIN — ALPRAZOLAM 0.25 MG: 0.25 TABLET ORAL at 16:33

## 2020-12-31 RX ADMIN — REMDESIVIR 100 MG: 100 INJECTION, POWDER, LYOPHILIZED, FOR SOLUTION INTRAVENOUS at 21:11

## 2020-12-31 RX ADMIN — SODIUM CHLORIDE 125 ML/HR: 0.9 INJECTION, SOLUTION INTRAVENOUS at 10:58

## 2020-12-31 RX ADMIN — FLUTICASONE PROPIONATE 1 SPRAY: 50 SPRAY, METERED NASAL at 09:00

## 2020-12-31 RX ADMIN — HEPARIN SODIUM 5000 UNITS: 5000 INJECTION INTRAVENOUS; SUBCUTANEOUS at 21:11

## 2020-12-31 RX ADMIN — OXYCODONE HYDROCHLORIDE AND ACETAMINOPHEN 1000 MG: 500 TABLET ORAL at 21:11

## 2020-12-31 RX ADMIN — BENZONATATE 100 MG: 100 CAPSULE ORAL at 16:33

## 2020-12-31 RX ADMIN — HEPARIN SODIUM 5000 UNITS: 5000 INJECTION INTRAVENOUS; SUBCUTANEOUS at 15:47

## 2020-12-31 RX ADMIN — ZINC SULFATE 220 MG (50 MG) CAPSULE 220 MG: CAPSULE at 08:47

## 2020-12-31 RX ADMIN — ONDANSETRON 4 MG: 2 INJECTION INTRAMUSCULAR; INTRAVENOUS at 09:21

## 2020-12-31 RX ADMIN — BISMUTH SUBSALICYLATE 524 MG: 262 LIQUID ORAL at 08:49

## 2020-12-31 RX ADMIN — DEXAMETHASONE SODIUM PHOSPHATE 6 MG: 4 INJECTION, SOLUTION INTRA-ARTICULAR; INTRALESIONAL; INTRAMUSCULAR; INTRAVENOUS; SOFT TISSUE at 21:10

## 2020-12-31 RX ADMIN — ACETAMINOPHEN 650 MG: 325 TABLET ORAL at 16:33

## 2020-12-31 RX ADMIN — FERROUS SULFATE TAB 325 MG (65 MG ELEMENTAL FE) 325 MG: 325 (65 FE) TAB at 11:00

## 2020-12-31 RX ADMIN — OXYCODONE HYDROCHLORIDE AND ACETAMINOPHEN 1000 MG: 500 TABLET ORAL at 08:47

## 2020-12-31 RX ADMIN — DOXYCYCLINE 100 MG: 100 CAPSULE ORAL at 08:47

## 2020-12-31 RX ADMIN — BISMUTH SUBSALICYLATE 524 MG: 262 LIQUID ORAL at 11:24

## 2020-12-31 RX ADMIN — Medication 2000 UNITS: at 08:47

## 2020-12-31 RX ADMIN — ACETAMINOPHEN 650 MG: 325 TABLET ORAL at 09:21

## 2020-12-31 RX ADMIN — FAMOTIDINE 20 MG: 20 TABLET ORAL at 08:47

## 2020-12-31 RX ADMIN — BISMUTH SUBSALICYLATE 524 MG: 262 LIQUID ORAL at 17:13

## 2020-12-31 RX ADMIN — BENZONATATE 100 MG: 100 CAPSULE ORAL at 05:31

## 2020-12-31 NOTE — PLAN OF CARE
Problem: PAIN - ADULT  Goal: Verbalizes/displays adequate comfort level or baseline comfort level  Description: Interventions:  - Encourage patient to monitor pain and request assistance  - Assess pain using appropriate pain scale  - Administer analgesics based on type and severity of pain and evaluate response  - Implement non-pharmacological measures as appropriate and evaluate response  - Consider cultural and social influences on pain and pain management  - Notify physician/advanced practitioner if interventions unsuccessful or patient reports new pain  Outcome: Progressing     Problem: INFECTION - ADULT  Goal: Absence or prevention of progression during hospitalization  Description: INTERVENTIONS:  - Assess and monitor for signs and symptoms of infection  - Monitor lab/diagnostic results  - Monitor all insertion sites, i e  indwelling lines, tubes, and drains  - Monitor endotracheal if appropriate and nasal secretions for changes in amount and color  - [place] appropriate cooling/warming therapies per order  - Administer medications as ordered  - Instruct and encourage patient and family to use good hand hygiene technique  - Identify and instruct in appropriate isolation precautions for identified infection/condition  Outcome: Progressing  Goal: Absence of fever/infection during neutropenic period  Description: INTERVENTIONS:  - Monitor WBC    Outcome: Progressing     Problem: SAFETY ADULT  Goal: Patient will remain free of falls  Description: INTERVENTIONS:  - Assess patient frequently for physical needs  -  Identify cognitive and physical deficits and behaviors that affect risk of falls    -  [place] fall precautions as indicated by assessment   - Educate patient/family on patient safety including physical limitations  - Instruct patient to call for assistance with activity based on assessment  - Modify environment to reduce risk of injury  - Consider OT/PT consult to assist with strengthening/mobility  Outcome: Progressing  Goal: Maintain or return to baseline ADL function  Description: INTERVENTIONS:  -  Assess patient's ability to carry out ADLs; assess patient's baseline for ADL function and identify physical deficits which impact ability to perform ADLs (bathing, care of mouth/teeth, toileting, grooming, dressing, etc )  - Assess/evaluate cause of self-care deficits   - Assess range of motion  - Assess patient's mobility; develop plan if impaired  - Assess patient's need for assistive devices and provide as appropriate  - Encourage maximum independence but intervene and supervise when necessary  - Involve family in performance of ADLs  - Assess for home care needs following discharge   - Consider OT consult to assist with ADL evaluation and planning for discharge  - Provide patient education as appropriate  Outcome: Progressing  Goal: Maintain or return mobility status to optimal level  Description: INTERVENTIONS:  - Assess patient's baseline mobility status (ambulation, transfers, stairs, etc )    - Identify cognitive and physical deficits and behaviors that affect mobility  - Identify mobility aids required to assist with transfers and/or ambulation (gait belt, sit-to-stand, lift, walker, cane, etc )  - [place] fall precautions as indicated by assessment  - Record patient progress and toleration of activity level on Mobility SBAR; progress patient to next Phase/Stage  - Instruct patient to call for assistance with activity based on assessment  - Consider rehabilitation consult to assist with strengthening/weightbearing, etc   Outcome: Progressing     Problem: DISCHARGE PLANNING  Goal: Discharge to home or other facility with appropriate resources  Description: INTERVENTIONS:  - Identify barriers to discharge w/patient and caregiver  - Arrange for needed discharge resources and transportation as appropriate  - Identify discharge learning needs (meds, wound care, etc )  - Arrange for interpretive services to assist at discharge as needed  - Refer to Case Management Department for coordinating discharge planning if the patient needs post-hospital services based on physician/advanced practitioner order or complex needs related to functional status, cognitive ability, or social support system  Outcome: Progressing     Problem: Knowledge Deficit  Goal: Patient/family/caregiver demonstrates understanding of disease process, treatment plan, medications, and discharge instructions  Description: Complete learning assessment and assess knowledge base    Interventions:  - Provide teaching at level of understanding  - Provide teaching via preferred learning methods  Outcome: Progressing     Problem: RESPIRATORY - ADULT  Goal: Achieves optimal ventilation and oxygenation  Description: INTERVENTIONS:  - Assess for changes in respiratory status  - Assess for changes in mentation and behavior  - Position to facilitate oxygenation and minimize respiratory effort  - Oxygen administered by appropriate delivery if ordered  - Initiate smoking cessation education as indicated  - Encourage broncho-pulmonary hygiene including cough, deep breathe, Incentive Spirometry  - Assess the need for suctioning and aspirate as needed  - Assess and instruct to report SOB or any respiratory difficulty  - Respiratory Therapy support as indicated  Outcome: Progressing     Problem: GASTROINTESTINAL - ADULT  Goal: Minimal or absence of nausea and/or vomiting  Description: INTERVENTIONS:  - Administer IV fluids if ordered to ensure adequate hydration  - Maintain NPO status until nausea and vomiting are resolved  - Nasogastric tube if ordered  - Administer ordered antiemetic medications as needed  - Provide nonpharmacologic comfort measures as appropriate  - Advance diet as tolerated, if ordered  - Consider nutrition services referral to assist patient with adequate nutrition and appropriate food choices  Outcome: Progressing  Goal: Maintains or returns to baseline bowel function  Description: INTERVENTIONS:  - Assess bowel function  - Encourage oral fluids to ensure adequate hydration  - Administer IV fluids if ordered to ensure adequate hydration  - Administer ordered medications as needed  - Encourage mobilization and activity  - Consider nutritional services referral to assist patient with adequate nutrition and appropriate food choices  Outcome: Progressing  Goal: Maintains adequate nutritional intake  Description: INTERVENTIONS:  - Monitor percentage of each meal consumed  - Identify factors contributing to decreased intake, treat as appropriate  - Assist with meals as needed  - Monitor I&O, weight, and lab values if indicated  - Obtain nutrition services referral as needed  Outcome: Progressing

## 2020-12-31 NOTE — UTILIZATION REVIEW
Continued Stay Review    Date: 12/31/20                          Current Patient Class: IP  Current Level of Care: [facility] [place]    HPI:61 y o  female initially admitted on  12/29 to IP due to Acute Respiratory Failure with Hypoxia and Pneumonia due to COVID-19  Assessment/Plan:  Continues with SINGH & rest but improving  Weaning O2 as tolerated - currently on 1L  Continue IV Remdesivir & Decadron  DC'd abx as normal procalcitonin x 2  Cont Vit C,D , zinc and sq heparin   Start ferrous sulfate 325 mg every other day for anemia  and continue vitamin-C per COVID protocol can transition to 500 mg daily upon discharge for better iron absorption    Pertinent Labs/Diagnostic Results:     Lab Units 12/31/20  0457 12/30/20  0641 12/29/20  1833   WBC Thousand/uL 13 40* 7 90 10 10   HEMOGLOBIN g/dL 9 6* 10 1* 9 8*   HEMATOCRIT % 30 3* 31 7* 29 8*   PLATELETS Thousands/uL 321 275 257   NEUTROS ABS Thousands/µL 11 20* 6 70 8 00*     Lab Units 12/31/20  0456 12/30/20  0641 12/29/20  1833   SODIUM mmol/L 144 141 137   POTASSIUM mmol/L 4 0 4 4 3 7   CHLORIDE mmol/L 115* 109* 101   CO2 mmol/L 24 27 31*   ANION GAP mmol/L 5 5 5   BUN mg/dL 20 12 12   CREATININE mg/dL 0 70 0 68 0 89   EGFR ml/min/1 73sq m 92 93 69   CALCIUM mg/dL 8 1* 7 8* 7 9*     Lab Units 12/31/20  0456 12/30/20  0641 12/29/20  1833   AST U/L 66* 91* 126*   ALT U/L 63* 61* 71*   ALK PHOS U/L 57 55 59   TOTAL PROTEIN g/dL 5 6* 6 2 6 5   ALBUMIN g/dL 2 5* 2 9* 3 2   TOTAL BILIRUBIN mg/dL 0 30 0 50 0 70     Results from last 7 days   Lab Units 12/30/20  1106   POC GLUCOSE mg/dl 112     Lab Units 12/31/20  0456 12/30/20  0641 12/29/20  1833   GLUCOSE RANDOM mg/dL 141* 146* 118*     Results from last 7 days   Lab Units 12/30/20  0641 12/29/20  1833   CK TOTAL U/L 394* 408*   CK MB INDEX % <1 0 <1 0   CK MB ng/mL 1 4 0 8     Lab Units 12/29/20  1833   TROPONIN I ng/mL <0 01     Lab Units 12/29/20  2042   D-DIMER QUANTITATIVE ug/ml FEU 2 60*     Lab Units 12/30/20  3494 12/29/20  2042   PROCALCITONIN ng/ml 0 07 0 11     Lab Units 12/29/20  2043   LACTIC ACID mmol/L 0 8     Results from last 7 days   Lab Units 12/29/20  1833   NT-PRO BNP pg/mL 420*     Results from last 7 days   Lab Units 12/30/20  0641 12/29/20  2042   FERRITIN ng/mL 286 322     Lab Units 12/29/20  1833   LIPASE u/L 161     Lab Units 12/31/20  0456 12/29/20  1833   CRP mg/L 79 4* 143 7*     Lab Units 12/30/20  0440   CLARITY UA  Clear   COLOR UA  Straw   SPEC GRAV UA  1 010   PH UA  5 0   GLUCOSE UA mg/dl Negative   KETONES UA mg/dl Negative   BLOOD UA  10 0*   PROTEIN UA mg/dl 30 (1+)*   NITRITE UA  Negative   BILIRUBIN UA  Negative   UROBILINOGEN UA mg/dL Negative   LEUKOCYTES UA  Negative   WBC UA /hpf None Seen   RBC UA /hpf 0-1   BACTERIA UA /hpf None Seen   EPITHELIAL CELLS WET PREP /hpf Occasional     Lab Units 12/29/20  2041   BLOOD CULTURE  No Growth at 24 hrs  No Growth at 24 hrs       Vital Signs:   12/31/20 1100            94 %   Nasal cannula    Nasal Cannula O2 Flow Rate (L/min): 1 L/min at 12/31/20 1100   12/31/20 0900            94 %   Nasal cannula    Nasal Cannula O2 Flow Rate (L/min): 1 L/min at 12/31/20 0900   12/31/20 0735  96 8 °F (36 °C)  65  20  117/65    94 % 1 5 L/min Nasal cannula Lying     Medications:   Scheduled Medications:  ascorbic acid, 1,000 mg, Oral, Q12H ALLAN  bismuth subsalicylate, 961 mg, Oral, 4x Daily  cholecalciferol, 2,000 Units, Oral, Daily  dexamethasone, 6 mg, Intravenous, Q24H  famotidine, 20 mg, Oral, BID  ferrous sulfate, 325 mg, Oral, Every Other Day  fluticasone, 1 spray, Nasal, Daily  heparin (porcine), 5,000 Units, Subcutaneous, Q8H [address]  levothyroxine, 75 mcg, Oral, Daily  zinc sulfate, 220 mg, Oral, Daily    Followed by  Annamaria Blinks ON 1/6/2021] multivitamin-minerals, 1 tablet, Oral, Daily  remdesivir, 100 mg, Intravenous, Q24H    Continuous IV Infusions:  sodium chloride, 125 mL/hr, Intravenous, Continuous    PRN Meds:  acetaminophen, 650 mg, Oral, Q6H PRN x 1  albuterol, 2 puff, Inhalation, Q4H PRN  ALPRAZolam, 0 25 mg, Oral, BID PRN x 1  benzonatate, 100 mg, Oral, TID PRN x 1  Lidocaine Viscous HCl, 15 mL, Swish & Spit, 4x Daily PRN  ondansetron, 4 mg, Intravenous, Q6H PRN  X 1    Discharge Plan: TBD    Network Utilization Review Department  ATTENTION: Please call with any questions or concerns to [phone] and carefully listen to the prompts so that you are directed to the right person  All voicemails are confidential   Smitha Mercado all requests for admission clinical reviews, approved or denied determinations and any other requests to dedicated fax number below belonging to the campus where the patient is receiving treatment   List of dedicated fax numbers for the Facilities:  [address] DENIALS (Administrative/Medical Necessity) [phone]   [address] (Maternity/NICU/Pediatrics) [phone] [address] [place] [address] (Olamide Stout) [address] [place] [address] Ric[address] [place] [address] [phone]

## 2020-12-31 NOTE — ASSESSMENT & PLAN NOTE
· Symptoms started since 12/21/2020  · CTA PE study no PE there is bilateral ground-glass opacity consistent with COVID-19 pneumonia  · Patient has new O2 requirement is requiring O2 3 L nasal cannula to maintain O2 saturations  · Goal oxygen saturation 90-96%  · Inflammatory markers CRP elevated to 140s ferritin normal and D-dimer 2 6 will trend  · Continue remdesivir to total 5 days  · Continue Decadron 6 mg IV daily for 10 days as well as O2 and respiratory protocol  · Discontinue antibiotic given normal procalcitonin twice  · Initial procalcitonin normal will check a m  · Continue Proventil 90 mcg 2 puffs q 4 hours p r n  · Antipyretics p r n    · Continue on vitamin-C, vitamin D, zinc and multi Lesli per protocol  · DVT prophylaxis group C  · Should continue isolation until 01/09/2021 given oxygen requirement and and start date 12/21/2020 duration should be 20 days

## 2020-12-31 NOTE — ASSESSMENT & PLAN NOTE
· Baseline hemoglobin 11-12 currently around 9  Stable  · Check iron panel consistent with iron deficiency anemia  · Start ferrous sulfate 325 mg every other day and continue vitamin-C per COVID protocol can transition to 500 mg daily upon discharge for better iron absorption  · will continue to monitor

## 2020-12-31 NOTE — ASSESSMENT & PLAN NOTE
Oxygen saturations sat to high 80s requiring 2 L nasal cannula supplementation upon admission     Secondary to COVID-19 pneumonia being treated as per pneumonia due to COVID 19

## 2020-12-31 NOTE — PROGRESS NOTES
Progress Note - Ana Laura Henderson 1955, 60 y o  female MRN: [id number]    Unit/Bed#: 7T [facility] 710-01 Encounter: [id number]    Primary Care Provider: Leif Parrish MD   Date and time admitted to hospital: 12/29/2020  5:56 PM        * Pneumonia due to COVID-19 virus  Assessment & Plan  · Symptoms started since 12/21/2020  · CTA PE study no PE there is bilateral ground-glass opacity consistent with COVID-19 pneumonia  · Patient has new O2 requirement is requiring O2 3 L nasal cannula to maintain O2 saturations  · Goal oxygen saturation 90-96%  · Inflammatory markers CRP elevated to 140s ferritin normal and D-dimer 2 6 will trend  · Continue remdesivir to total 5 days  · Continue Decadron 6 mg IV daily for 10 days as well as O2 and respiratory protocol  · Discontinue antibiotic given normal procalcitonin twice  · Initial procalcitonin normal will check a m  · Continue Proventil 90 mcg 2 puffs q 4 hours p r n  · Antipyretics p r n  · Continue on vitamin-C, vitamin D, zinc and multi Lesli per protocol  · DVT prophylaxis group C  · Should continue isolation until 01/09/2021 given oxygen requirement and and start date 12/21/2020 duration should be 20 days    Acute respiratory failure with hypoxia (HCC)  Assessment & Plan  Oxygen saturations sat to high 80s requiring 2 L nasal cannula supplementation upon admission  Secondary to COVID-19 pneumonia being treated as per pneumonia due to COVID 19    Iron deficiency anemia  Assessment & Plan  · Baseline hemoglobin 11-12 currently around 9  Stable  · Check iron panel consistent with iron deficiency anemia  · Start ferrous sulfate 325 mg every other day and continue vitamin-C per COVID protocol can transition to 500 mg daily upon discharge for better iron absorption  · will continue to monitor  Hypothyroidism due to acquired atrophy of thyroid  Assessment & Plan  Continue pre-hospital levothyroxine 75 mcg p o   Daily    Depression with anxiety  Assessment & Plan  Continue pre-hospital Xanax 0  Two 5 mg p o  B i d  P r n  VTE Pharmacologic Prophylaxis:   Pharmacologic: Heparin  Mechanical VTE Prophylaxis in Place: Yes    Patient Centered Rounds: I have performed bedside rounds with nursing staff today  Discussions with Specialists or Other Care Team Provider:  Discussed with case management  Education and Discussions with Family / Patient:  Discussed with patient bedside and  over the phone  Time Spent for Care: 20 minutes  More than 50% of total time spent on counseling and coordination of care as described above  Current Length of Stay: 2 day(s)    Current Patient Status: Inpatient   Certification Statement: The patient will continue to require additional inpatient hospital stay due to COVID pneumonia    Discharge Plan:  Anticipate discharge home in 1-2 days  Code Status: Level 1 - Full Code      Subjective:   Patient seen and examined at bedside  No acute event overnight  Reported improvement in shortness of breath  Objective:     Vitals:   Temp (24hrs), Av 3 °F (36 3 °C), Min:96 8 °F (36 °C), Max:97 6 °F (36 4 °C)    Temp:  [96 8 °F (36 °C)-97 6 °F (36 4 °C)] 96 8 °F (36 °C)  HR:  [65-90] 65  Resp:  [19-20] 20  BP: (117-160)/(60-68) 117/65  SpO2:  [94 %-96 %] 94 %  Body mass index is 27 7 kg/m²  Input and Output Summary (last 24 hours): Intake/Output Summary (Last 24 hours) at 2020 0931  Last data filed at 2020 6066  Gross per 24 hour   Intake 2467 08 ml   Output 900 ml   Net 1567 08 ml       Physical Exam:     Physical Exam  Vitals signs reviewed  Constitutional:       General: She is not in acute distress  Appearance: Normal appearance  She is not ill-appearing  HENT:      Nose: No rhinorrhea  Eyes:      General:         Right eye: No discharge  Left eye: No discharge  Cardiovascular:      Rate and Rhythm: Normal rate and regular rhythm  Heart sounds: Normal heart sounds   No murmur  Pulmonary:      Effort: Pulmonary effort is normal  No respiratory distress  Breath sounds: Normal breath sounds  No wheezing  Abdominal:      General: Bowel sounds are normal  There is no distension  Palpations: Abdomen is soft  Neurological:      Mental Status: She is alert and oriented to person, place, and time  Psychiatric:         Behavior: Behavior normal            Additional Data:     Labs:    Results from last 7 days   Lab Units 12/31/20  0457   WBC Thousand/uL 13 40*   HEMOGLOBIN g/dL 9 6*   HEMATOCRIT % 30 3*   PLATELETS Thousands/uL 321   NEUTROS PCT % 84*   LYMPHS PCT % 12*   MONOS PCT % 4   EOS PCT % 0     Results from last 7 days   Lab Units 12/31/20  0456   SODIUM mmol/L 144   POTASSIUM mmol/L 4 0   CHLORIDE mmol/L 115*   CO2 mmol/L 24   BUN mg/dL 20   CREATININE mg/dL 0 70   ANION GAP mmol/L 5   CALCIUM mg/dL 8 1*   ALBUMIN g/dL 2 5*   TOTAL BILIRUBIN mg/dL 0 30   ALK PHOS U/L 57   ALT U/L 63*   AST U/L 66*   GLUCOSE RANDOM mg/dL 141*         Results from last 7 days   Lab Units 12/30/20  1106   POC GLUCOSE mg/dl 112         Results from last 7 days   Lab Units 12/30/20  0641 12/29/20  2043 12/29/20  2042 12/26/20  1115   LACTIC ACID mmol/L  --  0 8  --  1 3   PROCALCITONIN ng/ml 0 07  --  0 11 <0 05           * I Have Reviewed All Lab Data Listed Above  * Additional Pertinent Lab Tests Reviewed: All Labs Within Last 24 Hours Reviewed    Imaging:    Imaging Reports Reviewed Today Include:  None  Imaging Personally Reviewed by Myself Includes:  None    Recent Cultures (last 7 days):     Results from last 7 days   Lab Units 12/29/20  2041 12/26/20  1115   BLOOD CULTURE  No Growth at 24 hrs  No Growth at 24 hrs  No Growth After 4 Days  No Growth After 4 Days         Last 24 Hours Medication List:   Current Facility-Administered Medications   Medication Dose Route Frequency Provider Last Rate    acetaminophen  650 mg Oral Q6H PRN Elli Marquez PA-C      albuterol  2 puff Inhalation Q4H PRN Virgin G. L. Garcia, PA-C      ALPRAZolam  0 25 mg Oral BID PRN [place] G. L. Garcia, PA-C      ascorbic acid  1,000 mg Oral Q12H [facility] [place] G. L. Garcia, PA-C      benzonatate  100 mg Oral TID PRN Camelia Love, PA-C      bismuth subsalicylate  652 mg Oral 4x Daily [place] G. L. Garcia, PA-C      cholecalciferol  2,000 Units Oral Daily Virgin G. L. Garcia, PA-C      dexamethasone  6 mg Intravenous Q24H Virgin G. L. Garcia, PA-C      famotidine  20 mg Oral BID [place] G. L. Garcia, PA-C      ferrous sulfate  325 mg Oral Every Other Day Merritt Mart MD      fluticasone  1 spray Nasal Daily [place] G. L. Garcia, PA-TORSTEN      heparin (porcine)  5,000 Units Subcutaneous [facility] Mansfield G. L. Garcia, PA-C      levothyroxine  75 mcg Oral Daily Virgin G. L. Garcia, PA-C      Lidocaine Viscous HCl  15 mL Swish & Spit 4x Daily PRN Merritt Mart MD      zinc sulfate  220 mg Oral Daily Virgin G. L. Garcia, PA-C      Followed by   Rimma Kahn ON 1/6/2021] multivitamin-minerals  1 tablet Oral Daily Virgin G. L. Garcia, PA-C      ondansetron  4 mg Intravenous Q6H PRN Virgin G. L. Garcia, PA-TORSTEN      remdesivir  100 mg Intravenous Q24H Virgin G. L. Garcia, PA-C      sodium chloride  125 mL/hr Intravenous Continuous Virgin G. L. Garcia, PA-C 125 mL/hr (12/30/20 2633)        Today, Patient Was Seen By: Merritt Mart MD    ** Please Note: Dictation voice to text software may have been used in the creation of this document   **

## 2021-01-01 LAB
ALBUMIN SERPL BCP-MCNC: 3 G/DL (ref 3–5.2)
ALP SERPL-CCNC: 66 U/L (ref 43–122)
ALT SERPL W P-5'-P-CCNC: 66 U/L (ref 9–52)
ANION GAP SERPL CALCULATED.3IONS-SCNC: 6 MMOL/L (ref 5–14)
AST SERPL W P-5'-P-CCNC: 56 U/L (ref 14–36)
BASOPHILS # BLD AUTO: 0 THOUSANDS/ΜL (ref 0–0.1)
BASOPHILS NFR BLD AUTO: 0 % (ref 0–1)
BILIRUB SERPL-MCNC: 0.4 MG/DL
BUN SERPL-MCNC: 23 MG/DL (ref 5–25)
CALCIUM ALBUM COR SERPL-MCNC: 9.2 MG/DL (ref 8.3–10.1)
CALCIUM SERPL-MCNC: 8.4 MG/DL (ref 8.4–10.2)
CHLORIDE SERPL-SCNC: 111 MMOL/L (ref 97–108)
CO2 SERPL-SCNC: 23 MMOL/L (ref 22–30)
CREAT SERPL-MCNC: 0.73 MG/DL (ref 0.6–1.2)
CRP SERPL QL: 33.8 MG/L
EOSINOPHIL # BLD AUTO: 0 THOUSAND/ΜL (ref 0–0.4)
EOSINOPHIL NFR BLD AUTO: 0 % (ref 0–6)
ERYTHROCYTE [DISTWIDTH] IN BLOOD BY AUTOMATED COUNT: 14.8 %
GFR SERPL CREATININE-BSD FRML MDRD: 87 ML/MIN/1.73SQ M
GLUCOSE SERPL-MCNC: 142 MG/DL (ref 70–99)
HCT VFR BLD AUTO: 34.1 % (ref 36–46)
HGB BLD-MCNC: 10.6 G/DL (ref 12–16)
LYMPHOCYTES # BLD AUTO: 1.4 THOUSANDS/ΜL (ref 0.5–4)
LYMPHOCYTES NFR BLD AUTO: 10 % (ref 25–45)
MCH RBC QN AUTO: 25.2 PG (ref 26–34)
MCHC RBC AUTO-ENTMCNC: 31 G/DL (ref 31–36)
MCV RBC AUTO: 81 FL (ref 80–100)
MONOCYTES # BLD AUTO: 0.5 THOUSAND/ΜL (ref 0.2–0.9)
MONOCYTES NFR BLD AUTO: 3 % (ref 1–10)
NEUTROPHILS # BLD AUTO: 12.2 THOUSANDS/ΜL (ref 1.8–7.8)
NEUTS SEG NFR BLD AUTO: 87 % (ref 45–65)
PLATELET # BLD AUTO: 401 THOUSANDS/UL (ref 150–450)
PMV BLD AUTO: 10.2 FL (ref 8.9–12.7)
POTASSIUM SERPL-SCNC: 4.3 MMOL/L (ref 3.6–5)
PROT SERPL-MCNC: 6.3 G/DL (ref 5.9–8.4)
RBC # BLD AUTO: 4.2 MILLION/UL (ref 4–5.2)
SODIUM SERPL-SCNC: 140 MMOL/L (ref 137–147)
WBC # BLD AUTO: 14.1 THOUSAND/UL (ref 4.5–11)

## 2021-01-01 PROCEDURE — 86140 C-REACTIVE PROTEIN: CPT | Performed by: INTERNAL MEDICINE

## 2021-01-01 PROCEDURE — 85025 COMPLETE CBC W/AUTO DIFF WBC: CPT | Performed by: INTERNAL MEDICINE

## 2021-01-01 PROCEDURE — 80053 COMPREHEN METABOLIC PANEL: CPT | Performed by: INTERNAL MEDICINE

## 2021-01-01 PROCEDURE — 99232 SBSQ HOSP IP/OBS MODERATE 35: CPT | Performed by: INTERNAL MEDICINE

## 2021-01-01 RX ADMIN — DEXAMETHASONE SODIUM PHOSPHATE 6 MG: 4 INJECTION, SOLUTION INTRA-ARTICULAR; INTRALESIONAL; INTRAMUSCULAR; INTRAVENOUS; SOFT TISSUE at 21:24

## 2021-01-01 RX ADMIN — HEPARIN SODIUM 5000 UNITS: 5000 INJECTION INTRAVENOUS; SUBCUTANEOUS at 14:30

## 2021-01-01 RX ADMIN — FAMOTIDINE 20 MG: 20 TABLET ORAL at 17:12

## 2021-01-01 RX ADMIN — OXYCODONE HYDROCHLORIDE AND ACETAMINOPHEN 1000 MG: 500 TABLET ORAL at 09:06

## 2021-01-01 RX ADMIN — ZINC SULFATE 220 MG (50 MG) CAPSULE 220 MG: CAPSULE at 09:06

## 2021-01-01 RX ADMIN — BISMUTH SUBSALICYLATE 524 MG: 262 LIQUID ORAL at 09:07

## 2021-01-01 RX ADMIN — HEPARIN SODIUM 5000 UNITS: 5000 INJECTION INTRAVENOUS; SUBCUTANEOUS at 05:10

## 2021-01-01 RX ADMIN — Medication 2000 UNITS: at 09:06

## 2021-01-01 RX ADMIN — REMDESIVIR 100 MG: 100 INJECTION, POWDER, LYOPHILIZED, FOR SOLUTION INTRAVENOUS at 21:24

## 2021-01-01 RX ADMIN — BISMUTH SUBSALICYLATE 524 MG: 262 LIQUID ORAL at 21:34

## 2021-01-01 RX ADMIN — FLUTICASONE PROPIONATE 1 SPRAY: 50 SPRAY, METERED NASAL at 09:08

## 2021-01-01 RX ADMIN — HEPARIN SODIUM 5000 UNITS: 5000 INJECTION INTRAVENOUS; SUBCUTANEOUS at 21:24

## 2021-01-01 RX ADMIN — LEVOTHYROXINE SODIUM 75 MCG: 75 TABLET ORAL at 05:10

## 2021-01-01 RX ADMIN — FAMOTIDINE 20 MG: 20 TABLET ORAL at 09:06

## 2021-01-01 NOTE — PLAN OF CARE
Problem: PAIN - ADULT  Goal: Verbalizes/displays adequate comfort level or baseline comfort level  Description: Interventions:  - Encourage patient to monitor pain and request assistance  - Assess pain using appropriate pain scale  - Administer analgesics based on type and severity of pain and evaluate response  - Implement non-pharmacological measures as appropriate and evaluate response  - Consider cultural and social influences on pain and pain management  - Notify physician/advanced practitioner if interventions unsuccessful or patient reports new pain  Outcome: Progressing     Problem: INFECTION - ADULT  Goal: Absence or prevention of progression during hospitalization  Description: INTERVENTIONS:  - Assess and monitor for signs and symptoms of infection  - Monitor lab/diagnostic results  - Monitor all insertion sites, i e  indwelling lines, tubes, and drains  - Monitor endotracheal if appropriate and nasal secretions for changes in amount and color  - [place] appropriate cooling/warming therapies per order  - Administer medications as ordered  - Instruct and encourage patient and family to use good hand hygiene technique  - Identify and instruct in appropriate isolation precautions for identified infection/condition  Outcome: Progressing  Goal: Absence of fever/infection during neutropenic period  Description: INTERVENTIONS:  - Monitor WBC    Outcome: Progressing     Problem: SAFETY ADULT  Goal: Patient will remain free of falls  Description: INTERVENTIONS:  - Assess patient frequently for physical needs  -  Identify cognitive and physical deficits and behaviors that affect risk of falls    -  [place] fall precautions as indicated by assessment   - Educate patient/family on patient safety including physical limitations  - Instruct patient to call for assistance with activity based on assessment  - Modify environment to reduce risk of injury  - Consider OT/PT consult to assist with strengthening/mobility  Outcome: Progressing  Goal: Maintain or return to baseline ADL function  Description: INTERVENTIONS:  -  Assess patient's ability to carry out ADLs; assess patient's baseline for ADL function and identify physical deficits which impact ability to perform ADLs (bathing, care of mouth/teeth, toileting, grooming, dressing, etc )  - Assess/evaluate cause of self-care deficits   - Assess range of motion  - Assess patient's mobility; develop plan if impaired  - Assess patient's need for assistive devices and provide as appropriate  - Encourage maximum independence but intervene and supervise when necessary  - Involve family in performance of ADLs  - Assess for home care needs following discharge   - Consider OT consult to assist with ADL evaluation and planning for discharge  - Provide patient education as appropriate  Outcome: Progressing  Goal: Maintain or return mobility status to optimal level  Description: INTERVENTIONS:  - Assess patient's baseline mobility status (ambulation, transfers, stairs, etc )    - Identify cognitive and physical deficits and behaviors that affect mobility  - Identify mobility aids required to assist with transfers and/or ambulation (gait belt, sit-to-stand, lift, walker, cane, etc )  - [place] fall precautions as indicated by assessment  - Record patient progress and toleration of activity level on Mobility SBAR; progress patient to next Phase/Stage  - Instruct patient to call for assistance with activity based on assessment  - Consider rehabilitation consult to assist with strengthening/weightbearing, etc   Outcome: Progressing     Problem: DISCHARGE PLANNING  Goal: Discharge to home or other facility with appropriate resources  Description: INTERVENTIONS:  - Identify barriers to discharge w/patient and caregiver  - Arrange for needed discharge resources and transportation as appropriate  - Identify discharge learning needs (meds, wound care, etc )  - Arrange for interpretive services to assist at discharge as needed  - Refer to Case Management Department for coordinating discharge planning if the patient needs post-hospital services based on physician/advanced practitioner order or complex needs related to functional status, cognitive ability, or social support system  Outcome: Progressing     Problem: Knowledge Deficit  Goal: Patient/family/caregiver demonstrates understanding of disease process, treatment plan, medications, and discharge instructions  Description: Complete learning assessment and assess knowledge base    Interventions:  - Provide teaching at level of understanding  - Provide teaching via preferred learning methods  Outcome: Progressing     Problem: RESPIRATORY - ADULT  Goal: Achieves optimal ventilation and oxygenation  Description: INTERVENTIONS:  - Assess for changes in respiratory status  - Assess for changes in mentation and behavior  - Position to facilitate oxygenation and minimize respiratory effort  - Oxygen administered by appropriate delivery if ordered  - Initiate smoking cessation education as indicated  - Encourage broncho-pulmonary hygiene including cough, deep breathe, Incentive Spirometry  - Assess the need for suctioning and aspirate as needed  - Assess and instruct to report SOB or any respiratory difficulty  - Respiratory Therapy support as indicated  Outcome: Progressing     Problem: GASTROINTESTINAL - ADULT  Goal: Minimal or absence of nausea and/or vomiting  Description: INTERVENTIONS:  - Administer IV fluids if ordered to ensure adequate hydration  - Maintain NPO status until nausea and vomiting are resolved  - Nasogastric tube if ordered  - Administer ordered antiemetic medications as needed  - Provide nonpharmacologic comfort measures as appropriate  - Advance diet as tolerated, if ordered  - Consider nutrition services referral to assist patient with adequate nutrition and appropriate food choices  Outcome: Progressing  Goal: Maintains or returns to baseline bowel function  Description: INTERVENTIONS:  - Assess bowel function  - Encourage oral fluids to ensure adequate hydration  - Administer IV fluids if ordered to ensure adequate hydration  - Administer ordered medications as needed  - Encourage mobilization and activity  - Consider nutritional services referral to assist patient with adequate nutrition and appropriate food choices  Outcome: Progressing  Goal: Maintains adequate nutritional intake  Description: INTERVENTIONS:  - Monitor percentage of each meal consumed  - Identify factors contributing to decreased intake, treat as appropriate  - Assist with meals as needed  - Monitor I&O, weight, and lab values if indicated  - Obtain nutrition services referral as needed  Outcome: Progressing     Problem: Potential for Falls  Goal: Patient will remain free of falls  Description: INTERVENTIONS:  - Assess patient frequently for physical needs  -  Identify cognitive and physical deficits and behaviors that affect risk of falls    -  [place] fall precautions as indicated by assessment   - Educate patient/family on patient safety including physical limitations  - Instruct patient to call for assistance with activity based on assessment  - Modify environment to reduce risk of injury  - Consider OT/PT consult to assist with strengthening/mobility  Outcome: Progressing

## 2021-01-01 NOTE — ASSESSMENT & PLAN NOTE
· Baseline hemoglobin 11-12 currently around 9  Stable  · Check iron panel consistent with iron deficiency anemia  · Continue ferrous sulfate 325 mg every other day and continue vitamin-C per COVID protocol can transition to 500 mg daily upon discharge for better iron absorption  · will continue to monitor

## 2021-01-01 NOTE — PROGRESS NOTES
Progress Note - Austin Stafford 1955, 60 y o  female MRN: [id number]    Unit/Bed#: 7T [facility] 710-01 Encounter: [id number]    Primary Care Provider: Lorrain Scheuermann, MD   Date and time admitted to hospital: 12/29/2020  5:56 PM        * Pneumonia due to COVID-19 virus  Assessment & Plan  · Symptoms started since 12/21/2020  · CTA PE study no PE there is bilateral ground-glass opacity consistent with COVID-19 pneumonia  · Currently requiring 1 5 L nasal cannula  · Goal oxygen saturation 90-96%  · Inflammatory markers CRP elevated to 140s trending down to 30s ferritin normal and D-dimer 2 6 will trend  · Continue remdesivir to total 5 days  · Continue Decadron 6 mg IV daily for 10 days as well as O2 and respiratory protocol  · Discontinued antibiotic given normal procalcitonin twice  · Continue Proventil 90 mcg 2 puffs q 4 hours p r n  · Antipyretics p r n  · Continue on vitamin-C, vitamin D, zinc and multi Lesli per protocol  · DVT prophylaxis group C  · Should continue isolation until 01/09/2021 given oxygen requirement and and start date 12/21/2020 duration should be 20 days    Acute respiratory failure with hypoxia (HCC)  Assessment & Plan  Oxygen saturations sat to high 80s requiring 2 L nasal cannula supplementation upon admission  Secondary to COVID-19 pneumonia being treated as per pneumonia due to COVID 19  Iron deficiency anemia  Assessment & Plan  · Baseline hemoglobin 11-12 currently around 9  Stable  · Check iron panel consistent with iron deficiency anemia  · Continue ferrous sulfate 325 mg every other day and continue vitamin-C per COVID protocol can transition to 500 mg daily upon discharge for better iron absorption  · will continue to monitor  Hypothyroidism due to acquired atrophy of thyroid  Assessment & Plan  Continue pre-hospital levothyroxine 75 mcg p o  Daily    Depression with anxiety  Assessment & Plan  Continue pre-hospital Xanax 0  Two 5 mg p o  B i d  P r n        VTE Pharmacologic Prophylaxis:   Pharmacologic: Heparin  Mechanical VTE Prophylaxis in Place: No    Patient Centered Rounds: I have performed bedside rounds with nursing staff today  Discussions with Specialists or Other Care Team Provider: NITIN     Education and Discussions with Family / Patient: Discussed with patient at bedside and left voicemail to her   Time Spent for Care: 20 minutes  More than 50% of total time spent on counseling and coordination of care as described above  Current Length of Stay: 3 day(s)    Current Patient Status: Inpatient   Certification Statement: The patient will continue to require additional inpatient hospital stay due to [place] pneumoniae    Discharge Plan:  Anticipated discharge home within 2 days possibly home O2  Code Status: Level 1 - Full Code      Subjective:   Patient seen examined at bedside and  reported exertional dyspnea  Objective:     Vitals:   Temp (24hrs), Av 2 °F (36 2 °C), Min:96 8 °F (36 °C), Max:97 6 °F (36 4 °C)    Temp:  [96 8 °F (36 °C)-97 6 °F (36 4 °C)] 97 3 °F (36 3 °C)  HR:  [50-66] 50  Resp:  [16-18] 16  BP: (125-144)/(61-81) 126/61  SpO2:  [92 %-98 %] 96 %  Body mass index is 28 32 kg/m²  Input and Output Summary (last 24 hours): Intake/Output Summary (Last 24 hours) at 2021 0816  Last data filed at 2020 1800  Gross per 24 hour   Intake 2260 ml   Output 400 ml   Net 1860 ml       Physical Exam:     Physical Exam  Vitals signs reviewed  Constitutional:       General: She is not in acute distress  Appearance: She is not toxic-appearing or diaphoretic  HENT:      Nose: No rhinorrhea  Eyes:      General:         Right eye: No discharge  Left eye: No discharge  Cardiovascular:      Rate and Rhythm: Normal rate and regular rhythm  Heart sounds: Normal heart sounds  No murmur  No gallop  Pulmonary:      Effort: Pulmonary effort is normal  No respiratory distress        Breath sounds: Normal breath sounds  No wheezing  Abdominal:      General: Bowel sounds are normal  There is no distension  Palpations: Abdomen is soft  Musculoskeletal:      Right lower leg: No edema  Left lower leg: No edema  Neurological:      Mental Status: She is alert and oriented to person, place, and time  Psychiatric:         Behavior: Behavior normal            Additional Data:     Labs:    Results from last 7 days   Lab Units 01/01/21  0611   WBC Thousand/uL 14 10*   HEMOGLOBIN g/dL 10 6*   HEMATOCRIT % 34 1*   PLATELETS Thousands/uL 401   NEUTROS PCT % 87*   LYMPHS PCT % 10*   MONOS PCT % 3   EOS PCT % 0     Results from last 7 days   Lab Units 01/01/21  0611   SODIUM mmol/L 140   POTASSIUM mmol/L 4 3   CHLORIDE mmol/L 111*   CO2 mmol/L 23   BUN mg/dL 23   CREATININE mg/dL 0 73   ANION GAP mmol/L 6   CALCIUM mg/dL 8 4   ALBUMIN g/dL 3 0   TOTAL BILIRUBIN mg/dL 0 40   ALK PHOS U/L 66   ALT U/L 66*   AST U/L 56*   GLUCOSE RANDOM mg/dL 142*         Results from last 7 days   Lab Units 12/30/20  1106   POC GLUCOSE mg/dl 112         Results from last 7 days   Lab Units 12/30/20  0641 12/29/20  2043 12/29/20  2042 12/26/20  1115   LACTIC ACID mmol/L  --  0 8  --  1 3   PROCALCITONIN ng/ml 0 07  --  0 11 <0 05           * I Have Reviewed All Lab Data Listed Above  * Additional Pertinent Lab Tests Reviewed: All Labs Within Last 24 Hours Reviewed    Imaging:    Imaging Reports Reviewed Today Include:  None  Imaging Personally Reviewed by Myself Includes:  None    Recent Cultures (last 7 days):     Results from last 7 days   Lab Units 12/29/20  2041 12/26/20  1115   BLOOD CULTURE  No Growth at 48 hrs  No Growth at 48 hrs  No Growth After 5 Days  No Growth After 5 Days         Last 24 Hours Medication List:   Current Facility-Administered Medications   Medication Dose Route Frequency Provider Last Rate    acetaminophen  650 mg Oral Q6H PRN Bev Le PA-C      albuterol  2 puff Inhalation Q4H PRN Bev Le YUDY      ALPRAZolam  0 25 mg Oral BID PRN Elmer Driscoll PA-C      ascorbic acid  1,000 mg Oral Q12H [address] Elmer Driscoll PA-C      benzonatate  100 mg Oral TID PRN Camelia Love PA-C      bismuth subsalicylate  991 mg Oral 4x Daily Elmer Driscoll PA-C      cholecalciferol  2,000 Units Oral Daily Elmer Driscoll PA-C      dexamethasone  6 mg Intravenous Q24H Elmer Driscoll PA-C      famotidine  20 mg Oral BID Elmer Driscoll PA-C      ferrous sulfate  325 mg Oral Every Other Day Ema Severs, MD      fluticasone  1 spray Nasal Daily Elmer Driscoll PA-C      heparin (porcine)  5,000 Units Subcutaneous [facility] Elmer Driscoll PA-C      levothyroxine  75 mcg Oral Daily Elmer Driscoll PA-C      Lidocaine Viscous HCl  15 mL Swish & Spit 4x Daily PRN Ema Severs, MD      zinc sulfate  220 mg Oral Daily Elmer Driscoll PA-C      Followed by   Heather Marie ON 1/6/2021] multivitamin-minerals  1 tablet Oral Daily Elmer Driscoll PA-C      ondansetron  4 mg Intravenous Q6H PRN Elmer Driscoll PA-C      remdesivir  100 mg Intravenous Q24H Elmer Driscoll PA-C          Today, Patient Was Seen By: Ema Severs, MD    ** Please Note: Dictation voice to text software may have been used in the creation of this document   **

## 2021-01-01 NOTE — ASSESSMENT & PLAN NOTE
· Symptoms started since 12/21/2020  · CTA PE study no PE there is bilateral ground-glass opacity consistent with COVID-19 pneumonia  · Currently requiring 1 5 L nasal cannula  · Goal oxygen saturation 90-96%  · Inflammatory markers CRP elevated to 140s trending down to 30s ferritin normal and D-dimer 2 6 will trend  · Continue remdesivir to total 5 days  · Continue Decadron 6 mg IV daily for 10 days as well as O2 and respiratory protocol  · Discontinued antibiotic given normal procalcitonin twice  · Continue Proventil 90 mcg 2 puffs q 4 hours p r n  · Antipyretics p r n    · Continue on vitamin-C, vitamin D, zinc and multi Lesli per protocol  · DVT prophylaxis group C  · Should continue isolation until 01/09/2021 given oxygen requirement and and start date 12/21/2020 duration should be 20 days

## 2021-01-02 VITALS
HEIGHT: 59 IN | OXYGEN SATURATION: 93 % | BODY MASS INDEX: 28.04 KG/M2 | DIASTOLIC BLOOD PRESSURE: 59 MMHG | RESPIRATION RATE: 20 BRPM | WEIGHT: 139.11 LBS | HEART RATE: 65 BPM | TEMPERATURE: 98.1 F | SYSTOLIC BLOOD PRESSURE: 116 MMHG

## 2021-01-02 LAB
ALBUMIN SERPL BCP-MCNC: 2.7 G/DL (ref 3–5.2)
ALP SERPL-CCNC: 62 U/L (ref 43–122)
ALT SERPL W P-5'-P-CCNC: 78 U/L (ref 9–52)
ANION GAP SERPL CALCULATED.3IONS-SCNC: 4 MMOL/L (ref 5–14)
ANISOCYTOSIS BLD QL SMEAR: PRESENT
AST SERPL W P-5'-P-CCNC: 62 U/L (ref 14–36)
BILIRUB SERPL-MCNC: 0.5 MG/DL
BUN SERPL-MCNC: 20 MG/DL (ref 5–25)
BURR CELLS BLD QL SMEAR: PRESENT
CALCIUM ALBUM COR SERPL-MCNC: 9.1 MG/DL (ref 8.3–10.1)
CALCIUM SERPL-MCNC: 8.1 MG/DL (ref 8.4–10.2)
CHLORIDE SERPL-SCNC: 105 MMOL/L (ref 97–108)
CO2 SERPL-SCNC: 27 MMOL/L (ref 22–30)
CREAT SERPL-MCNC: 0.74 MG/DL (ref 0.6–1.2)
CRP SERPL QL: 37.4 MG/L
D DIMER PPP FEU-MCNC: 1.38 UG/ML FEU
ERYTHROCYTE [DISTWIDTH] IN BLOOD BY AUTOMATED COUNT: 14.2 %
GFR SERPL CREATININE-BSD FRML MDRD: 86 ML/MIN/1.73SQ M
GLUCOSE SERPL-MCNC: 143 MG/DL (ref 70–99)
HCT VFR BLD AUTO: 33.8 % (ref 36–46)
HGB BLD-MCNC: 10.7 G/DL (ref 12–16)
HYPERCHROMIA BLD QL SMEAR: PRESENT
LYMPHOCYTES # BLD AUTO: 1.34 THOUSAND/UL (ref 0.5–4)
LYMPHOCYTES # BLD AUTO: 11 % (ref 25–45)
MCH RBC QN AUTO: 25.2 PG (ref 26–34)
MCHC RBC AUTO-ENTMCNC: 31.7 G/DL (ref 31–36)
MCV RBC AUTO: 80 FL (ref 80–100)
METAMYELOCYTES NFR BLD MANUAL: 1 % (ref 0–1)
MICROCYTES BLD QL AUTO: PRESENT
MONOCYTES # BLD AUTO: 0.37 THOUSAND/UL (ref 0.2–0.9)
MONOCYTES NFR BLD AUTO: 3 % (ref 1–10)
NEUTS BAND NFR BLD MANUAL: 1 % (ref 0–8)
NEUTS SEG # BLD: 10.37 THOUSAND/UL (ref 1.8–7.8)
NEUTS SEG NFR BLD AUTO: 84 %
PLATELET # BLD AUTO: 437 THOUSANDS/UL (ref 150–450)
PLATELET BLD QL SMEAR: ABNORMAL
PMV BLD AUTO: 10.3 FL (ref 8.9–12.7)
POTASSIUM SERPL-SCNC: 4.3 MMOL/L (ref 3.6–5)
PROT SERPL-MCNC: 5.8 G/DL (ref 5.9–8.4)
RBC # BLD AUTO: 4.25 MILLION/UL (ref 4–5.2)
RBC MORPH BLD: ABNORMAL
SODIUM SERPL-SCNC: 136 MMOL/L (ref 137–147)
TOTAL CELLS COUNTED SPEC: 100
WBC # BLD AUTO: 12.2 THOUSAND/UL (ref 4.5–11)

## 2021-01-02 PROCEDURE — 85027 COMPLETE CBC AUTOMATED: CPT | Performed by: INTERNAL MEDICINE

## 2021-01-02 PROCEDURE — 86140 C-REACTIVE PROTEIN: CPT | Performed by: INTERNAL MEDICINE

## 2021-01-02 PROCEDURE — 80053 COMPREHEN METABOLIC PANEL: CPT | Performed by: INTERNAL MEDICINE

## 2021-01-02 PROCEDURE — 85007 BL SMEAR W/DIFF WBC COUNT: CPT | Performed by: INTERNAL MEDICINE

## 2021-01-02 PROCEDURE — 99238 HOSP IP/OBS DSCHRG MGMT 30/<: CPT | Performed by: INTERNAL MEDICINE

## 2021-01-02 PROCEDURE — 85379 FIBRIN DEGRADATION QUANT: CPT | Performed by: INTERNAL MEDICINE

## 2021-01-02 RX ORDER — BENZONATATE 100 MG/1
100 CAPSULE ORAL 3 TIMES DAILY PRN
Qty: 20 CAPSULE | Refills: 0 | Status: SHIPPED | OUTPATIENT
Start: 2021-01-02 | End: 2021-03-10 | Stop reason: ALTCHOICE

## 2021-01-02 RX ORDER — PREDNISONE 20 MG/1
40 TABLET ORAL DAILY
Qty: 12 TABLET | Refills: 0 | Status: SHIPPED | OUTPATIENT
Start: 2021-01-02 | End: 2021-01-08

## 2021-01-02 RX ORDER — FERROUS SULFATE 325(65) MG
325 TABLET ORAL EVERY OTHER DAY
Qty: 30 TABLET | Refills: 0 | Status: SHIPPED | OUTPATIENT
Start: 2021-01-04 | End: 2021-03-10 | Stop reason: ALTCHOICE

## 2021-01-02 RX ORDER — ZINC SULFATE 50(220)MG
220 CAPSULE ORAL DAILY
Qty: 3 CAPSULE | Refills: 0 | Status: SHIPPED | OUTPATIENT
Start: 2021-01-03 | End: 2021-03-10

## 2021-01-02 RX ORDER — AMOXICILLIN 250 MG
1 CAPSULE ORAL
Status: DISCONTINUED | OUTPATIENT
Start: 2021-01-02 | End: 2021-01-02 | Stop reason: HOSPADM

## 2021-01-02 RX ORDER — AMOXICILLIN 250 MG
1 CAPSULE ORAL
Qty: 30 TABLET | Refills: 0 | Status: SHIPPED | OUTPATIENT
Start: 2021-01-02 | End: 2021-03-10 | Stop reason: ALTCHOICE

## 2021-01-02 RX ADMIN — BISMUTH SUBSALICYLATE 524 MG: 262 LIQUID ORAL at 08:53

## 2021-01-02 RX ADMIN — HEPARIN SODIUM 5000 UNITS: 5000 INJECTION INTRAVENOUS; SUBCUTANEOUS at 05:06

## 2021-01-02 RX ADMIN — FERROUS SULFATE TAB 325 MG (65 MG ELEMENTAL FE) 325 MG: 325 (65 FE) TAB at 08:52

## 2021-01-02 RX ADMIN — FLUTICASONE PROPIONATE 1 SPRAY: 50 SPRAY, METERED NASAL at 08:58

## 2021-01-02 RX ADMIN — LEVOTHYROXINE SODIUM 75 MCG: 75 TABLET ORAL at 05:06

## 2021-01-02 RX ADMIN — FAMOTIDINE 20 MG: 20 TABLET ORAL at 08:52

## 2021-01-02 RX ADMIN — Medication 2000 UNITS: at 08:52

## 2021-01-02 RX ADMIN — ZINC SULFATE 220 MG (50 MG) CAPSULE 220 MG: CAPSULE at 08:52

## 2021-01-02 RX ADMIN — OXYCODONE HYDROCHLORIDE AND ACETAMINOPHEN 1000 MG: 500 TABLET ORAL at 08:52

## 2021-01-02 NOTE — ASSESSMENT & PLAN NOTE
· Symptoms started since 12/21/2020  · CTA PE study no PE there is bilateral ground-glass opacity consistent with COVID-19 pneumonia  · Currently on room air 93%  · Goal oxygen saturation 90-96%  · Inflammatory markers CRP elevated to 140s trending down to 30s ferritin normal and D-dimer 2 6 will trend  · Continue remdesivir to total 4/5 days  · Continue Decadron 6 mg IV daily for 10 days as well as O2 and respiratory protocol  · Discontinued antibiotic given normal procalcitonin twice  · Continue Proventil 90 mcg 2 puffs q 4 hours p r n  · Antipyretics p r n    · Continue on vitamin-C, vitamin D, zinc and multi Lesli per protocol  · DVT prophylaxis group C  · Should continue isolation until 01/09/2021 given oxygen requirement and and start date 12/21/2020 duration should be 20 days  · Can be discharged home today

## 2021-01-02 NOTE — DISCHARGE SUMMARY
Discharge- Elenona Serve 1955, 60 y o  female MRN: [id number]    Unit/Bed#: 7T [facility] 710-01 Encounter: [id number]    Primary Care Provider: Colin Avila MD   Date and time admitted to hospital: 12/29/2020  5:56 PM        * Pneumonia due to COVID-19 virus  Assessment & Plan  · Symptoms started since 12/21/2020  · CTA PE study no PE there is bilateral ground-glass opacity consistent with COVID-19 pneumonia  · Currently on room air 93%  · Goal oxygen saturation 90-96%  · Inflammatory markers CRP elevated to 140s trending down to 30s ferritin normal and D-dimer 2 6 will trend  · Continue remdesivir to total 4/5 days  · Continue Decadron 6 mg IV daily for 10 days as well as O2 and respiratory protocol  · Discontinued antibiotic given normal procalcitonin twice  · Continue Proventil 90 mcg 2 puffs q 4 hours p r n  · Antipyretics p r n  · Continue on vitamin-C, vitamin D, zinc and multi Lesli per protocol  · DVT prophylaxis group C  · Should continue isolation until 01/09/2021 given oxygen requirement and and start date 12/21/2020 duration should be 20 days  · Can be discharged home today    Acute respiratory failure with hypoxia ([facility] [address] )  Assessment & Plan  On presentation Oxygen saturations sat to high 80s requiring 2 L nasal cannula supplementation upon admission  Secondary to COVID-19 pneumonia being treated as per pneumonia due to COVID 19  Iron deficiency anemia  Assessment & Plan  · Baseline hemoglobin 11-12 currently around 9  Stable  · Check iron panel consistent with iron deficiency anemia  · Continue ferrous sulfate 325 mg every other day and continue vitamin-C per COVID protocol can transition to 500 mg daily upon discharge for better iron absorption  · will continue to monitor  Hypothyroidism due to acquired atrophy of thyroid  Assessment & Plan  Continue pre-hospital levothyroxine 75 mcg p o  Daily    Depression with anxiety  Assessment & Plan  Continue pre-hospital Xanax 0   Two 5 mg p o  B i d  P r n  Discharging Physician / Practitioner: Ameya Canchola MD  PCP: Citlaly Allen MD  Admission Date:   Admission Orders (From admission, onward)     Ordered        12/29/20 1929  Inpatient Admission  Once                   Discharge Date: 01/02/21    Resolved Problems  Date Reviewed: 1/2/2021    None          Consultations During Hospital Stay:  · None    Procedures Performed:   · CTA PE study  · Chest x-ray    Significant Findings / Test Results:   · No evidence for pulmonary embolism  · Bilateral groundglass opacities in a predominantly peripheral distribution  In the setting of clinically suspected/proven COVID-19, the above lung parenchymal findings on CT indicate high confidence for Covid 19 pneumonia  ·   · MID and lower lung zone peripheral airspace opacities consistent with Covid 19  Incidental Findings:   · Non     Test Results Pending at Discharge (will require follow up): · None     Outpatient Tests Requested:  · Non    Complications:  None    Reason for Admission:  COVID pneumonia    Hospital Course:     Claus Méndez is a 60 y o  female patient who originally presented to the hospital on 12/29/2020 due to shortness of breath  She was found to have COVID pneumonia treated under mild pathway required 1-2 L oxygen supplementation  She received 4 days of remdesivir and has been saturating 93% on room air overnight prior to discharge  On the day of discharge she was feeling better in terms of difficulty breathing  She requested letter for her employer which we did  She was in agreement with discharge plan and all questions answered  Please see above list of diagnoses and related plan for additional information  Condition at Discharge: good     Discharge Day Visit / Exam:     Subjective:  Patient seen examined at bedside  She reported improvement in her dyspnea    Vitals: Blood Pressure: 116/59 (01/02/21 0700)  Pulse: 65 (01/02/21 0700)  Temperature: 98 1 °F (36 7 °C) (01/02/21 0700)  Temp Source: Temporal (01/02/21 0700)  Respirations: 20 (01/02/21 0700)  Height: 4' 11" (149 9 cm) (12/29/20 2215)  Weight - Scale: 63 1 kg (139 lb 1 8 oz) (01/02/21 0600)  SpO2: 93 % (01/02/21 0700)  Exam:   Physical Exam  Vitals signs reviewed  Constitutional:       General: She is not in acute distress  Appearance: She is not ill-appearing  HENT:      Nose: No rhinorrhea  Eyes:      General:         Right eye: No discharge  Left eye: No discharge  Cardiovascular:      Rate and Rhythm: Normal rate and regular rhythm  Heart sounds: Normal heart sounds  No murmur  Pulmonary:      Effort: Pulmonary effort is normal  No respiratory distress  Breath sounds: Normal breath sounds  No wheezing  Musculoskeletal:      Right lower leg: No edema  Left lower leg: No edema  Neurological:      Mental Status: She is oriented to person, place, and time  Cranial Nerves: No cranial nerve deficit  Psychiatric:         Mood and Affect: Mood normal          Behavior: Behavior normal          Discussion with Family:  Discussed with  over the phone  Discharge instructions/Information to patient and family:   See after visit summary for information provided to patient and family  Provisions for Follow-Up Care:  See after visit summary for information related to follow-up care and any pertinent home health orders  Disposition:     Home    For Discharges to [facility] SNF:   · Not Applicable to this Patient - Not Applicable to this Patient    Planned Readmission:  No     Discharge Statement:  I spent 20 minutes discharging the patient  This time was spent on the day of discharge  I had direct contact with the patient on the day of discharge   Greater than 50% of the total time was spent examining patient, answering all patient questions, arranging and discussing plan of care with patient as well as directly providing post-discharge instructions  Additional time then spent on discharge activities  Discharge Medications:  See after visit summary for reconciled discharge medications provided to patient and family        ** Please Note: This note has been constructed using a voice recognition system **

## 2021-01-02 NOTE — DISCHARGE INSTRUCTIONS
Anemia por deficiencia de kathryn   LO QUE NECESITA SABER:   La anemia por deficiencia de kathryn consiste en niveles bajos de glóbulos rojos y hemoglobina debido a la falta de kathryn en la ishmael  El kathryn ayuda a producir hemoglobina  La hemoglobina es parte de gene glóbulos rojos y [place] a transportar el oxígeno a cheung cuerpo  Las causas más comunes son la pérdida de ishmael y el no ingerir alimentos que tengan suficiente kathryn  INSTRUCCIONES SOBRE EL DINO HOSPITALARIA:   Llame al 911 en leobardo de presentar lo siguiente:  · Usted tiene falta de aliento incluso cuando descansa  Regrese a la magen de emergencias si:  · Usted tiene evacuaciones intestinales oscuras o con ishmael  · Usted está demasiado mareado para ponerse de pie  · Usted tiene dificultad para tragar debido al dolor en cheung boca y garganta  Comuníquese con cheung médico si:  · Usted tiene [employer], estreñimiento o diarrea  · Tiene náuseas o está vomitando  · Usted está mareado o muy cansado  · Usted tiene preguntas o inquietudes acerca de cheung condición o cuidado  Medicamentos: Es posible que usted necesite alguno de los siguientes:  · Suplementos de kathryn o ácido fólico ayudan a elevar los niveles de hemoglobina y glóbulos rojos  · Los ablandadores de evacuaciones se podrían necesitar si los suplementos de kathryn causan estreñimiento  · [place] gene medicamentos jamie se le haya indicado  Consulte con cheung médico si usted lauren que cheung medicamento no le está ayudando o si presenta efectos secundarios  Infórmele si es alérgico a algún medicamento  Mantenga indio lista actualizada de los [place], las vitaminas y los productos herbales que chris  Incluya los siguientes datos de los medicamentos: cantidad, frecuencia y motivo de administración  Traiga con usted la lista o los envases de las píldoras a gene citas de seguimiento  Lleve la lista de los medicamentos con usted en leobardo de indio emergencia      Consuma alimentos ricos en kathryn y proteínas: Alimentos altos en kathryn y proteína son por ejemplo los sabra secos, cordell, verduras de hojas verdes oscuras y frijoles  No tome café, té u otros líquidos que contengan cafeína  Limite el consumo de [place] a 2 tazas al día  Es posible que necesite consultar a un nutricionista para crear un plan de alimentación adecuado para usted  [place]y líquidos según gene indicaciones: Los líquidos [facility] a prevenir el estreñimiento  Pregunte cuánto líquido debe michael cada día y cuáles líquidos son los más adecuados para usted  Acuda a gene consultas de control con cheung médico según le indicaron  Anote gene preguntas para que se acuerde de hacerlas eusebio gene visitas  © [facility] [address] Information is for End User's use only and may not be sold, redistributed or otherwise used for commercial purposes  All illustrations and images included in CareNotes® are the copyrighted property of Skai D A Vyopta  or [address] [place] [address] es sólo para uso en educación  Cheung intención no es darle un consejo médico sobre enfermedades o tratamientos  Colsulte con cheung [place] farmacéutico antes de seguir cualquier régimen médico para saber si es seguro y efectivo para usted  Ansiedad   LO QUE NECESITA SABER:   La ansiedad es indio condición que provoca que usted sienta preocupación o miedo  El estrés familiar o laboral, fumar, la cafeína y el alcohol pueden aumentar cheung riesgo para sufrir ansiedad  Ciertos medicamentos o condiciones de [address] pueden aumentar cheung riesgo  La ansiedad podría comenzar gradualmente y puede convertirse en indio condición a franklin plazo si no se controla o se trata  INSTRUCCIONES SOBRE EL DINO HOSPITALARIA:   Llame al 911 si presenta:   · Usted tiene dolor de pecho, presión o pesadez que pueden llegar a propagarse a gene hombros, brazos, mandíbula, edel o espalda    · Usted siente deseos de lastimarse o lastimar a alguien más    Busque atención médica de inmediato si:   · Se siente mareado, aturdido o que se va a desmayar  Pregúntele a cheung Viviana Lento vitaminas y minerales son adecuados para usted  · Anjelica síntomas empeoran o no mejoran con el tratamiento  · Usted piensa que cheung medicamento podría estar provocándole efectos secundarios  · Cheung ansiedad rochelle que realice anjelica actividades diarias  · Tiene nuevos síntomas desde cheung última consulta  · Usted tiene preguntas o inquietudes acerca de cheung condición o cuidado  Medicamentos:   · Medicamentos  para ayudarle a sentirse más calmado y relajado y disminuir anjelica síntomas  · [place] anjelica medicamentos jamie se le haya indicado  Llame a cheung médico si usted piensa que el medicamento no está ayudando o si tiene efectos secundarios  Infórmele si es alérgico a cualquier medicamento  Mantenga indio lista actualizada de los [place], las vitaminas y los productos herbales que chris  Incluya los siguientes datos de los medicamentos: cantidad, frecuencia y motivo de administración  Traiga con usted la lista o los envases de la píldoras a anjelica citas de seguimiento  Lleve la lista de los medicamentos con usted en leobardo de indio emergencia  Programe indio ariella con cheung médico dentro de 2 semanas o jamie se le indique:  Anote anjelica preguntas para que se acuerde de hacerlas eusebio anjelica visitas  Maneje la ansiedad:   · Vaya a terapia jamie se le indique  La terapia cognitiva conceptual puede ayudarlo a entender y cambiar la forma que usted reacciona a los eventos que provocan anjelica síntomas  · Busque maneras de controlar anjelica síntomas  Las [employer] jamie el ejercicio, meditación o escuchar música pueden ayudarlo a relajarse  · Practique la respiración profunda  La respiración puede cambiar la forma jamie reacciona cheung cuerpo al estrés  Enfóquese en respirar lento y profundo varias veces al día, o eusebio un ataque de ansiedad  Respire por la nariz y exhale por la boca  · No fume  La nicotina puede aumentar cheung ansiedad   No use cigarrillos electrónicos o tabaco sin humo en vez de cigarrillos o para tratar de dejar de fumar  Todos estos aún contienen nicotina  Pida a urias médico información si usted fuma actualmente y [place] para dejar de hacerlo  · No consuma cafeína  La cafeína puede empeorar gene síntomas  No consuma alimentos o bebidas que tengan el propósito de aumentar urias nivel de [place]  · Limite o no consuma bebidas alcohólicas  Pregúntele a urias médico si usted puede michael alcohol  Es posible que usted no pueda ingerir alcohol si chris ciertos medicamentos para la ansiedad o la depresión  Limite el consumo de alcohol a 1 trago por día si es irma  Si usted es hombre, limite el consumo de alcohol a 2 tragos por día  Un trago equivale a 12 onzas de cerveza, 5 onzas de vino o 1 onza y ½ de licor  © 2016 [address] is for End User's use only and may not be sold, redistributed or otherwise used for commercial purposes  All illustrations and images included in CareNotes® are the copyrighted property of A D A M , Inc  or Erick Kim  Esta información es sólo para uso en educación  Urias intención no es darle un consejo médico sobre enfermedades o tratamientos  Colsulte con urias Kermitt Console farmacéutico antes de seguir cualquier régimen médico para saber si es seguro y efectivo para usted  COVID-19 (Enfermedad por coronavirus 2019)   LO QUE NECESITA SABER:   COVID-19 es la enfermedad causada por el nuevo coronavirus descubierto por primera vez en diciembre de 2019  Los coronavirus generalmente causan infecciones de las vías respiratorias superiores (nariz, garganta y pulmones), jamie un resfriado  El nuevo virus también puede causar afecciones respiratorias inferiores graves, jamie la neumonía o el síndrome de dificultad respiratoria aguda (SDRA)  Cualquier persona puede desarrollar problemas graves a causa del nuevo virus, carlota el riesgo es mayor si tiene 72 años o [place]   Un sistema inmunitario débil, la diabetes o indio enfermedad cardíaca o pulmonar también pueden aumentar el riesgo  INSTRUCCIONES SOBRE EL DINO HOSPITALARIA:   Si lauren que usted o alguien que conoce puede estar infectado: [place] lo siguiente para proteger a otras personas:  · Si se requiere atención de emergencia, avise al operador de la posible infección, o llame antes y avise al servicio de urgencias  · Llame a un médico para recibir instrucciones si los síntomas son leves  Cualquier persona que pueda estar infectada no  debe llegar sin llamar avis  El médico deberá proteger a los miembros del personal y a otros pacientes  · La persona que puede estar infectada debe usar un tapabocas mientras reciben [place]  [place] ayudará a reducir el riesgo de infectar a otras personas  Nadie que sea charli de 2 años, que tenga problemas respiratorios o que no pueda quitárselo debe usar un tapabocas  El médico puede darle instrucciones para cualquier persona que no pueda usar un tapabocas  Llame al [facility] local de emergencias (911 en los [place]) o pídale a alguien que lo lleve al departamento de emergencias si:  · Usted tiene dificultad para respirar o falta de aliento mientras descansa  · Usted siente presión o dolor en el pecho que dura más de 5 minutos  · Usted tiene confusión o es difícil despertarlo  · Anjelica labios o nataliia están azules  · Usted tiene fiebre de 104 ºF (40 °C) o más  Discharge Anticoagulation Plan:     While hospitalized, patient did not have confirmed or highly suspected VTE/PE, and D-dimer was < 2 5    Patient confirmed to have no other indication for therapeutic anticoagulation  Patient will need follow up with primary care provider (or specialist) within 72 hours of hospital discharge, and frequently thereafter to monitor for signs of worsening respiratory function, VTE and/or bleeding                     Llame a cheung médico si:  · No  tiene síntomas de COVID-19 carlota tuvo contacto físico cercano dentro de los 14 días con alguien que stefanie positivo  · Usted tiene preguntas o inquietudes acerca de cheung condición o cuidado  Medicamentos: Es posible que usted necesite alguno de los siguientes:  · Los descongestionantes ayudan a reducir la congestión nasal y [place] Divine a respirar más fácilmente  Si chris pastillas descongestionantes, pueden causarle agitación o problemas para dormir  No use aerosol descongestionante por más de unos cuantos días  · Los jarabes para la tos ayudan a reducir la tos  Pregúntele a cheung médico cuál tipo de medicamento para la tos es mejor para usted  · Acetaminofén kelli el dolor y baja la fiebre  Está disponible sin receta médica  Pregunte la cantidad y la frecuencia con que debe tomarlos  [address]  Virginie las etiquetas de todos los demás medicamentos que esté usando para saber si también contienen acetaminofén, o pregunte a cheung médico o farmacéutico  El acetaminofén puede causar daño en el hígado cuando no se chris de forma correcta  No use más de 4 gramos (4000 miligramos) en total de acetaminofeno en un día  · Los [place], jamie el ibuprofeno, Jacques Divine a disminuir la inflamación, el dolor y la [place]  Los JENNA pueden causar sangrado estomacal o problemas renales en ciertas personas  Si usted chris un medicamento anticoagulante, siempre pregúntele a cheung médico si los JENNA son seguros para usted  Siempre virginie la etiqueta de juanpablo medicamento y [place] instrucciones  · [place] gene medicamentos jamie se le haya indicado  Consulte con cheung médico si usted lauren que cheung medicamento no le está ayudando o si presenta efectos secundarios  Infórmele si es alérgico a cualquier medicamento  Mantenga indio lista actualizada de los [place], las vitaminas y los productos herbales que chris  Incluya los siguientes datos de los medicamentos: cantidad, frecuencia y motivo de administración  Traiga con usted la lista o los envases de las píldoras a gene citas de seguimiento  Lleve la lista de los medicamentos con usted en leobardo de shannon emergencia  Cómo se propaga el coronavirus 2019: El virus se propaga rápida y fácilmente  Puede infectarse si está en contacto con shannon gran cantidad del virus, incluso eusebio poco tiempo  También puede infectarse por estar cerca de shannon pequeña cantidad del virus eusebio mucho tiempo  A continuación se indican las formas en que se lauren que se propaga el virus, carlota es posible que surja más información:  · Las gotitas son la forma más común de propagación de todos los coronavirus  El virus puede viajar en gotitas que se arabella cuando shannon persona habla, tose o estornuda  Cualquiera que respire las gotitas o que las gotitas se le metan en los ojos puede infectarse con el virus  Se lauren que el contacto personal cercano con shannon persona infectada es la principal forma de propagación del virus  El contacto personal cercano significa estar a menos de 6 pies (2 metros) de otra persona  · El contacto de persona a persona puede propagar el virus  Por ejemplo, shannon persona con el virus en gene marcela puede propagarlo al darle la mano a alguien  En juanpablo momento, no parece que el virus pueda transmitirse a un bebé eusebio el embarazo o [address]  El bebé puede infectarse después de nacer por contacto de persona a persona  El virus tampoco parece propagarse por la [place]  Si está embarazada o amamantando, hable con cheung médico u obstetra sobre cualquier preocupación que tenga  · El virus puede permanecer en objetos y superficies  Shannon persona puede contraer el virus en gene marcela al tocar el objeto o la superficie  La infección se produce si la persona se toca los ojos o la boca sin antes lavarse las marcela  Aún no se sabe cuánto tiempo puede permanecer el virus en un objeto o superficie  Por eso es importante limpiar todas las superficies que se usan regularmente  · Un animal infectado puede ser [place] de infectar a shannon persona que lo toque   [place] puede ocurrir en mercados vivos o en indio kita  Cómo todo el monika puede reducir el riesgo de COVID-19: La mejor manera de prevenir la infección es evitar a cualquiera que esté infectado, carlota esto puede ser difícil de lograr  Indio persona infectada puede propagar el virus antes de que aparezcan los signos o síntomas, o incluso si los signos o síntomas nunca se desarrollan  Lo siguiente puede ayudar a reducir el riesgo de infección:      · [employer] las marcela con frecuencia eusebio el día  Utilice agua y [place]  Frótese las marcela enjabonadas, [facility] dedos  Lávese el frente y el dorso de cada [place], y [place] dedos  Use los dedos de indio mano para restregar debajo de las uñas de la [place]  Lávese eusebio al menos 20 segundos  Enjuague con agua corriente caliente eusebio varios segundos  Luego séquese las marcela con indio toalla limpia o indio toalla de papel  Puede usar un desinfectante para marcela que contenga alcohol, si no hay agua y jabón disponibles  No se toque los ojos, la nariz o la boca sin antes [employer]  Enseñe a los niños a lavarse las marcela y a usar el desinfectante de [address]  · Cúbrase al toser o estornudar  [place] rochelle que las gotitas viajen de usted a los demás  Gire la nataliia y cúbrase la boca y la [facility] con un pañuelo  Deseche el pañuelo  Use el ángulo del brazo si no tiene un pañuelo disponible  Luego lávese las marcela con agua y [place] o use un desinfectante de marcela  Gire la sidney y cúbrase si está cerca de alguien que está estornudando o tosiendo  Enséñeles a los niños a cubrirse al toser o estornudar  · [address] distanciamiento social a nivel local, nacional y [place]  El distanciamiento social significa que las personas evitan el contacto físico cercano para que el virus no se propague de Zofia persona a otra  Mantenga al menos 6 pies (2 metros) de distancia entre usted y los demás   También mantenga esta distancia de cualquiera que venga a cheung casa, jamie alguien que aliza Zofai entrega  · Acostúmbrese a no tocarse la nataliia  No se sabe cuánto tiempo puede permanecer el virus en los objetos y las superficies  Si tiene el virus [employer], puede transferirlo a los ojos, la nariz o la boca e infectarse  También puede transferirlo a los objetos, las [address]  Tenga cuidado con lo que toca [employer]  Por ejemplo, los pasamanos y botones de ascensor  Intente no tocar nada con las marcela descubiertas a menos que sea necesario  [employer] las marcela antes de salir de cheung casa y cuando regresa  · Limpie y desinfecte a menudo los objetos y las superficies de alto contacto  Use indio solución o toallitas desinfectantes  Puede hacer indio solución diluyendo 4 cucharaditas de lejía en 1 cuarto de galón (4 tazas) de agua  Limpie y desinfecte aunque piense que nadie que viva o haya entrado en cheung casa esté infectado con el virus  Puede limpiar los objetos con un paño desinfectante antes de llevarlos a cheung casa  [employer] las marcela después de manipular cualquier cosa que traiga a cheung casa  · Aliza que cheung sistema inmunitario esté lo más saludable posible  Un sistema inmunitario debilitado lo hace más vulnerable al nuevo coronavirus  No hay ninguna vacuna contra la COVID-19 disponible todavía  Las vacunas, jamie la vacuna contra la gripe y la neumonía, pueden ayudar al sistema inmunitario  Cheung médico le indicará qué vacunas debe recibir y cuándo aplicárselas  Mantenga cheung sistema inmunitario lo más arthur posible  No fume  Consuma alimentos saludables, aliza ejercicio regularmente e intente controlar el estrés  Acuéstese y levántese a la misma hora todos los días  Pautas de distanciamiento social: Las normas de distanciamiento social nacionales y locales varían  Las reglas pueden cambiar con el tiempo a medida que se levantan las restricciones  Las restricciones pueden volver a aplicarse si se produce un brote en el lugar donde usted vive   Es importante conocer y [address] distanciamiento social actuales en cheung área  Las siguientes son reglas generales al respecto:  · Limite los viajes fuera de cheung casa  Es posible que se le entreguen alimentos, medicinas y otros suministros  Si es posible, aliza que dejen los [employer] entregan en cheung gelacio o en [place]  Intente que nadie le entregue un [employer]  Estará tan cerca de la persona que el virus puede propagarse entre ustedes  · No tenga contacto físico cercano con nadie que no viva en cheung casa  No le dé la mano, abrace o bese a indio persona jamie saludo  Párese o camine lo más lejos posible de los demás  Si tiene que usar el transporte público ([address]), intente sentarse o pararse lejos de los demás  Puede mantenerse conectado de forma major con los demás a través de llamadas telefónicas, mensajes de correo electrónico, sitios web de [employer] y videochats  Verifique cómo están las personas que pueden tener dificultades para distanciarse socialmente, o que viven solas  Pregunte a los administradores de los asilos de ancianos o de las instalaciones de cuidados a franklin plazo cómo puede comunicarse con seguridad con alguien que vive allí  · Use un tapabocas de kacie cuando esté cerca de otras personas que no viven en cheung casa  Los tapabocas ayudan evitar que el virus se propague a otras personas en las gotitas  Puede usar un tapabocas transparente si alguien necesita leer gene labios  Linda es un tapabocas con un plástico sobre el área de la boca para que se puedan rosanne los labios  No utilice tapabocas que tengan válvulas de respiración o respiraderos  El virus puede salir por la válvula o el respiradero y contagiar a otros  No se quite el tapabocas para hablar, toser o estornudar  No utilice tapabocas en niños menores de 2 años ni en personas que tengan problemas respiratorios o no puedan quitárselo  · Permita que solo los profesionales médicos u otros profesionales ingresen a cheung casa   Use el tapabocas y recuérdeles a los profesionales que usen un tapabocas  Recuérdeles que se laven las marcela cuando lleguen y antes de irse  No  deje entrar a nadie que no viva en cheung casa, aunque no esté enfermo  Indio persona puede contagiar el virus a otros antes de que comiencen los síntomas de COVID-19  Algunas personas ni siquiera desarrollan síntomas  Los niños suelen tener síntomas leves o ningún síntoma  Puede ser difícil decirle a un isabela que no lo abrace ni lo bese  Explíquele que así es jamie puede ayudarlo a mantenerse saludable  · No vaya a la casa de [place] persona, a menos que sea necesario  No vaya de visita, aunque la persona esté shanna  Vaya solo si necesita ayudarla  Asegúrese de que ambos usen un tapabocas mientras esté allí  · Evite las grandes reuniones y las multitudes  Las reuniones o multitudes de 10 o más individuos pueden hacer que el virus se propague  Por ejemplo, las reuniones incluyen fiestas, eventos deportivos, servicios religiosos y conferencias  Se pueden formar multitudes en [address], los parques y las atracciones turísticas  Protéjase manteniéndose alejado de las grandes reuniones y multitudes  · Pregunte a cheung médico de qué otra forma [address] citas  Es posible que pueda tener citas sin tener que ir al consultorio del médico  Algunos médicos ofrecen citas por teléfono, video u otros tipos de citas  También puede obtener recetas de gene medicamentos para varios meses de indio vez  · Manténgase a monica si debe que salir a trabajar  Es posible que tenga un trabajo que solo se puede hacer fuera de cheung casa  Mantenga la distancia física entre usted y los demás trabajadores tanto jamie sea posible  Siga las reglas de cheung empleador para que todos estén a monica  Si tiene COVID-19 y se está recuperando en casa: Los [employer] darán instrucciones específicas que debe seguir   Pat Brice son pautas generales para recordarle cómo mantener a los demás a monica hasta que usted esté manoj:  · Nain las marcela frecuentemente  Use agua y jabón tanto jamie sea posible  Puede usar un desinfectante para marcela que contenga alcohol, si no hay agua y jabón disponibles  No comparta toallas con nadie  Si [place] toallas de papel, deséchelas en un cubo de basura recubierto que se guarda en cheung habitación o área  Use un cubo de basura cubierto, si es posible  · No salga de cheung casa a menos que sea necesario  Es posible que tenga que ir al [place] de cheung médico para hacerse chequeos o para resurtir indio Chancy Limes  No llegue al consultorio del médico sin indio ariella  Los médicos tienen que hacer que gene consultorios rhina seguros para el personal y [de-identified] pacientes  · No entre en contacto físico cercano con nadie, monica que sea necesario  Solo tenga un contacto físico cercano con indio persona que lo cuide directamente, o con un bebé o isabela que deba cuidar  Los miembros de la marce y los amigos no deben visitarlo  Si es posible, quédese en un área o habitación separada de cheung casa si vive con [employer]  Nadie debe entrar en el área o en la habitación excepto para brindarle cuidados  Puede visitar a los demás por teléfono, videochat, correo electrónico o sistemas similares  Es importante mantenerse conectado con los demás en cheung aida mientras se recupera  · Use un tapabocas mientras haya otras personas cerca de usted  [place] puede ayudar a [employer] propaguen el virus cuando usted [address] [place], estornuda o tose  Póngase el tapabocas antes de que la persona entre en cheung habitación o [place]  Recuérdele a la persona que se cubra la nariz y la boca antes de entrar a brindarle cuidados  · No comparta artículos  No comparta platos, toallas ni otros artículos con nadie  Los artículos deben ser lavados después de usarlos  · Nikhil [place] a cheung bebé  Lávese las marcela con agua y jabón con frecuencia eusebio todo el día  Use un tapabocas mientras amamanta o mientras se extrae o se saca la [address]   Si es posible, pídale a alguien que esté manoj que cuide de urias bebé  Puede poner la Kathi en biberones para que la persona la use, si es necesario  Hable con urias médico si tiene preguntas o inquietudes acerca de cómo cuidar o vincularse con urias bebé  También le dirá cuándo debe traer a urias bebé para los chequeos y las vacunas  También le dirá qué hacer si lauren que urias bebé está infectado con el nuevo virus  · No manipule animales vivos  Hasta que se sepa más, es mejor no tocar, jugar o manipular animales vivos  Algunos animales, incluyendo las [place], kapadia sido infectados con el nuevo coronavirus  No manipule ni cuide animales hasta que esté manoj  El cuidado incluye alimentar, acariciar y Rudine Minor a urias mascota  No deje que urias mascota lo lama o comparta urias comida  Pídale a alguien que no esté infectado que cuide de [address], si es posible  Si debe cuidar a [employer], [place] un tapabocas  Lávese las marcela antes y después de cuidar a urias mascota  · Siga las instrucciones de urias médico para estar cerca de los demás después de recuperarse  Deberá esperar al menos 10 días después de la aparición de los síntomas  Entonces deberá pasar 24 horas sin fiebre sin recibir medicamentos para la fiebre, y sin otros síntomas  La pérdida del sentido del gusto o el olfato puede continuar eusebio varios meses  Se considera que está manoj estar cerca de otros si juanpablo es el único síntoma  No se sabe con certeza si indio persona recuperada puede transmitir el virus a otros, ni por cuánto tiempo  Urias médico puede darle instrucciones, jamie continuar con el distanciamiento social o usar un tapabocas cuando esté cerca de otras personas  Cómo cuidar de alguien que tiene COVID-19: Si la persona vive en otro hogar, coordine un tiempo para brindar cuidados  Recuerde llevar algunos pares de guantes desechables y un tapabocas   He Rives son las pautas generales para ayudarle a cuidar de forma major a cualquier persona que tenga COVID-19:  · Connor Milton frecuentemente  Lávese antes y después de entrar en la casa, área o habitación de la persona  [address] papel en un cubo de basura recubierto que tenga indio tapa, si es posible  · No permita que otros se acerquen a la persona  Nadie debe ingresar a la casa de la persona a menos que sea necesario  De ser posible, la persona debe estar en un área o habitación separada si vive con [employer]  Mantenga la gelacio de la habitación cerrada a menos que necesite entrar o salir  [place]key que otras personas llamen, charlen por video o envíen un correo electrónico a la persona si se siente lo suficientemente manoj  La persona puede sentirse shanna si se la mantiene separada eusebio un franklin período de [place]  La comunicación major puede ayudar a esta persona a mantenerse en contacto con cheung marce y amigos  · Asegúrese de que la habitación de la persona tenga un buen flujo de [place]  Puede abrir la ventana si el clima lo permite  También se puede encender el aire acondicionado para ayudar a que el aire se [place]  · Comuníquese con la persona antes de entrar para brindarle cuidados  Asegúrese de que la persona use un tapabocas  Recuérdele que se lave las marcela con agua y [place]  Puede usar un desinfectante para marcela que contenga alcohol, si no hay agua y jabón disponibles  Colóquese el tapabocas antes de entrar al lugar a brindar cuidados  · Use guantes mientras sivakumar cuidados y limpia  Limpie los [employer] persona [place] a menudo  Limpie las encimeras, las superficies de cocción y los frentes y el interior del microondas y el refrigerador  Limpie la ducha, el sanitario, el área alrededor del sanitario, el lavabo, el área alrededor del lavabo y los grifos  Junte la ropa sucia o la ropa de Geoff [place] y seque los artículos con el agua más caliente que permita la kacie  Lave los platos y utensilios usados en [place] o en un lavavajillas      · Todo lo que deseche debe ir a un cubo de basura recubierto  Cuando necesite vaciar la basura, cierre el extremo abierto de la [place] y [place]  [place] ayuda a evitar que los artículos en los que está el virus se salgan de la basura  Quítese los guantes y deséchelos  Lávese las [place]  Acuda a la consulta de control con cheung médico según las indicaciones: Anote gene preguntas para que se acuerde de hacerlas eusebio gene visitas  Para más información:  · Centers for Disease Control and Prevention  [address] , [address]  Phone: 6- 142 [phone]  Web Address: Point.io    © [facility] [address] Information is for End User's use only and may not be sold, redistributed or otherwise used for commercial purposes  All illustrations and images included in CareNotes® are the copyrighted property of [employer] [facility]  or [address] [place] [address] es sólo para uso en educación  Cheung intención no es darle un consejo médico sobre enfermedades o tratamientos  Colsulte con cheung Darian Chill farmacéutico antes de seguir cualquier régimen médico para saber si es seguro y efectivo para usted

## 2021-01-02 NOTE — NURSING NOTE
Patient discharged to home, IV removed  Patient given discharge instructions with stated understanding  Patient left unit via w/c with belongings in stable condition accompanied by PCA

## 2021-01-02 NOTE — ASSESSMENT & PLAN NOTE
On presentation Oxygen saturations sat to high 80s requiring 2 L nasal cannula supplementation upon admission  Secondary to COVID-19 pneumonia being treated as per pneumonia due to COVID 19

## 2021-01-02 NOTE — PLAN OF CARE
Problem: PAIN - ADULT  Goal: Verbalizes/displays adequate comfort level or baseline comfort level  Description: Interventions:  - Encourage patient to monitor pain and request assistance  - Assess pain using appropriate pain scale  - Administer analgesics based on type and severity of pain and evaluate response  - Implement non-pharmacological measures as appropriate and evaluate response  - Consider cultural and social influences on pain and pain management  - Notify physician/advanced practitioner if interventions unsuccessful or patient reports new pain  Outcome: Progressing     Problem: INFECTION - ADULT  Goal: Absence or prevention of progression during hospitalization  Description: INTERVENTIONS:  - Assess and monitor for signs and symptoms of infection  - Monitor lab/diagnostic results  - Monitor all insertion sites, i e  indwelling lines, tubes, and drains  - Monitor endotracheal if appropriate and nasal secretions for changes in amount and color  - [place] appropriate cooling/warming therapies per order  - Administer medications as ordered  - Instruct and encourage patient and family to use good hand hygiene technique  - Identify and instruct in appropriate isolation precautions for identified infection/condition  Outcome: Progressing  Goal: Absence of fever/infection during neutropenic period  Description: INTERVENTIONS:  - Monitor WBC    Outcome: Progressing     Problem: SAFETY ADULT  Goal: Patient will remain free of falls  Description: INTERVENTIONS:  - Assess patient frequently for physical needs  -  Identify cognitive and physical deficits and behaviors that affect risk of falls    -  [place] fall precautions as indicated by assessment   - Educate patient/family on patient safety including physical limitations  - Instruct patient to call for assistance with activity based on assessment  - Modify environment to reduce risk of injury  - Consider OT/PT consult to assist with strengthening/mobility  Outcome: Progressing  Goal: Maintain or return to baseline ADL function  Description: INTERVENTIONS:  -  Assess patient's ability to carry out ADLs; assess patient's baseline for ADL function and identify physical deficits which impact ability to perform ADLs (bathing, care of mouth/teeth, toileting, grooming, dressing, etc )  - Assess/evaluate cause of self-care deficits   - Assess range of motion  - Assess patient's mobility    Outcome: Progressing  Goal: Maintain or return mobility status to optimal level  Description: INTERVENTIONS:  - Assess patient's baseline mobility status (ambulation, transfers, stairs, etc )    - Identify cognitive and physical deficits and behaviors that affect mobility  - Identify mobility aids required to assist with transfers and/or ambulation (gait belt, sit-to-stand, lift, walker, cane, etc )  - [place] fall precautions as indicated by assessment  - Record patient progress and toleration of activity level on Mobility SBAR; progress patient to next Phase/Stage  - Instruct patient to call for assistance with activity based on assessment  - Consider rehabilitation consult to assist with strengthening/weightbearing, etc   Outcome: Progressing     Problem: DISCHARGE PLANNING  Goal: Discharge to home or other facility with appropriate resources  Description: INTERVENTIONS:  - Identify barriers to discharge w/patient and caregiver  - Arrange for needed discharge resources and transportation as appropriate  - Identify discharge learning needs (meds, wound care, etc )  - Arrange for interpretive services to assist at discharge as needed  - Refer to Case Management Department for coordinating discharge planning if the patient needs post-hospital services based on physician/advanced practitioner order or complex needs related to functional status, cognitive ability, or social support system  Outcome: Progressing     Problem: Knowledge Deficit  Goal: Patient/family/caregiver demonstrates understanding of disease process, treatment plan, medications, and discharge instructions  Description: Complete learning assessment and assess knowledge base    Interventions:  - Provide teaching at level of understanding  - Provide teaching via preferred learning methods  Outcome: Progressing     Problem: RESPIRATORY - ADULT  Goal: Achieves optimal ventilation and oxygenation  Description: INTERVENTIONS:  - Assess for changes in respiratory status  - Assess for changes in mentation and behavior  - Position to facilitate oxygenation and minimize respiratory effort  - Oxygen administered by appropriate delivery if ordered  - Initiate smoking cessation education as indicated  - Encourage broncho-pulmonary hygiene including cough, deep breathe, Incentive Spirometry  - Assess the need for suctioning and aspirate as needed  - Assess and instruct to report SOB or any respiratory difficulty  - Respiratory Therapy support as indicated  Outcome: Progressing     Problem: GASTROINTESTINAL - ADULT  Goal: Minimal or absence of nausea and/or vomiting  Description: INTERVENTIONS:  - Administer IV fluids if ordered to ensure adequate hydration  - Maintain NPO status until nausea and vomiting are resolved  - Nasogastric tube if ordered  - Administer ordered antiemetic medications as needed  - Provide nonpharmacologic comfort measures as appropriate  - Advance diet as tolerated, if ordered  - Consider nutrition services referral to assist patient with adequate nutrition and appropriate food choices  Outcome: Progressing  Goal: Maintains or returns to baseline bowel function  Description: INTERVENTIONS:  - Assess bowel function  - Encourage oral fluids to ensure adequate hydration  - Administer IV fluids if ordered to ensure adequate hydration  - Administer ordered medications as needed  - Encourage mobilization and activity  - Consider nutritional services referral to assist patient with adequate nutrition and appropriate food choices  Outcome: Progressing  Goal: Maintains adequate nutritional intake  Description: INTERVENTIONS:  - Monitor percentage of each meal consumed  - Identify factors contributing to decreased intake, treat as appropriate  - Assist with meals as needed  - Monitor I&O, weight, and lab values if indicated  - Obtain nutrition services referral as needed  Outcome: Progressing     Problem: Potential for Falls  Goal: Patient will remain free of falls  Description: INTERVENTIONS:  - Assess patient frequently for physical needs  -  Identify cognitive and physical deficits and behaviors that affect risk of falls    -  [place] fall precautions as indicated by assessment   - Educate patient/family on patient safety including physical limitations  - Instruct patient to call for assistance with activity based on assessment  - Modify environment to reduce risk of injury  - Consider OT/PT consult to assist with strengthening/mobility  Outcome: Progressing

## 2021-01-04 ENCOUNTER — TRANSITIONAL CARE MANAGEMENT (OUTPATIENT)
Dept: INTERNAL MEDICINE CLINIC | Facility: CLINIC | Age: 66
End: 2021-01-04

## 2021-01-04 ENCOUNTER — TELEMEDICINE (OUTPATIENT)
Dept: INTERNAL MEDICINE CLINIC | Facility: CLINIC | Age: 66
End: 2021-01-04
Payer: COMMERCIAL

## 2021-01-04 DIAGNOSIS — U07.1 COVID-19 VIRUS INFECTION: ICD-10-CM

## 2021-01-04 DIAGNOSIS — E03.9 ACQUIRED HYPOTHYROIDISM: Primary | ICD-10-CM

## 2021-01-04 DIAGNOSIS — E78.00 PURE HYPERCHOLESTEROLEMIA: ICD-10-CM

## 2021-01-04 DIAGNOSIS — I45.4: ICD-10-CM

## 2021-01-04 DIAGNOSIS — U07.1 PNEUMONIA DUE TO COVID-19 VIRUS: ICD-10-CM

## 2021-01-04 DIAGNOSIS — F41.8 DEPRESSION WITH ANXIETY: ICD-10-CM

## 2021-01-04 DIAGNOSIS — J12.82 PNEUMONIA DUE TO COVID-19 VIRUS: ICD-10-CM

## 2021-01-04 LAB
BACTERIA BLD CULT: NORMAL
BACTERIA BLD CULT: NORMAL

## 2021-01-04 PROCEDURE — 1111F DSCHRG MED/CURRENT MED MERGE: CPT | Performed by: INTERNAL MEDICINE

## 2021-01-04 PROCEDURE — 99496 TRANSJ CARE MGMT HIGH F2F 7D: CPT | Performed by: INTERNAL MEDICINE

## 2021-01-04 NOTE — PROGRESS NOTES
Assessment/Plan:  COVID-19 pneumonia 14/14 days hospital recommended to be isolated till January 9th  CT scan angiogram PE study showed bilateral ground-glass opacity consistent with COVID-19 pneumonia  Pulse ox 93 on room air  Oxygen saturation 90-96  CPR elevated the inflammatory markers  Was treated with remdesivir 5 days  Was given Decadron 6 mg IV daily for 10 days as well as oxygen    Recommended continue Proventil inhalers  Vitamin-C vitamin-D  And  Zinc   Continue isolation until January 9th  I saw her today she has looks much better on video she is a little nervous she did not get the prednisone from the pharmacy that was recommended I told her to have 1 of the family members go and get the prednisone was recommended 20 mg 2 tablets until January 8  Told her to start treatment immediately    She still has some fatigue but feeling better no GI symptoms less cough no shortness of breath she overall improved  Hypothyroidism continue levothyroxine    Depression anxiety is emotional support given    Labs review  Will schedule a video visit on Thursday January 7    Results for orders placed or performed during the hospital encounter of 12/29/20   Blood culture    Specimen: Arm, Left; Blood   Result Value Ref Range    Blood Culture No Growth After 5 Days  Blood culture    Specimen: Hand, Left; Blood   Result Value Ref Range    Blood Culture No Growth After 5 Days      CBC and differential   Result Value Ref Range    WBC 10 10 4 50 - 11 00 Thousand/uL    RBC 3 78 (L) 4 00 - 5 20 Million/uL    Hemoglobin 9 8 (L) 12 0 - 16 0 g/dL    Hematocrit 29 8 (L) 36 0 - 46 0 %    MCV 79 (L) 80 - 100 fL    MCH 25 9 (L) 26 0 - 34 0 pg    MCHC 32 8 31 0 - 36 0 g/dL    RDW 14 2 <15 3 %    MPV 9 4 8 9 - 12 7 fL    Platelets [phone] - 781 Thousands/uL    Neutrophils Relative 80 (H) 45 - 65 %    Lymphocytes Relative 12 (L) 25 - 45 %    Monocytes Relative 8 1 - 10 %    Eosinophils Relative 0 0 - 6 %    Basophils Relative 0 0 - 1 %    Neutrophils Absolute 8 00 (H) 1 80 - 7 80 Thousands/µL    Lymphocytes Absolute 1 20 0 50 - 4 00 Thousands/µL    Monocytes Absolute 0 80 0 20 - 0 90 Thousand/µL    Eosinophils Absolute 0 00 0 00 - 0 40 Thousand/µL    Basophils Absolute 0 00 0 00 - 0 10 Thousands/µL   Comprehensive metabolic panel   Result Value Ref Range    Sodium 137 137 - 147 mmol/L    Potassium 3 7 3 6 - 5 0 mmol/L    Chloride 101 97 - 108 mmol/L    CO2 31 (H) 22 - 30 mmol/L    ANION GAP 5 5 - 14 mmol/L    BUN 12 5 - 25 mg/dL    Creatinine 0 89 0 60 - 1 20 mg/dL    Glucose 118 (H) 70 - 99 mg/dL    Calcium 7 9 (L) 8 4 - 10 2 mg/dL    Corrected Calcium 8 5 8 3 - 10 1 mg/dL     (H) 14 - 36 U/L    ALT 71 (H) 9 - 52 U/L    Alkaline Phosphatase 59 43 - 122 U/L    Total Protein 6 5 5 9 - 8 4 g/dL    Albumin 3 2 3 0 - 5 2 g/dL    Total Bilirubin 0 70 <1 30 mg/dL    eGFR 69 >60 ml/min/1 73sq m   Troponin I   Result Value Ref Range    Troponin I <0 01 0 00 - 0 03 ng/mL   Lipase   Result Value Ref Range    Lipase 161 23 - 300 u/L   Procalcitonin with AM Reflex   Result Value Ref Range    Procalcitonin 0 11 <=0 25 ng/ml   C-reactive protein   Result Value Ref Range     7 (H) <10 0 mg/L   Ferritin   Result Value Ref Range    Ferritin 322 8 - 388 ng/mL   NT-BNP PRO   Result Value Ref Range    NT-proBNP 420 (H) 0 - 299 pg/mL   D-dimer, quantitative   Result Value Ref Range    D-Dimer, Quant 2 60 (H) <0 50 ug/ml FEU   CK (with reflex to MB)   Result Value Ref Range    Total  (H) 30 - 135 U/L   Lactic acid, plasma   Result Value Ref Range    LACTIC ACID 0 8 0 7 - 2 0 mmol/L   CKMB   Result Value Ref Range    CK-MB Index <1 0 0 0 - 2 5 %    CK-MB 0 8 0 0 - 2 4 ng/mL   UA w Reflex to Microscopic w Reflex to Culture    Specimen: Urine, Clean Catch   Result Value Ref Range    Color, UA Straw Straw, Yellow, Pale Yellow    Clarity, UA Clear Clear, Other    Specific [place], UA 1 010 1 003 - 1 040    pH, UA 5 0 4 5, 5 0, 5 5, 6  0, 6 5, 7 0, 7 5, 8 0    Leukocytes, UA Negative Negative    Nitrite, UA Negative Negative    Protein, UA 30 (1+) (A) Negative mg/dl    Glucose, UA Negative Negative mg/dl    Ketones, UA Negative Negative mg/dl    Bilirubin, UA Negative Negative    Blood, UA 10 0 (A) Negative    UROBILINOGEN UA Negative 1 0, Negative mg/dL   Procalcitonin Reflex   Result Value Ref Range    Procalcitonin 0 07 <=0 25 ng/ml   CBC and differential   Result Value Ref Range    WBC 7 90 4 50 - 11 00 Thousand/uL    RBC 3 92 (L) 4 00 - 5 20 Million/uL    Hemoglobin 10 1 (L) 12 0 - 16 0 g/dL    Hematocrit 31 7 (L) 36 0 - 46 0 %    MCV 81 80 - 100 fL    MCH 25 7 (L) 26 0 - 34 0 pg    MCHC 31 8 31 0 - 36 0 g/dL    RDW 14 5 <15 3 %    MPV 10 0 8 9 - 12 7 fL    Platelets [phone] 868 Thousands/uL    Neutrophils Relative 85 (H) 45 - 65 %    Lymphocytes Relative 12 (L) 25 - 45 %    Monocytes Relative 3 1 - 10 %    Eosinophils Relative 0 0 - 6 %    Basophils Relative 0 0 - 1 %    Neutrophils Absolute 6 70 1 80 - 7 80 Thousands/µL    Lymphocytes Absolute 0 90 0 50 - 4 00 Thousands/µL    Monocytes Absolute 0 30 0 20 - 0 90 Thousand/µL    Eosinophils Absolute 0 00 0 00 - 0 40 Thousand/µL    Basophils Absolute 0 00 0 00 - 0 10 Thousands/µL   Comprehensive metabolic panel   Result Value Ref Range    Sodium 141 137 - 147 mmol/L    Potassium 4 4 3 6 - 5 0 mmol/L    Chloride 109 (H) 97 - 108 mmol/L    CO2 27 22 - 30 mmol/L    ANION GAP 5 5 - 14 mmol/L    BUN 12 5 - 25 mg/dL    Creatinine 0 68 0 60 - 1 20 mg/dL    Glucose 146 (H) 70 - 99 mg/dL    Calcium 7 8 (L) 8 4 - 10 2 mg/dL    Corrected Calcium 8 7 8 3 - 10 1 mg/dL    AST 91 (H) 14 - 36 U/L    ALT 61 (H) 9 - 52 U/L    Alkaline Phosphatase 55 43 - 122 U/L    Total Protein 6 2 5 9 - 8 4 g/dL    Albumin 2 9 (L) 3 0 - 5 2 g/dL    Total Bilirubin 0 50 <1 30 mg/dL    eGFR 93 >60 ml/min/1 73sq m   CK (with reflex to MB)   Result Value Ref Range    Total  (H) 30 - 135 U/L   Urine Microscopic   Result Value Ref Range    RBC, UA 0-1 None Seen, 0-1, 1-2, 2-4, 0-5 /hpf    WBC, UA None Seen None Seen, 0-1, 1-2, 0-5, 2-4 /hpf    Epithelial Cells Occasional None Seen, Occasional /hpf    Bacteria, UA None Seen None Seen, Occasional /hpf   CKMB   Result Value Ref Range    CK-MB Index <1 0 0 0 - 2 5 %    CK-MB 1 4 0 0 - 2 4 ng/mL   Iron Saturation %   Result Value Ref Range    Iron Saturation 8 %    TIBC 261 250 - 450 ug/dL    Iron 22 (L) 50 - 170 ug/dL   Ferritin   Result Value Ref Range    Ferritin 286 8 - 388 ng/mL   Comprehensive metabolic panel   Result Value Ref Range    Sodium 144 137 - 147 mmol/L    Potassium 4 0 3 6 - 5 0 mmol/L    Chloride 115 (H) 97 - 108 mmol/L    CO2 24 22 - 30 mmol/L    ANION GAP 5 5 - 14 mmol/L    BUN 20 5 - 25 mg/dL    Creatinine 0 70 0 60 - 1 20 mg/dL    Glucose 141 (H) 70 - 99 mg/dL    Calcium 8 1 (L) 8 4 - 10 2 mg/dL    Corrected Calcium 9 3 8 3 - 10 1 mg/dL    AST 66 (H) 14 - 36 U/L    ALT 63 (H) 9 - 52 U/L    Alkaline Phosphatase 57 43 - 122 U/L    Total Protein 5 6 (L) 5 9 - 8 4 g/dL    Albumin 2 5 (L) 3 0 - 5 2 g/dL    Total Bilirubin 0 30 <1 30 mg/dL    eGFR 92 >60 ml/min/1 73sq m   CBC and differential   Result Value Ref Range    WBC 13 40 (H) 4 50 - 11 00 Thousand/uL    RBC 3 76 (L) 4 00 - 5 20 Million/uL    Hemoglobin 9 6 (L) 12 0 - 16 0 g/dL    Hematocrit 30 3 (L) 36 0 - 46 0 %    MCV 81 80 - 100 fL    MCH 25 6 (L) 26 0 - 34 0 pg    MCHC 31 7 31 0 - 36 0 g/dL    RDW 14 7 <15 3 %    MPV 10 0 8 9 - 12 7 fL    Platelets 125 385 - 972 Thousands/uL    Neutrophils Relative 84 (H) 45 - 65 %    Lymphocytes Relative 12 (L) 25 - 45 %    Monocytes Relative 4 1 - 10 %    Eosinophils Relative 0 0 - 6 %    Basophils Relative 0 0 - 1 %    Neutrophils Absolute 11 20 (H) 1 80 - 7 80 Thousands/µL    Lymphocytes Absolute 1 70 0 50 - 4 00 Thousands/µL    Monocytes Absolute 0 50 0 20 - 0 90 Thousand/µL    Eosinophils Absolute 0 00 0 00 - 0 40 Thousand/µL    Basophils Absolute 0 00 0 00 - 0 10 Thousands/µL   C-reactive protein   Result Value Ref Range    CRP 79 4 (H) <10 0 mg/L   Comprehensive metabolic panel   Result Value Ref Range    Sodium 140 137 - 147 mmol/L    Potassium 4 3 3 6 - 5 0 mmol/L    Chloride 111 (H) 97 - 108 mmol/L    CO2 23 22 - 30 mmol/L    ANION GAP 6 5 - 14 mmol/L    BUN 23 5 - 25 mg/dL    Creatinine 0 73 0 60 - 1 20 mg/dL    Glucose 142 (H) 70 - 99 mg/dL    Calcium 8 4 8 4 - 10 2 mg/dL    Corrected Calcium 9 2 8 3 - 10 1 mg/dL    AST 56 (H) 14 - 36 U/L    ALT 66 (H) 9 - 52 U/L    Alkaline Phosphatase 66 43 - 122 U/L    Total Protein 6 3 5 9 - 8 4 g/dL    Albumin 3 0 3 0 - 5 2 g/dL    Total Bilirubin 0 40 <1 30 mg/dL    eGFR 87 >60 ml/min/1 73sq m   CBC and differential   Result Value Ref Range    WBC 14 10 (H) 4 50 - 11 00 Thousand/uL    RBC 4 20 4 00 - 5 20 Million/uL    Hemoglobin 10 6 (L) 12 0 - 16 0 g/dL    Hematocrit 34 1 (L) 36 0 - 46 0 %    MCV 81 80 - 100 fL    MCH 25 2 (L) 26 0 - 34 0 pg    MCHC 31 0 31 0 - 36 0 g/dL    RDW 14 8 <15 3 %    MPV 10 2 8 9 - 12 7 fL    Platelets 112 343 - 030 Thousands/uL    Neutrophils Relative 87 (H) 45 - 65 %    Lymphocytes Relative 10 (L) 25 - 45 %    Monocytes Relative 3 1 - 10 %    Eosinophils Relative 0 0 - 6 %    Basophils Relative 0 0 - 1 %    Neutrophils Absolute 12 20 (H) 1 80 - 7 80 Thousands/µL    Lymphocytes Absolute 1 40 0 50 - 4 00 Thousands/µL    Monocytes Absolute 0 50 0 20 - 0 90 Thousand/µL    Eosinophils Absolute 0 00 0 00 - 0 40 Thousand/µL    Basophils Absolute 0 00 0 00 - 0 10 Thousands/µL   C-reactive protein   Result Value Ref Range    CRP 33 8 (H) <10 0 mg/L   Comprehensive metabolic panel   Result Value Ref Range    Sodium 136 (L) 137 - 147 mmol/L    Potassium 4 3 3 6 - 5 0 mmol/L    Chloride 105 97 - 108 mmol/L    CO2 27 22 - 30 mmol/L    ANION GAP 4 (L) 5 - 14 mmol/L    BUN 20 5 - 25 mg/dL    Creatinine 0 74 0 60 - 1 20 mg/dL    Glucose 143 (H) 70 - 99 mg/dL    Calcium 8 1 (L) 8 4 - 10 2 mg/dL    Corrected Calcium 9 1 8 3 - 10 1 mg/dL    AST 62 (H) 14 - 36 U/L    ALT 78 (H) 9 - 52 U/L    Alkaline Phosphatase 62 43 - 122 U/L    Total Protein 5 8 (L) 5 9 - 8 4 g/dL    Albumin 2 7 (L) 3 0 - 5 2 g/dL    Total Bilirubin 0 50 <1 30 mg/dL    eGFR 86 >60 ml/min/1 73sq m   CBC and differential   Result Value Ref Range    WBC 12 20 (H) 4 50 - 11 00 Thousand/uL    RBC 4 25 4 00 - 5 20 Million/uL    Hemoglobin 10 7 (L) 12 0 - 16 0 g/dL    Hematocrit 33 8 (L) 36 0 - 46 0 %    MCV 80 80 - 100 fL    MCH 25 2 (L) 26 0 - 34 0 pg    MCHC 31 7 31 0 - 36 0 g/dL    RDW 14 2 <15 3 %    MPV 10 3 8 9 - 12 7 fL    Platelets [phone] - 624 Thousands/uL   D-dimer, quantitative   Result Value Ref Range    D-Dimer, Quant 1 38 (H) <0 50 ug/ml FEU   C-reactive protein   Result Value Ref Range    CRP 37 4 (H) <10 0 mg/L   ECG 12 lead   Result Value Ref Range    Ventricular Rate 86 BPM    Atrial Rate 86 BPM    AL Interval 154 ms    QRSD Interval 124 ms    QT Interval 390 ms    QTC Interval 466 ms    P Axis 46 degrees    QRS Axis -22 degrees    T Wave Axis 59 degrees   ABO/Rh   Result Value Ref Range    ABO Grouping B     Rh Factor Positive    Fingerstick Glucose (POCT)   Result Value Ref Range    POC Glucose 112 65 - 140 mg/dl   Smear Review(Phlebs Do Not Order)   Result Value Ref Range    RBC Morphology abnormal     Hypochromia Present     Microcytes Present     Platelet Estimate Adequate Adequate   Manual Differential (Non Wam)   Result Value Ref Range    Segmented % 84 (H) 45 - 65 %    Bands % 1 0 - 8 %    Lymphocytes % 11 (L) 25 - 45 %    Monocytes % 3 1 - 10 %    Metamyelocytes% 1 0 - 1 %    Neutrophils Absolute 10 37 (H) 1 80 - 7 80 Thousand/uL    Lymphocytes Absolute 1 34 0 50 - 4 00 Thousand/uL    Monocytes Absolute 0 37 0 20 - 0 90 Thousand/uL    Total Counted 100     RBC Morphology abnormal     Anisocytosis Present     [place] Cells Present     Hypochromia Present     Microcytes Present     Platelet Estimate Increased (A) Adequate               Problem List Items Addressed This Visit        Endocrine    Hypothyroidism - Primary       Respiratory    Pneumonia due to COVID-19 virus       Cardiovascular and Mediastinum    Chronic bundle branch block       Other    Depression with anxiety    Hyperlipidemia    COVID-19 virus infection             Reason for visit is hospitalize with COVID-19 pneumonia from December 29 to January 2nd 2021   Encounter provider Lorrain Scheuermann, MD       Provider located at [address]      Recent Visits  Date Type Provider Dept   12/29/20 Telephone Lorrain Scheuermann, MD [facility] [place]   12/28/20 [facility], MD [facility] [place]   Showing recent visits within past 7 days and meeting all other requirements     Future Appointments  No visits were found meeting these conditions  Showing future appointments within next 150 days and meeting all other requirements        After connecting through [employer], the patient was identified by name and date of birth  Austin Stafford was informed that this is a telemedicine visit and that the visit is being conducted through [employer] and patient was informed that this is not a secure, HIPAA-compliant platform  She agrees to proceed     My office door was closed  No one else was in the room  She acknowledged consent and understanding of privacy and security of the video platform  The patient has agreed to participate and understands they can discontinue the visit at any time  Patient is aware this is a billable service  Subjective:     Patient ID: Austin Stafford is a 60 y o  female  HPI    Review of Systems   Constitutional: Positive for fatigue  Negative for activity change, appetite change, fever and unexpected weight change     HENT: Negative for congestion, ear pain, hearing loss, mouth sores, postnasal drip, rhinorrhea, sore throat, trouble swallowing and voice change  Eyes: Negative for pain, redness and visual disturbance  Respiratory: Negative for cough, chest tightness, shortness of breath and wheezing  Cardiovascular: Negative for chest pain, palpitations and leg swelling  Gastrointestinal: Negative for abdominal distention, abdominal pain, blood in stool, constipation, diarrhea and nausea  Endocrine: Negative for cold intolerance, heat intolerance, polydipsia, polyphagia and polyuria  Genitourinary: Negative for difficulty urinating, dysuria, flank pain, frequency, hematuria and urgency  Musculoskeletal: Negative for arthralgias, back pain, gait problem, joint swelling and myalgias  Skin: Negative for color change and pallor  Neurological: Negative for dizziness, tremors, seizures, syncope, weakness, numbness and headaches  Hematological: Negative for adenopathy  Does not bruise/bleed easily  Psychiatric/Behavioral: Negative  Negative for sleep disturbance  The patient is not nervous/anxious  Objective: There were no vitals filed for this visit  Physical Exam  Constitutional:       General: She is not in acute distress  Appearance: She is not ill-appearing, toxic-appearing or diaphoretic  HENT:      Head: Normocephalic  Eyes:      Pupils: Pupils are equal, round, and reactive to light  Pulmonary:      Effort: Pulmonary effort is normal  No respiratory distress  Skin:     Findings: No rash  Neurological:      Mental Status: She is alert  Transitional Care Management Review:  Sue Jimenez is a 60 y o  female here for TCM follow up       During the TCM phone call patient stated:    TCM Call (since 12/4/2020)     Date and time call was made  1/4/2021  9:44 AM    Patient was hospitialized at  [facility]        Date of Admission  12/29/20    Date of discharge  01/02/21    Diagnosis  pneumonia due to [address]    Were the patients medications reviewed and updated  Yes TCM Call (since 12/4/2020)     Should patient be enrolled in anticoag monitoring? No    Scheduled for follow up? Yes    I have advised the patient to call PCP with any new or worsening symptoms  Rony Solorzano MA          I spent 20 minutes with the patient during this visit      Viv Quick MD

## 2021-01-04 NOTE — UTILIZATION REVIEW
Notification of Discharge  This is a Notification of Discharge from our facility [address]  Please be advised that this patient has been discharge from our facility  Below you will find the admission and discharge date and time including the patients disposition  PRESENTATION DATE: 12/29/2020  5:56 PM  OBS ADMISSION DATE:   IP ADMISSION DATE: 12/29/20 1929   DISCHARGE DATE: 1/2/2021 11:22 AM  DISPOSITION: Home/Self Care Home/Self Care   Admission Orders listed below:  Admission Orders (From admission, onward)     Ordered        12/29/20 1929  Inpatient Admission  Once                   Please contact the UR Department if additional information is required to close this patient's authorization/case  [facility]  Util[facility] Review Department  Main: [phone] x carefully listen to the prompts  All voicemails are confidential   [email or web address]  Send all requests for admission clinical reviews, approved or denied determinations and any other requests to dedicated fax number below belonging to the campus where the patient is receiving treatment   List of dedicated fax numbers:  [address] DENIALS (Administrative/Medical Necessity) [phone]   [address] (Maternity/NICU/Pediatrics) [phone]   [facility] [phone]   Lupis Howell [phone]   Hannah Rosado [phone]   [facility] [address] [place] [address] [phone]   [facility]  [phone]   [address] [facility] [address]  [address] [facility] [address] [facility] [phone]

## 2021-01-05 NOTE — UTILIZATION REVIEW
Continued Stay Review    Date: 1/1-1/2/21                   Patient Class: Inpatient Level of Care: Med Surg    HPI:65 y o  female initially admitted on 12/29/20 for treatment of acute respiratory failure with hypoxia secondary to  COVID 19 pneumonia  Initially required 2-3 L supplemental oxygen  Placed on Remdesivir and Decadron  1/1 Internal Medicine: Patient reports exertional dyspnea  Currently requiring 1 5 L nasal cannula oxygen  Inflammatory markers trending down  Continue Remdesivir and dexamethasone  Anticipate possible D/C on home O2      1/2 Internal Medicine: Patient on room air overnight, saturating 93 %  Reported feeling better in terms of difficulty breathing  Discharged to home/self care  Pertinent Labs/Diagnostic Results:     12/29 CXR: MID and lower lung zone peripheral airspace opacities consistent with Covid 19        Results from last 7 days   Lab Units 01/02/21  0524 01/01/21  0611 12/31/20  0457 12/30/20  0641 12/29/20  1833   WBC Thousand/uL 12 20* 14 10* 13 40* 7 90 10 10   HEMOGLOBIN g/dL 10 7* 10 6* 9 6* 10 1* 9 8*   HEMATOCRIT % 33 8* 34 1* 30 3* 31 7* 29 8*   PLATELETS Thousands/uL 437 401 321 275 257   NEUTROS ABS Thousands/µL  --  12 20* 11 20* 6 70 8 00*   TOTAL NEUT ABS Thousand/uL 10 37*  --   --   --   --    BANDS PCT % 1  --   --   --   --          Results from last 7 days   Lab Units 01/02/21  0524 01/01/21  0611 12/31/20  0456 12/30/20  0641 12/29/20  1833   SODIUM mmol/L 136* 140 144 141 137   POTASSIUM mmol/L 4 3 4 3 4 0 4 4 3 7   CHLORIDE mmol/L 105 111* 115* 109* 101   CO2 mmol/L 27 23 24 27 31*   ANION GAP mmol/L 4* 6 5 5 5   BUN mg/dL 20 23 20 12 12   CREATININE mg/dL 0 74 0 73 0 70 0 68 0 89   EGFR ml/min/1 73sq m 86 87 92 93 69   CALCIUM mg/dL 8 1* 8 4 8 1* 7 8* 7 9*     Results from last 7 days   Lab Units 01/02/21  0524 01/01/21  0611 12/31/20  0456 12/30/20  0641 12/29/20  1833   AST U/L 62* 56* 66* 91* 126*   ALT U/L 78* 66* 63* 61* 71*   ALK PHOS U/L 62 66 57 55 59   TOTAL PROTEIN g/dL 5 8* 6 3 5 6* 6 2 6 5   ALBUMIN g/dL 2 7* 3 0 2 5* 2 9* 3 2   TOTAL BILIRUBIN mg/dL 0 50 0 40 0 30 0 50 0 70     Results from last 7 days   Lab Units 12/30/20  1106   POC GLUCOSE mg/dl 112     Results from last 7 days   Lab Units 01/02/21  0524 01/01/21  0611 12/31/20  0456 12/30/20  0641 12/29/20  1833   GLUCOSE RANDOM mg/dL 143* 142* 141* 146* 118*       Results from last 7 days   Lab Units 12/30/20  0641 12/29/20  1833   CK TOTAL U/L 394* 408*   CK MB INDEX % <1 0 <1 0   CK MB ng/mL 1 4 0 8     Results from last 7 days   Lab Units 12/29/20  1833   TROPONIN I ng/mL <0 01     Results from last 7 days   Lab Units 01/02/21  0524 12/29/20  2042   D-DIMER QUANTITATIVE ug/ml FEU 1 38* 2 60*             Results from last 7 days   Lab Units 12/30/20  0641 12/29/20  2042   PROCALCITONIN ng/ml 0 07 0 11     Results from last 7 days   Lab Units 12/29/20  2043   LACTIC ACID mmol/L 0 8             Results from last 7 days   Lab Units 12/29/20  1833   NT-PRO BNP pg/mL 420*     Results from last 7 days   Lab Units 12/30/20  0641 12/29/20  2042   FERRITIN ng/mL 286 322         Results from last 7 days   Lab Units 12/29/20  1833   LIPASE u/L 161     Results from last 7 days   Lab Units 01/02/21  0524 01/01/21  0611 12/31/20  0456 12/29/20  1833   CRP mg/L 37 4* 33 8* 79 4* 143 7*         Results from last 7 days   Lab Units 12/30/20  0440   CLARITY UA  Clear   COLOR UA  Straw   SPEC GRAV UA  1 010   PH UA  5 0   GLUCOSE UA mg/dl Negative   KETONES UA mg/dl Negative   BLOOD UA  10 0*   PROTEIN UA mg/dl 30 (1+)*   NITRITE UA  Negative   BILIRUBIN UA  Negative   UROBILINOGEN UA mg/dL Negative   LEUKOCYTES UA  Negative   WBC UA /hpf None Seen   RBC UA /hpf 0-1   BACTERIA UA /hpf None Seen   EPITHELIAL CELLS WET PREP /hpf Occasional             Results from last 7 days   Lab Units 12/29/20 2041   BLOOD CULTURE  No Growth After 5 Days  No Growth After 5 Days       Results from last 7 days   Lab Units 01/02/21  0524   TOTAL COUNTED  100           Vital Signs:       Date/Time  Temp  Pulse  Resp  BP  MAP  SpO2  Calculated FIO2 (%) - Nasal Cannula  Nasal Cannula O2 Flow Rate (L/min)  O2 Device   01/02/21 0700  98 1 °F (36 7 °C)  65  20  116/59    93 %  28  2 L/min  Nasal cannula   01/02/21 0300            93 %      None (Room air)   01/01/21 2325  98 3 °F (36 8 °C)  65  22  137/65    94 %      None (Room air)   01/01/21 1900            92 %      None (Room air)   01/01/21 1500  97 3 °F (36 3 °C)Abnormal   68  18  118/63  85  94 %      None (Room air)   01/01/21 1100            94 %      None (Room air)   01/01/21 0900            96 %  20  0 L/min  None (Room air)   01/01/21 0700  97 3 °F (36 3 °C)Abnormal   50Abnormal   16  126/61    96 %  26  1 5 L/min  Nasal cannula   01/01/21 0605            98 %  26  1 5 L/min   Nasal cannula   Nasal Cannula O2 Flow Rate (L/min):  And Proning at 01/01/21 0605   01/01/21 0300            92 %  26  1 5 L/min  Nasal cannula   12/31/20 2326  96 8 °F (36 °C)Abnormal   66  18  144/81    93 %  26  1 5 L/min  Nasal cannula   12/31/20 1900            95 %  26  1 5 L/min  Nasal cannula   12/31/20 1800            95 %       Nasal cannula   Nasal Cannula O2 Flow Rate (L/min): 1 5 L/ min at 12/31/20 1800   12/31/20 1600            95 %       Nasal cannula   Nasal Cannula O2 Flow Rate (L/min): 1 5 L /min at 12/31/20 1600   12/31/20 1523  97 6 °F (36 4 °C)  58  18  125/66  89  97 %  28  2 L/min  Nasal cannula   12/31/20 1400            96 %       Nasal cannula   Nasal Cannula O2 Flow Rate (L/min): 1 5 L /min at 12/31/20 1400   12/31/20 1200            95 %       Nasal cannula   Nasal Cannula O2 Flow Rate (L/min): 1 5 L/min at 12/31/20 1200   12/31/20 1100            94 %       Nasal cannula   Nasal Cannula O2 Flow Rate (L/min): 1 L/min at 12/31/20 1100   12/31/20 0900            94 %       Nasal cannula   Nasal Cannula O2 Flow Rate (L/min): 1 L/min at 12/31/20 0900   12/31/20 0735  96 8 °F (36 °C)Abnormal   65  20  117/65    94 %  26  1 5 L/min  Nasal cannula           Medications:   Scheduled Medications:    Last 24 Hours Medication List:          Medication Dose Route Frequency    acetaminophen  650 mg Oral Q6H PRN    albuterol  2 puff Inhalation Q4H PRN    ALPRAZolam  0 25 mg Oral BID PRN    ascorbic acid  1,000 mg Oral Q12H [address]    benzonatate  100 mg Oral TID PRN    bismuth subsalicylate  069 mg Oral 4x Daily    cholecalciferol  2,000 Units Oral Daily    dexamethasone  6 mg Intravenous Q24H    famotidine  20 mg Oral BID    ferrous sulfate  325 mg Oral Every Other Day    fluticasone  1 spray Nasal Daily    heparin (porcine)  5,000 Units Subcutaneous Q8H [address]    levothyroxine  75 mcg Oral Daily    Lidocaine Viscous HCl  15 mL Swish & Spit 4x Daily PRN    zinc sulfate  220 mg Oral Daily        [START ON 1/6/2021] multivitamin-minerals  1 tablet Oral Daily    ondansetron  4 mg Intravenous Q6H PRN    remdesivir  100 mg Intravenous Q24H          dexamethasone (DECADRON) injection 6 mg   Dose: 6 mg  Freq: Every 24 hours Route: IV  Start: 12/29/20 1945 End: 01/02/21 1323      remdesivir (Veklury) 200 mg in sodium chloride 0 9 % 250 mL IVPB   Dose: 200 mg  Freq: Every 24 hours Route: IV  Indications of Use: INFECTION CAUSED BY 2019 NOVEL CORONAVIRUS  Last Dose: Stopped (12/29/20 2200)  Start: 12/29/20 2015 End: 12/29/20 2200    Followed by  remdesivir Ras Maurisio) 100 mg in sodium chloride 0 9 % 250 mL IVPB   Dose: 100 mg  Freq: Every 24 hours Route: IV  Indications of Use: INFECTION CAUSED BY 2019 NOVEL CORONAVIRUS  Start: 12/30/20 2015 End: 01/02/21 1323    =================================================================================        Continued Stay Review    Date: 12/31/20                          Current Patient Class: IP  Current Level of Care: MedSurmarilyn    HPI:65 y o  female initially admitted on  12/29 to IP due to Acute Respiratory Failure with Hypoxia and Pneumonia due to COVID-19  Assessment/Plan:  Continues with SINGH & rest but improving  Weaning O2 as tolerated - currently on 1L  Continue IV Remdesivir & Decadron  DC'd abx as normal procalcitonin x 2  Cont Vit C,D , zinc and sq heparin   Start ferrous sulfate 325 mg every other day for anemia  and continue vitamin-C per COVID protocol can transition to 500 mg daily upon discharge for better iron absorption    Pertinent Labs/Diagnostic Results:     Lab Units 12/31/20  0457 12/30/20  0641 12/29/20  1833   WBC Thousand/uL 13 40* 7 90 10 10   HEMOGLOBIN g/dL 9 6* 10 1* 9 8*   HEMATOCRIT % 30 3* 31 7* 29 8*   PLATELETS Thousands/uL 321 275 257   NEUTROS ABS Thousands/µL 11 20* 6 70 8 00*     Lab Units 12/31/20  0456 12/30/20  0641 12/29/20  1833   SODIUM mmol/L 144 141 137   POTASSIUM mmol/L 4 0 4 4 3 7   CHLORIDE mmol/L 115* 109* 101   CO2 mmol/L 24 27 31*   ANION GAP mmol/L 5 5 5   BUN mg/dL 20 12 12   CREATININE mg/dL 0 70 0 68 0 89   EGFR ml/min/1 73sq m 92 93 69   CALCIUM mg/dL 8 1* 7 8* 7 9*     Lab Units 12/31/20  0456 12/30/20  0641 12/29/20  1833   AST U/L 66* 91* 126*   ALT U/L 63* 61* 71*   ALK PHOS U/L 57 55 59   TOTAL PROTEIN g/dL 5 6* 6 2 6 5   ALBUMIN g/dL 2 5* 2 9* 3 2   TOTAL BILIRUBIN mg/dL 0 30 0 50 0 70     Results from last 7 days   Lab Units 12/30/20  1106   POC GLUCOSE mg/dl 112     Lab Units 12/31/20  0456 12/30/20  0641 12/29/20  1833   GLUCOSE RANDOM mg/dL 141* 146* 118*     Results from last 7 days   Lab Units 12/30/20  0641   CK TOTAL U/L 394*   CK MB INDEX % <1 0   CK MB ng/mL 1 4     Lab Units 12/29/20  1833   TROPONIN I ng/mL <0 01     Lab Units 12/29/20  2042   D-DIMER QUANTITATIVE ug/ml FEU 2 60*     Lab Units 12/30/20  0641 12/29/20 2042   PROCALCITONIN ng/ml 0 07 0 11     Lab Units 12/29/20 2043   LACTIC ACID mmol/L 0 8         Results from last 7 days   Lab Units 12/30/20  0641 12/29/20 2042 FERRITIN ng/mL 286 322     Lab Units 12/29/20  1833   LIPASE u/L 161     Lab Units 12/31/20  0456 12/29/20  1833   CRP mg/L 79 4* 143 7*     Lab Units 12/30/20  0440   CLARITY UA  Clear   COLOR UA  Straw   SPEC GRAV UA  1 010   PH UA  5 0   GLUCOSE UA mg/dl Negative   KETONES UA mg/dl Negative   BLOOD UA  10 0*   PROTEIN UA mg/dl 30 (1+)*   NITRITE UA  Negative   BILIRUBIN UA  Negative   UROBILINOGEN UA mg/dL Negative   LEUKOCYTES UA  Negative   WBC UA /hpf None Seen   RBC UA /hpf 0-1   BACTERIA UA /hpf None Seen   EPITHELIAL CELLS WET PREP /hpf Occasional     Lab Units 12/29/20  2041   BLOOD CULTURE  No Growth at 24 hrs  No Growth at 24 hrs       Vital Signs:   12/31/20 1100            94 %   Nasal cannula    Nasal Cannula O2 Flow Rate (L/min): 1 L/min at 12/31/20 1100   12/31/20 0900            94 %   Nasal cannula    Nasal Cannula O2 Flow Rate (L/min): 1 L/min at 12/31/20 0900   12/31/20 0735  96 8 °F (36 °C)  65  20  117/65    94 % 1 5 L/min Nasal cannula Lying     Medications:   Scheduled Medications:  ascorbic acid, 1,000 mg, Oral, Q12H ALLAN  bismuth subsalicylate, 029 mg, Oral, 4x Daily  cholecalciferol, 2,000 Units, Oral, Daily  dexamethasone, 6 mg, Intravenous, Q24H  famotidine, 20 mg, Oral, BID  ferrous sulfate, 325 mg, Oral, Every Other Day  fluticasone, 1 spray, Nasal, Daily  heparin (porcine), 5,000 Units, Subcutaneous, Q8H ALLAN  levothyroxine, 75 mcg, Oral, Daily  zinc sulfate, 220 mg, Oral, Daily    Followed by  Lane Yoder ON 1/6/2021] multivitamin-minerals, 1 tablet, Oral, Daily  remdesivir, 100 mg, Intravenous, Q24H    Continuous IV Infusions:  sodium chloride, 125 mL/hr, Intravenous, Continuous    PRN Meds:  acetaminophen, 650 mg, Oral, Q6H PRN x 1  albuterol, 2 puff, Inhalation, Q4H PRN  ALPRAZolam, 0 25 mg, Oral, BID PRN x 1  benzonatate, 100 mg, Oral, TID PRN x 1  Lidocaine Viscous HCl, 15 mL, Swish & Spit, 4x Daily PRN  ondansetron, 4 mg, Intravenous, Q6H PRN  X 1    Discharge Plan: D    Network Utilization Review Department  ATTENTION: Please call with any questions or concerns to [phone] and carefully listen to the prompts so that you are directed to the right person  All voicemails are confidential   Teddy Encinas all requests for admission clinical reviews, approved or denied determinations and any other requests to dedicated fax number below belonging to the campus where the patient is receiving treatment   List of dedicated fax numbers for the Facilities:  [address] DENIALS (Administrative/Medical Necessity) [phone]   [address] (Maternity/NICU/Pediatrics) [phone]   [address] Zia Finney 1054 (  Jeanine Ackerman "Anh" 103) [address] [place] [address] [address]ando Sneed 14[address] [place] [address] [phone]

## 2021-01-07 ENCOUNTER — TELEMEDICINE (OUTPATIENT)
Dept: INTERNAL MEDICINE CLINIC | Facility: CLINIC | Age: 66
End: 2021-01-07
Payer: COMMERCIAL

## 2021-01-07 DIAGNOSIS — E78.00 PURE HYPERCHOLESTEROLEMIA: ICD-10-CM

## 2021-01-07 DIAGNOSIS — F41.8 DEPRESSION WITH ANXIETY: ICD-10-CM

## 2021-01-07 DIAGNOSIS — U07.1 PNEUMONIA DUE TO COVID-19 VIRUS: ICD-10-CM

## 2021-01-07 DIAGNOSIS — U07.1 COVID-19 VIRUS INFECTION: ICD-10-CM

## 2021-01-07 DIAGNOSIS — E55.9 VITAMIN D DEFICIENCY: ICD-10-CM

## 2021-01-07 DIAGNOSIS — D50.9 IRON DEFICIENCY ANEMIA, UNSPECIFIED IRON DEFICIENCY ANEMIA TYPE: ICD-10-CM

## 2021-01-07 DIAGNOSIS — E03.9 ACQUIRED HYPOTHYROIDISM: Primary | ICD-10-CM

## 2021-01-07 DIAGNOSIS — F41.9 ANXIETY: ICD-10-CM

## 2021-01-07 DIAGNOSIS — J12.82 PNEUMONIA DUE TO COVID-19 VIRUS: ICD-10-CM

## 2021-01-07 PROCEDURE — 99213 OFFICE O/P EST LOW 20 MIN: CPT | Performed by: INTERNAL MEDICINE

## 2021-01-07 NOTE — PROGRESS NOTES
COVID-19 Virtual Visit     Assessment/Plan:  [id number] 17/14 days was admitted to the hospital is now on prednisone off oxygen will call her back on Wednesday or Thursday next week    Problem List Items Addressed This Visit        Endocrine    Hypothyroidism - Primary       Respiratory    Pneumonia due to COVID-19 virus       Other    Depression with anxiety    Vitamin D deficiency    Iron deficiency anemia    Anxiety    Hyperlipidemia    COVID-19 virus infection         Disposition:         70/14 days was hospitalized she is feeling better more energy still some shortness of breath at time but she has a background history of asthma she is finishing her prednisone protocol 20 mg tablets 2 daily she uses the albuterol inhaler will call back in about 6 days for follow-up told her to stay home and rest she is interested in getting the COVID-19 test repeated in may be required for her job which I will order    I have spent 15 minutes directly with the patient  Encounter provider Mindi Zhang MD    Provider located at [place] [address]    Recent Visits  Date Type Provider Dept   01/04/21 [address], MD [facility] [address] recent visits within past 7 days and meeting all other requirements     Today's Visits  Date Type Provider Dept   01/07/21 Telemedicine Mindi Zhang MD [facility]   Showing today's visits and meeting all other requirements     Future Appointments  No visits were found meeting these conditions  Showing future appointments within next 150 days and meeting all other requirements      This virtual check-in was done via [employer] and patient was informed that this is not a secure, HIPAA-compliant platform  She agrees to proceed      Patient agrees to participate in a virtual check in via telephone or video visit instead of presenting to the office to address urgent/immediate medical needs  Patient is aware this is a billable service  After connecting through [facility], the patient was identified by name and date of birth  Michelle Jade was informed that this was a telemedicine visit and that the exam was being conducted confidentially over secure lines  My office door was closed  No one else was in the room  Michelle Jade acknowledged consent and understanding of privacy and security of the telemedicine visit  I informed the patient that I have reviewed her record in Epic and presented the opportunity for her to ask any questions regarding the visit today  The patient agreed to participate  Subjective:   Michelle Jade is a 60 y o  female who has been screened for COVID-19  Symptom change since last report: improving  Patient's symptoms include shortness of breath  Patient denies fever, chills, fatigue, malaise, congestion, rhinorrhea, sore throat, anosmia, loss of taste, cough, chest tightness, abdominal pain, nausea, vomiting, diarrhea, myalgias and headaches  Date of positive COVID-19 PCR: 12/21/2020    17/14 days was hospital at [facility] was given remdesivir &  oxygen she is off oxygen now she is on prednisone protocol she does have a background history of asthma and she uses the albuterol inhaler when she gets short of breath she looks good on video will keep her home and will call her next Wednesday or Thursday      Lab Results   Component Value Date    SARSCOV2 Detected (A) 12/21/2020    6000 [facility] 98 Not Detected 11/23/2020     Past Medical History:   Diagnosis Date    Adhesive capsulitis of left shoulder     Last assessed - 2/16/15    Allergic rhinitis     Last assessed - 3/17/15    Anxiety     Back pain     Disease of thyroid gland     hypo    Hyperlipidemia     borderline , no meds    LBBB (left bundle branch block)     chronic    Migraine     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 for tuberculosis     Psychiatric disorder     Vitamin D deficiency      Past Surgical History:   Procedure Laterality Date    BREAST CYST EXCISION Bilateral     20yrs ago    COLONOSCOPY      HYSTERECTOMY      WI SKIN TISSUE PROCEDURE UNLISTED N/A 7/25/2019    Procedure: ABDOMINOPLASTY WITH LIPOSUCTION;  Surgeon: Belem Hernandez MD;  Location: [address] [place] [address];  Service: Plastics    SHOULDER ARTHROSCOPY      Last assessed - 12/23/14    SHOULDER ARTHROSCOPY W/ ROTATOR CUFF REPAIR      Last assessed - 4/4/16     Current Outpatient Medications   Medication Sig Dispense Refill    acetaminophen (TYLENOL) 500 mg tablet Take 1 tablet (500 mg total) by mouth every 6 (six) hours as needed for mild pain 30 tablet 0    albuterol (PROVENTIL HFA,VENTOLIN HFA) 90 mcg/act inhaler Inhale 2 puffs every 4 (four) hours as needed for wheezing 1 Inhaler 0    ALPRAZolam (XANAX) 0 5 mg tablet Take a half or 1 tablet twice a day as needed for anxiety   30 tablet 1    Ascorbic Acid (vitamin C) 1000 MG tablet Take 1 tablet (1,000 mg total) by mouth daily for 7 days 7 tablet 0    benzonatate (TESSALON PERLES) 100 mg capsule Take 1 capsule (100 mg total) by mouth 3 (three) times a day as needed for cough 20 capsule 0    bismuth subsalicylate (PEPTO BISMOL) 262 MG chewable tablet Chew 2 tablets (524 mg total) 4 (four) times a day (before meals and at bedtime) 30 tablet 0    cholecalciferol (VITAMIN D3) 1,000 units tablet Take 2 tablets (2,000 Units total) by mouth daily 7 tablet 0    ergocalciferol (VITAMIN D2) 50,000 units Take 1 capsule (50,000 Units total) by mouth every 28 days 6 capsule 0    ferrous sulfate 325 (65 Fe) mg tablet Take 1 tablet (325 mg total) by mouth every other day 30 tablet 0    fluticasone (FLONASE) 50 mcg/act nasal spray 2 sprays into each nostril daily 48 mL 1    hydrocortisone 2 5 % cream Apply topically 2 (two) times a day For 1 week only every month as needed 30 g 0    levothyroxine 75 mcg tablet Take 1 tablet (75 mcg total) by mouth daily 90 tablet 1    menthol-cetylpyridinium (CEPACOL) 3 MG lozenge Take 1 lozenge (3 mg total) by mouth as needed for sore throat 30 lozenge 0    Multiple Vitamin (multivitamin) capsule Take 1 capsule by mouth daily 7 capsule 0    naproxen (NAPROSYN) 500 mg tablet One tablet twice a day with meals as needed for pain and the trigger finger 60 tablet 5    ondansetron (ZOFRAN-ODT) 4 mg disintegrating tablet Take 1 tablet (4 mg total) by mouth every 8 (eight) hours as needed for nausea for up to 10 days 20 tablet 0    predniSONE 20 mg tablet Take 2 tablets (40 mg total) by mouth daily for 6 days 12 tablet 0    PREMARIN vaginal cream       senna-docusate sodium (SENOKOT S) 8 6-50 mg per tablet Take 1 tablet by mouth daily at bedtime 30 tablet 0    senna-docusate sodium (SENOKOT-S) 8 6-50 mg per tablet Take 1 tablet by mouth daily (Patient not taking: Reported on 12/29/2020) 7 tablet 0    zinc sulfate (ZINCATE) 220 mg capsule Take 1 capsule (220 mg total) by mouth daily for 3 doses 3 capsule 0     No current facility-administered medications for this visit  No Known Allergies    Review of Systems   Constitutional: Negative  Negative for activity change, appetite change, chills, fatigue, fever and unexpected weight change  HENT: Negative for congestion, ear pain, hearing loss, mouth sores, postnasal drip, rhinorrhea, sore throat, trouble swallowing and voice change  Eyes: Negative for pain, redness and visual disturbance  Respiratory: Positive for shortness of breath  Negative for cough, chest tightness and wheezing  Cardiovascular: Negative for chest pain, palpitations and leg swelling  Gastrointestinal: Negative for abdominal distention, abdominal pain, blood in stool, constipation, diarrhea, nausea and vomiting  Endocrine: Negative for cold intolerance, heat intolerance, polydipsia, polyphagia and polyuria  Genitourinary: Negative for difficulty urinating, dysuria, flank pain, frequency, hematuria and urgency     Musculoskeletal: Negative for arthralgias, back pain, gait problem, joint swelling and myalgias  Skin: Negative for color change and pallor  Neurological: Negative for dizziness, tremors, seizures, syncope, weakness, numbness and headaches  Hematological: Negative for adenopathy  Does not bruise/bleed easily  Psychiatric/Behavioral: Negative  Negative for sleep disturbance  The patient is not nervous/anxious  Objective: There were no vitals filed for this visit  Physical Exam  Constitutional:       General: She is not in acute distress  Appearance: She is not ill-appearing or toxic-appearing  HENT:      Head: Normocephalic  Eyes:      Pupils: Pupils are equal, round, and reactive to light  Pulmonary:      Effort: Pulmonary effort is normal  No respiratory distress  Skin:     Findings: No rash  Neurological:      Mental Status: She is alert and oriented to person, place, and time  Psychiatric:         Mood and Affect: Mood normal          Behavior: Behavior normal        VIRTUAL VISIT DISCLAIMER    [address] [place] [address] Virginia acknowledges that she has consented to an online visit or consultation  She understands that the online visit is based solely on information provided by her, and that, in the absence of a face-to-face physical evaluation by the physician, the diagnosis she receives is both limited and provisional in terms of accuracy and completeness  This is not intended to replace a full medical face-to-face evaluation by the physician  [address] [place] [address] Rigoe understands and accepts these terms

## 2021-01-13 ENCOUNTER — TELEMEDICINE (OUTPATIENT)
Dept: INTERNAL MEDICINE CLINIC | Facility: CLINIC | Age: 66
End: 2021-01-13
Payer: COMMERCIAL

## 2021-01-13 DIAGNOSIS — J12.82 PNEUMONIA DUE TO COVID-19 VIRUS: ICD-10-CM

## 2021-01-13 DIAGNOSIS — U07.1 COVID-19 VIRUS INFECTION: ICD-10-CM

## 2021-01-13 DIAGNOSIS — D50.9 IRON DEFICIENCY ANEMIA, UNSPECIFIED IRON DEFICIENCY ANEMIA TYPE: ICD-10-CM

## 2021-01-13 DIAGNOSIS — E03.9 ACQUIRED HYPOTHYROIDISM: Primary | ICD-10-CM

## 2021-01-13 DIAGNOSIS — E78.00 PURE HYPERCHOLESTEROLEMIA: ICD-10-CM

## 2021-01-13 DIAGNOSIS — I83.813 VARICOSE VEINS OF LOWER EXTREMITY WITH PAIN, BILATERAL: ICD-10-CM

## 2021-01-13 DIAGNOSIS — F41.8 DEPRESSION WITH ANXIETY: ICD-10-CM

## 2021-01-13 DIAGNOSIS — U07.1 PNEUMONIA DUE TO COVID-19 VIRUS: ICD-10-CM

## 2021-01-13 DIAGNOSIS — E55.9 VITAMIN D DEFICIENCY: ICD-10-CM

## 2021-01-13 PROCEDURE — 99213 OFFICE O/P EST LOW 20 MIN: CPT | Performed by: INTERNAL MEDICINE

## 2021-01-13 NOTE — PROGRESS NOTES
COVID-19 Virtual Visit     Assessment/Plan:  COVID-19 23 days fatigue and dyspnea on exertion no fever she is off quarantine she works from home will see her in the office in 7-10 days to check her lungs and pulse ox she was admitted to viral pneumonia around the holidays last year    Problem List Items Addressed This Visit        Endocrine    Hypothyroidism - Primary       Respiratory    Pneumonia due to COVID-19 virus       Cardiovascular and Mediastinum    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, bilateral       Other    Depression with anxiety    Vitamin D deficiency    Iron deficiency anemia    Hyperlipidemia    COVID-19 virus infection         Disposition:         Off quarantine try to stay physically active will see her back in 7-10 days in the office    I have spent 15 minutes directly with the patient  Greater than 50% of this time was spent in counseling/coordination of care regarding: diagnostic results, prognosis, risks and benefits of treatment options, importance of treatment compliance, risk factor reductions and impressions  Encounter provider Hakeem Segundo MD    Provider located at [place] [phone]    Recent Visits  Date Type Provider Dept   01/07/21 [address], MD [facility] [address] recent visits within past 7 days and meeting all other requirements     Today's Visits  Date Type Provider Dept   01/13/21 Telemedicine Hakeem Segundo MD [facility] [place]   Showing today's visits and meeting all other requirements     Future Appointments  No visits were found meeting these conditions  Showing future appointments within next 150 days and meeting all other requirements      This virtual check-in was done via [employer] and patient was informed that this is not a secure, HIPAA-compliant platform  She agrees to proceed      Patient agrees to participate in a virtual check in via telephone or video visit instead of presenting to the office to address urgent/immediate medical needs  Patient is aware this is a billable service  After connecting through [facility], the patient was identified by name and date of birth  Konrad Soto was informed that this was a telemedicine visit and that the exam was being conducted confidentially over secure lines  My office door was closed  No one else was in the room  Konrad Soto acknowledged consent and understanding of privacy and security of the telemedicine visit  I informed the patient that I have reviewed her record in Epic and presented the opportunity for her to ask any questions regarding the visit today  The patient agreed to participate  Subjective:   Konrad Soto is a 60 y o  female who has been screened for COVID-19  Symptom change since last report: unchanged  Patient's symptoms include fatigue and shortness of breath  Patient denies fever, chills, malaise, congestion, rhinorrhea, sore throat, anosmia, loss of taste, cough, chest tightness, abdominal pain, nausea, vomiting, diarrhea, myalgias and headaches  Jann Washburn has been staying home and has isolated themselves in her home  She is taking care to not share personal items and is cleaning all surfaces that are touched often, like counters, tabletops, and doorknobs using household cleaning sprays or wipes  She is wearing a mask when she leaves her room       Date of positive COVID-19 PCR: 12/21/2020    23/14 days dyspnea on exertion and fatigue lingering symptoms of a long hauler told her to try to stay active will have her come back in the office in 7-10 days check a pulse ox and her lungs denies chest pain no GI symptoms    Lab Results   Component Value Date    SARSCOV2 Detected (A) 12/21/2020    Baldomero Steency Not Detected 11/23/2020     Past Medical History:   Diagnosis Date    Adhesive capsulitis of left shoulder     Last assessed - 2/16/15    Allergic rhinitis     Last assessed - 3/17/15    Anxiety     Back pain     Disease of thyroid gland     hypo    Hyperlipidemia     borderline , no meds    LBBB (left bundle branch block)     chronic    Migraine     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 for tuberculosis     Psychiatric disorder     Vitamin D deficiency      Past Surgical History:   Procedure Laterality Date    BREAST CYST EXCISION Bilateral     20yrs ago    COLONOSCOPY      HYSTERECTOMY      [place] SKIN TISSUE PROCEDURE UNLISTED N/A 7/25/2019    Procedure: ABDOMINOPLASTY WITH LIPOSUCTION;  Surgeon: Tiago Piña MD;  Location: [address] [place] [address];  Service: Plastics    SHOULDER ARTHROSCOPY      Last assessed - 12/23/14    SHOULDER ARTHROSCOPY W/ ROTATOR CUFF REPAIR      Last assessed - 4/4/16     Current Outpatient Medications   Medication Sig Dispense Refill    acetaminophen (TYLENOL) 500 mg tablet Take 1 tablet (500 mg total) by mouth every 6 (six) hours as needed for mild pain 30 tablet 0    albuterol (PROVENTIL HFA,VENTOLIN HFA) 90 mcg/act inhaler Inhale 2 puffs every 4 (four) hours as needed for wheezing 1 Inhaler 0    ALPRAZolam (XANAX) 0 5 mg tablet Take a half or 1 tablet twice a day as needed for anxiety   30 tablet 1    Ascorbic Acid (vitamin C) 1000 MG tablet Take 1 tablet (1,000 mg total) by mouth daily for 7 days 7 tablet 0    benzonatate (TESSALON PERLES) 100 mg capsule Take 1 capsule (100 mg total) by mouth 3 (three) times a day as needed for cough 20 capsule 0    bismuth subsalicylate (PEPTO BISMOL) 262 MG chewable tablet Chew 2 tablets (524 mg total) 4 (four) times a day (before meals and at bedtime) 30 tablet 0    cholecalciferol (VITAMIN D3) 1,000 units tablet Take 2 tablets (2,000 Units total) by mouth daily 7 tablet 0    ergocalciferol (VITAMIN D2) 50,000 units Take 1 capsule (50,000 Units total) by mouth every 28 days 6 capsule 0    ferrous sulfate 325 (65 Fe) mg tablet Take 1 tablet (325 mg total) by mouth every other day 30 tablet 0    fluticasone (FLONASE) 50 mcg/act nasal spray 2 sprays into each nostril daily 48 mL 1    hydrocortisone 2 5 % cream Apply topically 2 (two) times a day For 1 week only every month as needed 30 g 0    levothyroxine 75 mcg tablet Take 1 tablet (75 mcg total) by mouth daily 90 tablet 1    menthol-cetylpyridinium (CEPACOL) 3 MG lozenge Take 1 lozenge (3 mg total) by mouth as needed for sore throat 30 lozenge 0    Multiple Vitamin (multivitamin) capsule Take 1 capsule by mouth daily 7 capsule 0    naproxen (NAPROSYN) 500 mg tablet One tablet twice a day with meals as needed for pain and the trigger finger 60 tablet 5    ondansetron (ZOFRAN-ODT) 4 mg disintegrating tablet Take 1 tablet (4 mg total) by mouth every 8 (eight) hours as needed for nausea for up to 10 days 20 tablet 0    PREMARIN vaginal cream       senna-docusate sodium (SENOKOT S) 8 6-50 mg per tablet Take 1 tablet by mouth daily at bedtime 30 tablet 0    senna-docusate sodium (SENOKOT-S) 8 6-50 mg per tablet Take 1 tablet by mouth daily (Patient not taking: Reported on 12/29/2020) 7 tablet 0    zinc sulfate (ZINCATE) 220 mg capsule Take 1 capsule (220 mg total) by mouth daily for 3 doses 3 capsule 0     No current facility-administered medications for this visit  No Known Allergies    Review of Systems   Constitutional: Positive for fatigue  Negative for activity change, appetite change, chills, fever and unexpected weight change  HENT: Negative for congestion, ear pain, hearing loss, mouth sores, postnasal drip, rhinorrhea, sore throat, trouble swallowing and voice change  Eyes: Negative for pain, redness and visual disturbance  Respiratory: Positive for shortness of breath  Negative for cough, chest tightness and wheezing  Cardiovascular: Negative for chest pain, palpitations and leg swelling  Gastrointestinal: Negative for abdominal distention, abdominal pain, blood in stool, constipation, diarrhea, nausea and vomiting  Endocrine: Negative for cold intolerance, heat intolerance, polydipsia, polyphagia and polyuria  Genitourinary: Negative for difficulty urinating, dysuria, flank pain, frequency, hematuria and urgency  Musculoskeletal: Negative for arthralgias, back pain, gait problem, joint swelling and myalgias  Skin: Negative for color change and pallor  Neurological: Negative for dizziness, tremors, seizures, syncope, weakness, numbness and headaches  Hematological: Negative for adenopathy  Does not bruise/bleed easily  Psychiatric/Behavioral: Negative  Negative for sleep disturbance  The patient is not nervous/anxious  Objective: There were no vitals filed for this visit  Physical Exam  Constitutional:       General: She is not in acute distress  Appearance: She is obese  She is not ill-appearing or toxic-appearing  HENT:      Head: Normocephalic  Pulmonary:      Effort: Pulmonary effort is normal  No respiratory distress  Skin:     Findings: No rash  Neurological:      Mental Status: She is alert and oriented to person, place, and time  Psychiatric:         Mood and Affect: Mood normal          Behavior: Behavior normal        VIRTUAL VISIT DISCLAIMER    [address] [place] [address] [place] acknowledges that she has consented to an online visit or consultation  She understands that the online visit is based solely on information provided by her, and that, in the absence of a face-to-face physical evaluation by the physician, the diagnosis she receives is both limited and provisional in terms of accuracy and completeness  This is not intended to replace a full medical face-to-face evaluation by the physician  [address] [place] [address] Rigoe understands and accepts these terms

## 2021-01-18 DIAGNOSIS — E55.9 VITAMIN D DEFICIENCY: ICD-10-CM

## 2021-01-19 RX ORDER — ERGOCALCIFEROL 1.25 MG/1
50000 CAPSULE ORAL
Qty: 3 CAPSULE | Refills: 2 | Status: SHIPPED | OUTPATIENT
Start: 2021-01-19 | End: 2021-01-25 | Stop reason: SDUPTHER

## 2021-01-25 ENCOUNTER — OFFICE VISIT (OUTPATIENT)
Dept: INTERNAL MEDICINE CLINIC | Facility: CLINIC | Age: 66
End: 2021-01-25
Payer: COMMERCIAL

## 2021-01-25 VITALS
HEIGHT: 59 IN | HEART RATE: 86 BPM | BODY MASS INDEX: 26.61 KG/M2 | WEIGHT: 132 LBS | SYSTOLIC BLOOD PRESSURE: 140 MMHG | DIASTOLIC BLOOD PRESSURE: 80 MMHG | TEMPERATURE: 97.8 F | OXYGEN SATURATION: 100 % | RESPIRATION RATE: 13 BRPM

## 2021-01-25 DIAGNOSIS — J12.82 PNEUMONIA DUE TO COVID-19 VIRUS: ICD-10-CM

## 2021-01-25 DIAGNOSIS — U07.1 COVID-19 VIRUS INFECTION: ICD-10-CM

## 2021-01-25 DIAGNOSIS — U07.1 PNEUMONIA DUE TO COVID-19 VIRUS: ICD-10-CM

## 2021-01-25 DIAGNOSIS — E03.9 ACQUIRED HYPOTHYROIDISM: Primary | ICD-10-CM

## 2021-01-25 DIAGNOSIS — I45.4: ICD-10-CM

## 2021-01-25 DIAGNOSIS — E78.00 PURE HYPERCHOLESTEROLEMIA: ICD-10-CM

## 2021-01-25 DIAGNOSIS — E55.9 VITAMIN D DEFICIENCY: ICD-10-CM

## 2021-01-25 DIAGNOSIS — F41.8 DEPRESSION WITH ANXIETY: ICD-10-CM

## 2021-01-25 PROCEDURE — 1111F DSCHRG MED/CURRENT MED MERGE: CPT | Performed by: INTERNAL MEDICINE

## 2021-01-25 PROCEDURE — 99214 OFFICE O/P EST MOD 30 MIN: CPT | Performed by: INTERNAL MEDICINE

## 2021-01-25 PROCEDURE — 1036F TOBACCO NON-USER: CPT | Performed by: INTERNAL MEDICINE

## 2021-01-25 PROCEDURE — 3079F DIAST BP 80-89 MM HG: CPT | Performed by: INTERNAL MEDICINE

## 2021-01-25 PROCEDURE — 3077F SYST BP >= 140 MM HG: CPT | Performed by: INTERNAL MEDICINE

## 2021-01-25 PROCEDURE — 3008F BODY MASS INDEX DOCD: CPT | Performed by: INTERNAL MEDICINE

## 2021-01-25 RX ORDER — ERGOCALCIFEROL 1.25 MG/1
50000 CAPSULE ORAL
Qty: 6 CAPSULE | Refills: 1 | Status: SHIPPED | OUTPATIENT
Start: 2021-01-25 | End: 2021-11-11

## 2021-01-25 RX ORDER — ALPRAZOLAM 0.5 MG/1
TABLET ORAL
Qty: 30 TABLET | Refills: 1 | Status: SHIPPED | OUTPATIENT
Start: 2021-01-25

## 2021-01-25 NOTE — PROGRESS NOTES
BMI Counseling: Body mass index is 26 66 kg/m²  The BMI is above normal  Nutrition recommendations include decreasing portion sizes, encouraging healthy choices of fruits and vegetables, decreasing fast food intake, consuming healthier snacks, limiting drinks that contain sugar, moderation in carbohydrate intake, increasing intake of lean protein, reducing intake of saturated and trans fat and reducing intake of cholesterol  Exercise recommendations include moderate physical activity 150 minutes/week  No pharmacotherapy was ordered  Patient referred to PCP due to patient being overweight  Assessmentn:  COVID-19 status post viral pneumonia dyspnea on exertion long  HAULER refer to pulmonary check a 2D echo check pulmonary function studies check labs will see her back in 5 weeks           pulse ox resting was 100% with the falls over 86 after taking a walk for about a couple of minutes in the office the pulse ox 1 down to 95% on room air with a heart rate of 118 some shortness or breath after the    Problem 1  Status post viral pneumonia was hospitalize she is low Virginie recuperating she feels better than 2 weeks ago she still has some dyspnea on exertion she has no wheezing or rales on physical examination will have Pulmonary evaluate to see if we need to do anything else  Anxiety I reorder the Xanax  On a p r n  Basis  Left bundle branch block which she had previously I am going to check a 2D echo to rule out the cardiomyopathy  She has no evidence of heart failure    Hypothyroidism continue levothyroxine replacement will check a TSH    She has no GI symptoms of nausea vomiting which is good news  Both her  and her mother had COVID-19 but she was the most symptomatic in the family  As far as the COVID-19 vaccine she has to wait 90 days from her infection which puts of packed in to May 1st of this year      Will see her back in 5 weeks            No problem-specific Assessment & Plan notes found for this encounter  Diagnoses and all orders for this visit:    Acquired hypothyroidism  -     CBC and differential; Future  -     Comprehensive metabolic panel; Future  -     TSH, 3rd generation with Free T4 reflex; Future  -     Magnesium; Future  -     C-reactive protein; Future  -     Sedimentation rate, automated; Future  -     D-dimer, quantitative; Future  -     NT-BNP PRO; Future  -     Ferritin; Future    Pneumonia due to COVID-19 virus  -     Ambulatory referral to Pulmonology; Future  -     Complete PFT with post bronchodilator  -     Echo complete with contrast if indicated; Future  -     CBC and differential; Future  -     Comprehensive metabolic panel; Future  -     TSH, 3rd generation with Free T4 reflex; Future  -     Magnesium; Future  -     C-reactive protein; Future  -     Sedimentation rate, automated; Future  -     D-dimer, quantitative; Future  -     NT-BNP PRO; Future  -     Ferritin; Future    Vitamin D deficiency  -     ergocalciferol (VITAMIN D2) 50,000 units; Take 1 capsule (50,000 Units total) by mouth every 28 days  -     CBC and differential; Future  -     Comprehensive metabolic panel; Future  -     TSH, 3rd generation with Free T4 reflex; Future  -     Magnesium; Future  -     C-reactive protein; Future  -     Sedimentation rate, automated; Future  -     D-dimer, quantitative; Future  -     NT-BNP PRO; Future  -     Ferritin; Future    COVID-19 virus infection  -     Ambulatory referral to Pulmonology; Future  -     Complete PFT with post bronchodilator  -     Echo complete with contrast if indicated; Future  -     CBC and differential; Future  -     Comprehensive metabolic panel; Future  -     TSH, 3rd generation with Free T4 reflex; Future  -     Magnesium; Future  -     C-reactive protein; Future  -     Sedimentation rate, automated; Future  -     D-dimer, quantitative; Future  -     NT-BNP PRO; Future  -     Ferritin;  Future    Pure hypercholesterolemia    Depression with anxiety  -     CBC and differential; Future  -     Comprehensive metabolic panel; Future  -     TSH, 3rd generation with Free T4 reflex; Future  -     Magnesium; Future  -     C-reactive protein; Future  -     Sedimentation rate, automated; Future  -     D-dimer, quantitative; Future  -     NT-BNP PRO; Future  -     Ferritin; Future  -     ALPRAZolam (XANAX) 0 5 mg tablet; Take a half or 1 tablet twice a day as needed for anxiety  Subjective:      Patient ID: Janie Wagner is a 60 y o  female  Chief Complaint   Patient presents with    Follow-up     ONLY USING LEVOTHYROXINE  Needs colonoscopy   Shortness of Breath     when she walks or is outside in the cold    Dizziness     most of the time    Fatigue         Current Outpatient Medications:     levothyroxine 75 mcg tablet, Take 1 tablet (75 mcg total) by mouth daily, Disp: 90 tablet, Rfl: 1    acetaminophen (TYLENOL) 500 mg tablet, Take 1 tablet (500 mg total) by mouth every 6 (six) hours as needed for mild pain (Patient not taking: Reported on 1/25/2021), Disp: 30 tablet, Rfl: 0    albuterol (PROVENTIL HFA,VENTOLIN HFA) 90 mcg/act inhaler, Inhale 2 puffs every 4 (four) hours as needed for wheezing (Patient not taking: Reported on 1/25/2021), Disp: 1 Inhaler, Rfl: 0    ALPRAZolam (XANAX) 0 5 mg tablet, Take a half or 1 tablet twice a day as needed for anxiety  , Disp: 30 tablet, Rfl: 1    Ascorbic Acid (vitamin C) 1000 MG tablet, Take 1 tablet (1,000 mg total) by mouth daily for 7 days, Disp: 7 tablet, Rfl: 0    benzonatate (TESSALON PERLES) 100 mg capsule, Take 1 capsule (100 mg total) by mouth 3 (three) times a day as needed for cough (Patient not taking: Reported on 1/25/2021), Disp: 20 capsule, Rfl: 0    bismuth subsalicylate (PEPTO BISMOL) 262 MG chewable tablet, Chew 2 tablets (524 mg total) 4 (four) times a day (before meals and at bedtime) (Patient not taking: Reported on 1/25/2021), Disp: 30 tablet, Rfl: 0   cholecalciferol (VITAMIN D3) 1,000 units tablet, Take 2 tablets (2,000 Units total) by mouth daily (Patient not taking: Reported on 1/25/2021), Disp: 7 tablet, Rfl: 0    ergocalciferol (VITAMIN D2) 50,000 units, Take 1 capsule (50,000 Units total) by mouth every 28 days, Disp: 6 capsule, Rfl: 1    ferrous sulfate 325 (65 Fe) mg tablet, Take 1 tablet (325 mg total) by mouth every other day (Patient not taking: Reported on 1/25/2021), Disp: 30 tablet, Rfl: 0    fluticasone (FLONASE) 50 mcg/act nasal spray, 2 sprays into each nostril daily (Patient not taking: Reported on 1/25/2021), Disp: 48 mL, Rfl: 1    hydrocortisone 2 5 % cream, Apply topically 2 (two) times a day For 1 week only every month as needed (Patient not taking: Reported on 1/25/2021), Disp: 30 g, Rfl: 0    menthol-cetylpyridinium (CEPACOL) 3 MG lozenge, Take 1 lozenge (3 mg total) by mouth as needed for sore throat (Patient not taking: Reported on 1/25/2021), Disp: 30 lozenge, Rfl: 0    Multiple Vitamin (multivitamin) capsule, Take 1 capsule by mouth daily (Patient not taking: Reported on 1/25/2021), Disp: 7 capsule, Rfl: 0    naproxen (NAPROSYN) 500 mg tablet, One tablet twice a day with meals as needed for pain and the trigger finger (Patient not taking: Reported on 1/25/2021), Disp: 60 tablet, Rfl: 5    ondansetron (ZOFRAN-ODT) 4 mg disintegrating tablet, Take 1 tablet (4 mg total) by mouth every 8 (eight) hours as needed for nausea for up to 10 days, Disp: 20 tablet, Rfl: 0    PREMARIN vaginal cream, , Disp: , Rfl:     senna-docusate sodium (SENOKOT S) 8 6-50 mg per tablet, Take 1 tablet by mouth daily at bedtime (Patient not taking: Reported on 1/25/2021), Disp: 30 tablet, Rfl: 0    senna-docusate sodium (SENOKOT-S) 8 6-50 mg per tablet, Take 1 tablet by mouth daily (Patient not taking: Reported on 12/29/2020), Disp: 7 tablet, Rfl: 0    zinc sulfate (ZINCATE) 220 mg capsule, Take 1 capsule (220 mg total) by mouth daily for 3 doses, Disp: 3 capsule, Rfl: 0    HPI    The following portions of the patient's history were reviewed and updated as appropriate: allergies, current medications, past family history, past medical history, past social history, past surgical history and problem list     Review of Systems   Constitutional: Positive for fatigue  Negative for activity change, appetite change, fever and unexpected weight change  HENT: Negative for congestion, ear pain, hearing loss, mouth sores, postnasal drip, rhinorrhea, sore throat, trouble swallowing and voice change  Eyes: Negative for pain, redness and visual disturbance  Respiratory: Positive for shortness of breath  Negative for cough, chest tightness and wheezing  Cardiovascular: Negative for chest pain, palpitations and leg swelling  Gastrointestinal: Negative for abdominal distention, abdominal pain, blood in stool, constipation, diarrhea and nausea  Endocrine: Negative for cold intolerance, heat intolerance, polydipsia, polyphagia and polyuria  Genitourinary: Negative for difficulty urinating, dysuria, flank pain, frequency, hematuria and urgency  Musculoskeletal: Negative for arthralgias, back pain, gait problem, joint swelling and myalgias  Skin: Negative for color change and pallor  Neurological: Negative for dizziness, tremors, seizures, syncope, weakness, numbness and headaches  Hematological: Negative for adenopathy  Does not bruise/bleed easily  Psychiatric/Behavioral: Negative  Negative for sleep disturbance  The patient is not nervous/anxious  Objective:    /80 (BP Location: Left arm, Patient Position: Sitting)   Pulse 86   Temp 97 8 °F (36 6 °C)   Resp 13   Ht 4' 11" (1 499 m)   Wt 59 9 kg (132 lb)   SpO2 100%   BMI 26 66 kg/m²      Physical Exam  Vitals signs and nursing note reviewed  Constitutional:       Appearance: She is well-developed  HENT:      Head: Normocephalic        Right Ear: External ear normal       Left Ear: External ear normal       Nose: Nose normal       Mouth/Throat:      Pharynx: No oropharyngeal exudate  Eyes:      Conjunctiva/sclera: Conjunctivae normal       Pupils: Pupils are equal, round, and reactive to light  Neck:      Musculoskeletal: Normal range of motion and neck supple  Thyroid: No thyromegaly  Cardiovascular:      Rate and Rhythm: Normal rate and regular rhythm  Heart sounds: Normal heart sounds  No murmur  No friction rub  No gallop  Comments: S1-S2 regular rhythm  Extremities no edema  Pulmonary:      Effort: Pulmonary effort is normal  No respiratory distress  Breath sounds: Normal breath sounds  No wheezing or rales  Comments: Lungs are clear no wheezing rales or rhonchi  Abdominal:      General: Bowel sounds are normal  There is no distension  Palpations: Abdomen is soft  There is no mass  Tenderness: There is no abdominal tenderness  There is no guarding or rebound  Comments: Abdomen soft nontender   Musculoskeletal: Normal range of motion  Lymphadenopathy:      Cervical: No cervical adenopathy  Skin:     General: Skin is warm and dry  Neurological:      Mental Status: She is alert and oriented to person, place, and time     Psychiatric:         Behavior: Behavior normal          Judgment: Judgment normal

## 2021-01-25 NOTE — LETTER
January 25, 2021     [facility] [address]    Patient: Tyrese Wong   YOB: 1955   Date of Visit: 1/25/2021       Dear Dr Danii Maldonado:    Thank you for referring Jared Dave to me for evaluation  Below are my notes for this consultation  If you have questions, please do not hesitate to call me  I look forward to following your patient along with you  Saw  patient this afternoon had COVID-19 viral pneumonia was admitted to the hospital she still has some dyspnea on exertion with a pulse ox the ranges from 99 to 95--93 after walking in the office she has a left bundle branch block that was present previously I am going to check pulmonary function studies with the D[facility]  and send her for 2D echo if you could check her out for me and have any other ideas I told her time requires for her to recuperate    Thank you for your attention    Kym Woods       Sincerely,        Felix Gordon MD        CC: No Recipients  Felix Gordon MD  1/25/2021  4:19 PM  Sign when Signing Visit  BMI Counseling: Body mass index is 26 66 kg/m²  The BMI is above normal  Nutrition recommendations include decreasing portion sizes, encouraging healthy choices of fruits and vegetables, decreasing fast food intake, consuming healthier snacks, limiting drinks that contain sugar, moderation in carbohydrate intake, increasing intake of lean protein, reducing intake of saturated and trans fat and reducing intake of cholesterol  Exercise recommendations include moderate physical activity 150 minutes/week  No pharmacotherapy was ordered  Patient referred to PCP due to patient being overweight         Assessmentn:  COVID-19 status post viral pneumonia dyspnea on exertion long  HAULER refer to pulmonary check a 2D echo check pulmonary function studies check labs will see her back in 5 weeks           pulse ox resting was 100% with the falls over 86 after taking a walk for about a couple of minutes in the office the pulse ox 1 down to 95% on room air with a heart rate of 118 some shortness or breath after the    Problem 1  Status post viral pneumonia was hospitalize she is low Virginie recuperating she feels better than 2 weeks ago she still has some dyspnea on exertion she has no wheezing or rales on physical examination will have Pulmonary evaluate to see if we need to do anything else  Anxiety I reorder the Xanax  On a p r n  Basis  Left bundle branch block which she had previously I am going to check a 2D echo to rule out the cardiomyopathy  She has no evidence of heart failure    Hypothyroidism continue levothyroxine replacement will check a TSH    She has no GI symptoms of nausea vomiting which is good news  Both her  and her mother had COVID-19 but she was the most symptomatic in the family  As far as the COVID-19 vaccine she has to wait 90 days from her infection which puts of packed in to May 1st of this year  Will see her back in 5 weeks            No problem-specific Assessment & Plan notes found for this encounter  Diagnoses and all orders for this visit:    Acquired hypothyroidism  -     CBC and differential; Future  -     Comprehensive metabolic panel; Future  -     TSH, 3rd generation with Free T4 reflex; Future  -     Magnesium; Future  -     C-reactive protein; Future  -     Sedimentation rate, automated; Future  -     D-dimer, quantitative; Future  -     NT-BNP PRO; Future  -     Ferritin; Future    Pneumonia due to COVID-19 virus  -     Ambulatory referral to Pulmonology; Future  -     Complete PFT with post bronchodilator  -     Echo complete with contrast if indicated; Future  -     CBC and differential; Future  -     Comprehensive metabolic panel; Future  -     TSH, 3rd generation with Free T4 reflex; Future  -     Magnesium; Future  -     C-reactive protein; Future  -     Sedimentation rate, automated; Future  -     D-dimer, quantitative;  Future  -     NT-BNP PRO; Future  -     Ferritin; Future    Vitamin D deficiency  -     ergocalciferol (VITAMIN D2) 50,000 units; Take 1 capsule (50,000 Units total) by mouth every 28 days  -     CBC and differential; Future  -     Comprehensive metabolic panel; Future  -     TSH, 3rd generation with Free T4 reflex; Future  -     Magnesium; Future  -     C-reactive protein; Future  -     Sedimentation rate, automated; Future  -     D-dimer, quantitative; Future  -     NT-BNP PRO; Future  -     Ferritin; Future    COVID-19 virus infection  -     Ambulatory referral to Pulmonology; Future  -     Complete PFT with post bronchodilator  -     Echo complete with contrast if indicated; Future  -     CBC and differential; Future  -     Comprehensive metabolic panel; Future  -     TSH, 3rd generation with Free T4 reflex; Future  -     Magnesium; Future  -     C-reactive protein; Future  -     Sedimentation rate, automated; Future  -     D-dimer, quantitative; Future  -     NT-BNP PRO; Future  -     Ferritin; Future    Pure hypercholesterolemia    Depression with anxiety  -     CBC and differential; Future  -     Comprehensive metabolic panel; Future  -     TSH, 3rd generation with Free T4 reflex; Future  -     Magnesium; Future  -     C-reactive protein; Future  -     Sedimentation rate, automated; Future  -     D-dimer, quantitative; Future  -     NT-BNP PRO; Future  -     Ferritin; Future  -     ALPRAZolam (XANAX) 0 5 mg tablet; Take a half or 1 tablet twice a day as needed for anxiety  Subjective:      Patient ID: Konrad Soto is a 60 y o  female  Chief Complaint   Patient presents with    Follow-up     ONLY USING LEVOTHYROXINE  Needs colonoscopy      Shortness of Breath     when she walks or is outside in the cold    Dizziness     most of the time    Fatigue         Current Outpatient Medications:     levothyroxine 75 mcg tablet, Take 1 tablet (75 mcg total) by mouth daily, Disp: 90 tablet, Rfl: 1    acetaminophen (TYLENOL) 500 mg tablet, Take 1 tablet (500 mg total) by mouth every 6 (six) hours as needed for mild pain (Patient not taking: Reported on 1/25/2021), Disp: 30 tablet, Rfl: 0    albuterol (PROVENTIL HFA,VENTOLIN HFA) 90 mcg/act inhaler, Inhale 2 puffs every 4 (four) hours as needed for wheezing (Patient not taking: Reported on 1/25/2021), Disp: 1 Inhaler, Rfl: 0    ALPRAZolam (XANAX) 0 5 mg tablet, Take a half or 1 tablet twice a day as needed for anxiety  , Disp: 30 tablet, Rfl: 1    Ascorbic Acid (vitamin C) 1000 MG tablet, Take 1 tablet (1,000 mg total) by mouth daily for 7 days, Disp: 7 tablet, Rfl: 0    benzonatate (TESSALON PERLES) 100 mg capsule, Take 1 capsule (100 mg total) by mouth 3 (three) times a day as needed for cough (Patient not taking: Reported on 1/25/2021), Disp: 20 capsule, Rfl: 0    bismuth subsalicylate (PEPTO BISMOL) 262 MG chewable tablet, Chew 2 tablets (524 mg total) 4 (four) times a day (before meals and at bedtime) (Patient not taking: Reported on 1/25/2021), Disp: 30 tablet, Rfl: 0    cholecalciferol (VITAMIN D3) 1,000 units tablet, Take 2 tablets (2,000 Units total) by mouth daily (Patient not taking: Reported on 1/25/2021), Disp: 7 tablet, Rfl: 0    ergocalciferol (VITAMIN D2) 50,000 units, Take 1 capsule (50,000 Units total) by mouth every 28 days, Disp: 6 capsule, Rfl: 1    ferrous sulfate 325 (65 Fe) mg tablet, Take 1 tablet (325 mg total) by mouth every other day (Patient not taking: Reported on 1/25/2021), Disp: 30 tablet, Rfl: 0    fluticasone (FLONASE) 50 mcg/act nasal spray, 2 sprays into each nostril daily (Patient not taking: Reported on 1/25/2021), Disp: 48 mL, Rfl: 1    hydrocortisone 2 5 % cream, Apply topically 2 (two) times a day For 1 week only every month as needed (Patient not taking: Reported on 1/25/2021), Disp: 30 g, Rfl: 0    menthol-cetylpyridinium (CEPACOL) 3 MG lozenge, Take 1 lozenge (3 mg total) by mouth as needed for sore throat (Patient not taking: Reported on 1/25/2021), Disp: 30 lozenge, Rfl: 0    Multiple Vitamin (multivitamin) capsule, Take 1 capsule by mouth daily (Patient not taking: Reported on 1/25/2021), Disp: 7 capsule, Rfl: 0    naproxen (NAPROSYN) 500 mg tablet, One tablet twice a day with meals as needed for pain and the trigger finger (Patient not taking: Reported on 1/25/2021), Disp: 60 tablet, Rfl: 5    ondansetron (ZOFRAN-ODT) 4 mg disintegrating tablet, Take 1 tablet (4 mg total) by mouth every 8 (eight) hours as needed for nausea for up to 10 days, Disp: 20 tablet, Rfl: 0    PREMARIN vaginal cream, , Disp: , Rfl:     senna-docusate sodium (SENOKOT S) 8 6-50 mg per tablet, Take 1 tablet by mouth daily at bedtime (Patient not taking: Reported on 1/25/2021), Disp: 30 tablet, Rfl: 0    senna-docusate sodium (SENOKOT-S) 8 6-50 mg per tablet, Take 1 tablet by mouth daily (Patient not taking: Reported on 12/29/2020), Disp: 7 tablet, Rfl: 0    zinc sulfate (ZINCATE) 220 mg capsule, Take 1 capsule (220 mg total) by mouth daily for 3 doses, Disp: 3 capsule, Rfl: 0    HPI    The following portions of the patient's history were reviewed and updated as appropriate: allergies, current medications, past family history, past medical history, past social history, past surgical history and problem list     Review of Systems   Constitutional: Positive for fatigue  Negative for activity change, appetite change, fever and unexpected weight change  HENT: Negative for congestion, ear pain, hearing loss, mouth sores, postnasal drip, rhinorrhea, sore throat, trouble swallowing and voice change  Eyes: Negative for pain, redness and visual disturbance  Respiratory: Positive for shortness of breath  Negative for cough, chest tightness and wheezing  Cardiovascular: Negative for chest pain, palpitations and leg swelling  Gastrointestinal: Negative for abdominal distention, abdominal pain, blood in stool, constipation, diarrhea and nausea     Endocrine: Negative for cold intolerance, heat intolerance, polydipsia, polyphagia and polyuria  Genitourinary: Negative for difficulty urinating, dysuria, flank pain, frequency, hematuria and urgency  Musculoskeletal: Negative for arthralgias, back pain, gait problem, joint swelling and myalgias  Skin: Negative for color change and pallor  Neurological: Negative for dizziness, tremors, seizures, syncope, weakness, numbness and headaches  Hematological: Negative for adenopathy  Does not bruise/bleed easily  Psychiatric/Behavioral: Negative  Negative for sleep disturbance  The patient is not nervous/anxious  Objective:    /80 (BP Location: Left arm, Patient Position: Sitting)   Pulse 86   Temp 97 8 °F (36 6 °C)   Resp 13   Ht 4' 11" (1 499 m)   Wt 59 9 kg (132 lb)   SpO2 100%   BMI 26 66 kg/m²      Physical Exam  Vitals signs and nursing note reviewed  Constitutional:       Appearance: She is well-developed  HENT:      Head: Normocephalic  Right Ear: External ear normal       Left Ear: External ear normal       Nose: Nose normal       Mouth/Throat:      Pharynx: No oropharyngeal exudate  Eyes:      Conjunctiva/sclera: Conjunctivae normal       Pupils: Pupils are equal, round, and reactive to light  Neck:      Musculoskeletal: Normal range of motion and neck supple  Thyroid: No thyromegaly  Cardiovascular:      Rate and Rhythm: Normal rate and regular rhythm  Heart sounds: Normal heart sounds  No murmur  No friction rub  No gallop  Comments: S1-S2 regular rhythm  Extremities no edema  Pulmonary:      Effort: Pulmonary effort is normal  No respiratory distress  Breath sounds: Normal breath sounds  No wheezing or rales  Comments: Lungs are clear no wheezing rales or rhonchi  Abdominal:      General: Bowel sounds are normal  There is no distension  Palpations: Abdomen is soft  There is no mass  Tenderness: There is no abdominal tenderness   There is no guarding or rebound  Comments: Abdomen soft nontender   Musculoskeletal: Normal range of motion  Lymphadenopathy:      Cervical: No cervical adenopathy  Skin:     General: Skin is warm and dry  Neurological:      Mental Status: She is alert and oriented to person, place, and time     Psychiatric:         Behavior: Behavior normal          Judgment: Judgment normal

## 2021-01-25 NOTE — PATIENT INSTRUCTIONS
Keep taking her vitamin-D taking all your other medications   Will going to have a pulmonologist evaluate you   Will going to check a 2D echo to check her heart   Will going to check pulmonary function studies  Going to check some lab work you can do that whenever you do the other test    For your COVID-19 vaccine I will wait till May 1st

## 2021-01-26 DIAGNOSIS — Z01.21 ENCOUNTER FOR DENTAL EXAMINATION AND CLEANING WITH ABNORMAL FINDINGS: ICD-10-CM

## 2021-02-08 ENCOUNTER — HOSPITAL ENCOUNTER (OUTPATIENT)
Dept: PULMONOLOGY | Facility: HOSPITAL | Age: 66
Discharge: HOME | End: 2021-02-08
Attending: INTERNAL MEDICINE
Payer: COMMERCIAL

## 2021-02-08 ENCOUNTER — APPOINTMENT (OUTPATIENT)
Dept: LAB | Facility: HOSPITAL | Age: 66
End: 2021-02-08
Attending: INTERNAL MEDICINE
Payer: COMMERCIAL

## 2021-02-08 DIAGNOSIS — E03.9 ACQUIRED HYPOTHYROIDISM: ICD-10-CM

## 2021-02-08 DIAGNOSIS — E55.9 VITAMIN D DEFICIENCY: ICD-10-CM

## 2021-02-08 DIAGNOSIS — F41.8 DEPRESSION WITH ANXIETY: ICD-10-CM

## 2021-02-08 DIAGNOSIS — U07.1 PNEUMONIA DUE TO COVID-19 VIRUS: ICD-10-CM

## 2021-02-08 DIAGNOSIS — U07.1 COVID-19 VIRUS INFECTION: ICD-10-CM

## 2021-02-08 DIAGNOSIS — J12.82 PNEUMONIA DUE TO COVID-19 VIRUS: ICD-10-CM

## 2021-02-08 LAB
ALBUMIN SERPL BCP-MCNC: 3.7 G/DL (ref 3.5–5)
ALP SERPL-CCNC: 112 U/L (ref 46–116)
ALT SERPL W P-5'-P-CCNC: 27 U/L (ref 12–78)
ANION GAP SERPL CALCULATED.3IONS-SCNC: 8 MMOL/L (ref 4–13)
AST SERPL W P-5'-P-CCNC: 21 U/L (ref 5–45)
BACTERIA UR QL AUTO: ABNORMAL /HPF
BASOPHILS # BLD AUTO: 0.02 THOUSANDS/ΜL (ref 0–0.1)
BASOPHILS NFR BLD AUTO: 0 % (ref 0–1)
BILIRUB SERPL-MCNC: 0.31 MG/DL (ref 0.2–1)
BILIRUB UR QL STRIP: NEGATIVE
BUN SERPL-MCNC: 20 MG/DL (ref 5–25)
CALCIUM SERPL-MCNC: 9.1 MG/DL (ref 8.3–10.1)
CHLORIDE SERPL-SCNC: 105 MMOL/L (ref 100–108)
CLARITY UR: CLEAR
CO2 SERPL-SCNC: 28 MMOL/L (ref 21–32)
COLOR UR: YELLOW
CREAT SERPL-MCNC: 0.85 MG/DL (ref 0.6–1.3)
CREAT UR-MCNC: 81.2 MG/DL
CRP SERPL QL: <3 MG/L
D DIMER PPP FEU-MCNC: 0.74 UG/ML FEU
EOSINOPHIL # BLD AUTO: 0.04 THOUSAND/ΜL (ref 0–0.61)
EOSINOPHIL NFR BLD AUTO: 1 % (ref 0–6)
ERYTHROCYTE [DISTWIDTH] IN BLOOD BY AUTOMATED COUNT: 15.9 % (ref 11.6–15.1)
ERYTHROCYTE [SEDIMENTATION RATE] IN BLOOD: 27 MM/HOUR (ref 0–29)
FERRITIN SERPL-MCNC: 46 NG/ML (ref 8–388)
GFR SERPL CREATININE-BSD FRML MDRD: 73 ML/MIN/1.73SQ M
GLUCOSE SERPL-MCNC: 110 MG/DL (ref 65–140)
GLUCOSE UR STRIP-MCNC: NEGATIVE MG/DL
HCT VFR BLD AUTO: 38.4 % (ref 34.8–46.1)
HGB BLD-MCNC: 11.7 G/DL (ref 11.5–15.4)
HGB UR QL STRIP.AUTO: ABNORMAL
IMM GRANULOCYTES # BLD AUTO: 0.01 THOUSAND/UL (ref 0–0.2)
IMM GRANULOCYTES NFR BLD AUTO: 0 % (ref 0–2)
KETONES UR STRIP-MCNC: NEGATIVE MG/DL
LEUKOCYTE ESTERASE UR QL STRIP: NEGATIVE
LYMPHOCYTES # BLD AUTO: 2.29 THOUSANDS/ΜL (ref 0.6–4.47)
LYMPHOCYTES NFR BLD AUTO: 31 % (ref 14–44)
MAGNESIUM SERPL-MCNC: 1.8 MG/DL (ref 1.6–2.6)
MCH RBC QN AUTO: 26.1 PG (ref 26.8–34.3)
MCHC RBC AUTO-ENTMCNC: 30.5 G/DL (ref 31.4–37.4)
MCV RBC AUTO: 86 FL (ref 82–98)
MICROALBUMIN UR-MCNC: 9.1 MG/L (ref 0–20)
MICROALBUMIN/CREAT 24H UR: 11 MG/G CREATININE (ref 0–30)
MONOCYTES # BLD AUTO: 0.68 THOUSAND/ΜL (ref 0.17–1.22)
MONOCYTES NFR BLD AUTO: 9 % (ref 4–12)
NEUTROPHILS # BLD AUTO: 4.45 THOUSANDS/ΜL (ref 1.85–7.62)
NEUTS SEG NFR BLD AUTO: 59 % (ref 43–75)
NITRITE UR QL STRIP: NEGATIVE
NON-SQ EPI CELLS URNS QL MICRO: ABNORMAL /HPF
NRBC BLD AUTO-RTO: 0 /100 WBCS
NT-PROBNP SERPL-MCNC: 68 PG/ML
PH UR STRIP.AUTO: 5.5 [PH]
PLATELET # BLD AUTO: 381 THOUSANDS/UL (ref 149–390)
PMV BLD AUTO: 10.7 FL (ref 8.9–12.7)
POTASSIUM SERPL-SCNC: 3.9 MMOL/L (ref 3.5–5.3)
PROT SERPL-MCNC: 7.7 G/DL (ref 6.4–8.2)
PROT UR STRIP-MCNC: NEGATIVE MG/DL
RBC # BLD AUTO: 4.49 MILLION/UL (ref 3.81–5.12)
RBC #/AREA URNS AUTO: ABNORMAL /HPF
SODIUM SERPL-SCNC: 141 MMOL/L (ref 136–145)
SP GR UR STRIP.AUTO: 1.02 (ref 1–1.03)
T4 FREE SERPL-MCNC: 1.26 NG/DL (ref 0.76–1.46)
TSH SERPL DL<=0.05 MIU/L-ACNC: 0.21 UIU/ML (ref 0.36–3.74)
UROBILINOGEN UR QL STRIP.AUTO: 0.2 E.U./DL
WBC # BLD AUTO: 7.49 THOUSAND/UL (ref 4.31–10.16)
WBC #/AREA URNS AUTO: ABNORMAL /HPF

## 2021-02-08 PROCEDURE — 85025 COMPLETE CBC W/AUTO DIFF WBC: CPT

## 2021-02-08 PROCEDURE — 94726 PLETHYSMOGRAPHY LUNG VOLUMES: CPT | Performed by: INTERNAL MEDICINE

## 2021-02-08 PROCEDURE — 82728 ASSAY OF FERRITIN: CPT

## 2021-02-08 PROCEDURE — 94010 BREATHING CAPACITY TEST: CPT

## 2021-02-08 PROCEDURE — 94729 DIFFUSING CAPACITY: CPT

## 2021-02-08 PROCEDURE — 85652 RBC SED RATE AUTOMATED: CPT

## 2021-02-08 PROCEDURE — 85379 FIBRIN DEGRADATION QUANT: CPT

## 2021-02-08 PROCEDURE — 84443 ASSAY THYROID STIM HORMONE: CPT

## 2021-02-08 PROCEDURE — 81001 URINALYSIS AUTO W/SCOPE: CPT | Performed by: INTERNAL MEDICINE

## 2021-02-08 PROCEDURE — 94729 DIFFUSING CAPACITY: CPT | Performed by: INTERNAL MEDICINE

## 2021-02-08 PROCEDURE — 94760 N-INVAS EAR/PLS OXIMETRY 1: CPT

## 2021-02-08 PROCEDURE — 82043 UR ALBUMIN QUANTITATIVE: CPT | Performed by: INTERNAL MEDICINE

## 2021-02-08 PROCEDURE — 84439 ASSAY OF FREE THYROXINE: CPT

## 2021-02-08 PROCEDURE — 83880 ASSAY OF NATRIURETIC PEPTIDE: CPT

## 2021-02-08 PROCEDURE — 94726 PLETHYSMOGRAPHY LUNG VOLUMES: CPT

## 2021-02-08 PROCEDURE — 36415 COLL VENOUS BLD VENIPUNCTURE: CPT

## 2021-02-08 PROCEDURE — 94010 BREATHING CAPACITY TEST: CPT | Performed by: INTERNAL MEDICINE

## 2021-02-08 PROCEDURE — 86140 C-REACTIVE PROTEIN: CPT

## 2021-02-08 PROCEDURE — 80053 COMPREHEN METABOLIC PANEL: CPT

## 2021-02-08 PROCEDURE — 83735 ASSAY OF MAGNESIUM: CPT

## 2021-02-08 PROCEDURE — 82570 ASSAY OF URINE CREATININE: CPT | Performed by: INTERNAL MEDICINE

## 2021-03-10 ENCOUNTER — OFFICE VISIT (OUTPATIENT)
Dept: INTERNAL MEDICINE CLINIC | Facility: CLINIC | Age: 66
End: 2021-03-10
Payer: COMMERCIAL

## 2021-03-10 VITALS
TEMPERATURE: 97.7 F | BODY MASS INDEX: 26.81 KG/M2 | HEART RATE: 80 BPM | DIASTOLIC BLOOD PRESSURE: 70 MMHG | SYSTOLIC BLOOD PRESSURE: 130 MMHG | RESPIRATION RATE: 12 BRPM | HEIGHT: 59 IN | WEIGHT: 133 LBS

## 2021-03-10 DIAGNOSIS — E78.00 PURE HYPERCHOLESTEROLEMIA: ICD-10-CM

## 2021-03-10 DIAGNOSIS — J12.82 PNEUMONIA DUE TO COVID-19 VIRUS: ICD-10-CM

## 2021-03-10 DIAGNOSIS — I45.4: ICD-10-CM

## 2021-03-10 DIAGNOSIS — U07.1 COVID-19 VIRUS INFECTION: ICD-10-CM

## 2021-03-10 DIAGNOSIS — F41.8 DEPRESSION WITH ANXIETY: ICD-10-CM

## 2021-03-10 DIAGNOSIS — D50.9 IRON DEFICIENCY ANEMIA, UNSPECIFIED IRON DEFICIENCY ANEMIA TYPE: ICD-10-CM

## 2021-03-10 DIAGNOSIS — U07.1 PNEUMONIA DUE TO COVID-19 VIRUS: ICD-10-CM

## 2021-03-10 DIAGNOSIS — E55.9 VITAMIN D DEFICIENCY: ICD-10-CM

## 2021-03-10 DIAGNOSIS — E03.9 ACQUIRED HYPOTHYROIDISM: Primary | ICD-10-CM

## 2021-03-10 PROCEDURE — 1036F TOBACCO NON-USER: CPT | Performed by: INTERNAL MEDICINE

## 2021-03-10 PROCEDURE — 99214 OFFICE O/P EST MOD 30 MIN: CPT | Performed by: INTERNAL MEDICINE

## 2021-03-10 NOTE — PROGRESS NOTES
Assessment/Plan:  Dyspnea resolved pulmonary function studies are normal get her COVID-19 vaccine I will see her back this summer in 4 months    Problem 1  Status post COVID-19 pneumonia she has recuperated completely she is not short of breath her lungs are clear  Her pulmonary function studies are normal   Which is good news on also the DCI 0 is normal     Problem 2  Vitamin-D deficiency continues to take vitamin-D 25338 units monthly which will continue with that  Problem 3  Hypothyroidism she is on levothyroxine her TSH was normal will check a TSH when she comes back  75 mcg daily of levothyroxine      Allergic rhinitis she is has Flonase nasal spray if needed she is asymptomatic now    We talk about the COVID-19 vaccine I believe she can get a when she is offer to her which is available      Results for orders placed or performed in visit on 02/08/21   CBC and differential   Result Value Ref Range    WBC 7 49 4 31 - 10 16 Thousand/uL    RBC 4 49 3 81 - 5 12 Million/uL    Hemoglobin 11 7 11 5 - 15 4 g/dL    Hematocrit 38 4 34 8 - 46 1 %    MCV 86 82 - 98 fL    MCH 26 1 (L) 26 8 - 34 3 pg    MCHC 30 5 (L) 31 4 - 37 4 g/dL    RDW 15 9 (H) 11 6 - 15 1 %    MPV 10 7 8 9 - 12 7 fL    Platelets 104 888 - 220 Thousands/uL    nRBC 0 /100 WBCs    Neutrophils Relative 59 43 - 75 %    Immat GRANS % 0 0 - 2 %    Lymphocytes Relative 31 14 - 44 %    Monocytes Relative 9 4 - 12 %    Eosinophils Relative 1 0 - 6 %    Basophils Relative 0 0 - 1 %    Neutrophils Absolute 4 45 1 85 - 7 62 Thousands/µL    Immature Grans Absolute 0 01 0 00 - 0 20 Thousand/uL    Lymphocytes Absolute 2 29 0 60 - 4 47 Thousands/µL    Monocytes Absolute 0 68 0 17 - 1 22 Thousand/µL    Eosinophils Absolute 0 04 0 00 - 0 61 Thousand/µL    Basophils Absolute 0 02 0 00 - 0 10 Thousands/µL   Comprehensive metabolic panel   Result Value Ref Range    Sodium 141 136 - 145 mmol/L    Potassium 3 9 3 5 - 5 3 mmol/L    Chloride 105 100 - 108 mmol/L    CO2 28 21 - 32 mmol/L    ANION GAP 8 4 - 13 mmol/L    BUN 20 5 - 25 mg/dL    Creatinine 0 85 0 60 - 1 30 mg/dL    Glucose 110 65 - 140 mg/dL    Calcium 9 1 8 3 - 10 1 mg/dL    AST 21 5 - 45 U/L    ALT 27 12 - 78 U/L    Alkaline Phosphatase 112 46 - 116 U/L    Total Protein 7 7 6 4 - 8 2 g/dL    Albumin 3 7 3 5 - 5 0 g/dL    Total Bilirubin 0 31 0 20 - 1 00 mg/dL    eGFR 73 ml/min/1 73sq m   TSH, 3rd generation with Free T4 reflex   Result Value Ref Range    TSH 3RD GENERATON 0 207 (L) 0 358 - 3 740 uIU/mL   Magnesium   Result Value Ref Range    Magnesium 1 8 1 6 - 2 6 mg/dL   C-reactive protein   Result Value Ref Range    CRP <3 0 <3 0 mg/L   Sedimentation rate, automated   Result Value Ref Range    Sed Rate 27 0 - 29 mm/hour   D-dimer, quantitative   Result Value Ref Range    D-Dimer, Quant 0 74 (H) <0 50 ug/ml FEU   NT-BNP PRO   Result Value Ref Range    NT-proBNP 68 <125 pg/mL   Ferritin   Result Value Ref Range    Ferritin 46 8 - 388 ng/mL   T4, free   Result Value Ref Range    Free T4 1 26 0 76 - 1 46 ng/dL     No problem-specific Assessment & Plan notes found for this encounter  Diagnoses and all orders for this visit:    Acquired hypothyroidism    Pneumonia due to COVID-19 virus    Chronic bundle branch block    Vitamin D deficiency    Iron deficiency anemia, unspecified iron deficiency anemia type    Pure hypercholesterolemia    Depression with anxiety    COVID-19 virus infection          Subjective:      Patient ID: Micky Aleman is a 60 y o  female  No chief complaint on file          Current Outpatient Medications:     acetaminophen (TYLENOL) 500 mg tablet, Take 1 tablet (500 mg total) by mouth every 6 (six) hours as needed for mild pain (Patient not taking: Reported on 1/25/2021), Disp: 30 tablet, Rfl: 0    albuterol (PROVENTIL HFA,VENTOLIN HFA) 90 mcg/act inhaler, Inhale 2 puffs every 4 (four) hours as needed for wheezing (Patient not taking: Reported on 1/25/2021), Disp: 1 Inhaler, Rfl: 0   ALPRAZolam (XANAX) 0 5 mg tablet, Take a half or 1 tablet twice a day as needed for anxiety  , Disp: 30 tablet, Rfl: 1    Ascorbic Acid (vitamin C) 1000 MG tablet, Take 1 tablet (1,000 mg total) by mouth daily for 7 days, Disp: 7 tablet, Rfl: 0    benzonatate (TESSALON PERLES) 100 mg capsule, Take 1 capsule (100 mg total) by mouth 3 (three) times a day as needed for cough (Patient not taking: Reported on 1/25/2021), Disp: 20 capsule, Rfl: 0    bismuth subsalicylate (PEPTO BISMOL) 262 MG chewable tablet, Chew 2 tablets (524 mg total) 4 (four) times a day (before meals and at bedtime) (Patient not taking: Reported on 1/25/2021), Disp: 30 tablet, Rfl: 0    cholecalciferol (VITAMIN D3) 1,000 units tablet, Take 2 tablets (2,000 Units total) by mouth daily (Patient not taking: Reported on 1/25/2021), Disp: 7 tablet, Rfl: 0    ergocalciferol (VITAMIN D2) 50,000 units, Take 1 capsule (50,000 Units total) by mouth every 28 days, Disp: 6 capsule, Rfl: 1    ferrous sulfate 325 (65 Fe) mg tablet, Take 1 tablet (325 mg total) by mouth every other day (Patient not taking: Reported on 1/25/2021), Disp: 30 tablet, Rfl: 0    fluticasone (FLONASE) 50 mcg/act nasal spray, 2 sprays into each nostril daily (Patient not taking: Reported on 1/25/2021), Disp: 48 mL, Rfl: 1    hydrocortisone 2 5 % cream, Apply topically 2 (two) times a day For 1 week only every month as needed (Patient not taking: Reported on 1/25/2021), Disp: 30 g, Rfl: 0    levothyroxine 75 mcg tablet, Take 1 tablet (75 mcg total) by mouth daily, Disp: 90 tablet, Rfl: 1    menthol-cetylpyridinium (CEPACOL) 3 MG lozenge, Take 1 lozenge (3 mg total) by mouth as needed for sore throat (Patient not taking: Reported on 1/25/2021), Disp: 30 lozenge, Rfl: 0    Multiple Vitamin (multivitamin) capsule, Take 1 capsule by mouth daily (Patient not taking: Reported on 1/25/2021), Disp: 7 capsule, Rfl: 0    naproxen (NAPROSYN) 500 mg tablet, One tablet twice a day with meals as needed for pain and the trigger finger (Patient not taking: Reported on 1/25/2021), Disp: 60 tablet, Rfl: 5    ondansetron (ZOFRAN-ODT) 4 mg disintegrating tablet, Take 1 tablet (4 mg total) by mouth every 8 (eight) hours as needed for nausea for up to 10 days, Disp: 20 tablet, Rfl: 0    PREMARIN vaginal cream, , Disp: , Rfl:     senna-docusate sodium (SENOKOT S) 8 6-50 mg per tablet, Take 1 tablet by mouth daily at bedtime (Patient not taking: Reported on 1/25/2021), Disp: 30 tablet, Rfl: 0    senna-docusate sodium (SENOKOT-S) 8 6-50 mg per tablet, Take 1 tablet by mouth daily (Patient not taking: Reported on 12/29/2020), Disp: 7 tablet, Rfl: 0    zinc sulfate (ZINCATE) 220 mg capsule, Take 1 capsule (220 mg total) by mouth daily for 3 doses, Disp: 3 capsule, Rfl: 0    HPI    The following portions of the patient's history were reviewed and updated as appropriate: allergies, current medications, past family history, past medical history, past social history, past surgical history and problem list     Review of Systems   Constitutional: Negative  Negative for activity change, appetite change, fatigue, fever and unexpected weight change  HENT: Negative for congestion, ear pain, hearing loss, mouth sores, postnasal drip, rhinorrhea, sore throat, trouble swallowing and voice change  Eyes: Negative for pain, redness and visual disturbance  Respiratory: Negative for cough, chest tightness, shortness of breath and wheezing  Cardiovascular: Negative for chest pain, palpitations and leg swelling  Gastrointestinal: Negative for abdominal distention, abdominal pain, blood in stool, constipation, diarrhea and nausea  Endocrine: Negative for cold intolerance, heat intolerance, polydipsia, polyphagia and polyuria  Genitourinary: Negative for difficulty urinating, dysuria, flank pain, frequency, hematuria and urgency     Musculoskeletal: Negative for arthralgias, back pain, gait problem, joint swelling and myalgias  Skin: Negative for color change and pallor  Neurological: Negative for dizziness, tremors, seizures, syncope, weakness, numbness and headaches  Hematological: Negative for adenopathy  Does not bruise/bleed easily  Psychiatric/Behavioral: Negative  Negative for sleep disturbance  The patient is not nervous/anxious  Objective: There were no vitals taken for this visit  Physical Exam  Vitals signs and nursing note reviewed  Constitutional:       Appearance: She is well-developed  HENT:      Head: Normocephalic  Right Ear: External ear normal       Left Ear: External ear normal       Nose: Nose normal       Mouth/Throat:      Pharynx: No oropharyngeal exudate  Eyes:      Conjunctiva/sclera: Conjunctivae normal       Pupils: Pupils are equal, round, and reactive to light  Neck:      Musculoskeletal: Normal range of motion and neck supple  Thyroid: No thyromegaly  Cardiovascular:      Rate and Rhythm: Normal rate and regular rhythm  Heart sounds: Normal heart sounds  No murmur  No friction rub  No gallop  Comments: S1-S2 regular rhythm  Extremities no edema  Pulmonary:      Effort: Pulmonary effort is normal  No respiratory distress  Breath sounds: Normal breath sounds  No wheezing or rales  Comments: Lungs are clear no wheezing rales or rhonchi  Abdominal:      General: Bowel sounds are normal  There is no distension  Palpations: Abdomen is soft  There is no mass  Tenderness: There is no abdominal tenderness  There is no guarding or rebound  Musculoskeletal: Normal range of motion  Lymphadenopathy:      Cervical: No cervical adenopathy  Skin:     General: Skin is warm and dry  Neurological:      Mental Status: She is alert and oriented to person, place, and time     Psychiatric:         Behavior: Behavior normal          Judgment: Judgment normal

## 2021-03-10 NOTE — PATIENT INSTRUCTIONS
That your feeling better your pulmonary function studies normal  Your blood pressure is normal    Get her COVID vaccine when the opportunity comes you had COVID a infection in December but he will be okay to get it this month in March

## 2021-04-09 DIAGNOSIS — Z23 ENCOUNTER FOR IMMUNIZATION: ICD-10-CM

## 2021-07-19 ENCOUNTER — APPOINTMENT (OUTPATIENT)
Dept: LAB | Facility: HOSPITAL | Age: 66
End: 2021-07-19
Payer: COMMERCIAL

## 2021-07-19 DIAGNOSIS — E03.9 ACQUIRED HYPOTHYROIDISM: ICD-10-CM

## 2021-07-19 LAB — TSH SERPL DL<=0.05 MIU/L-ACNC: 0.88 UIU/ML (ref 0.47–4.68)

## 2021-07-19 PROCEDURE — 84443 ASSAY THYROID STIM HORMONE: CPT

## 2021-07-19 PROCEDURE — 36415 COLL VENOUS BLD VENIPUNCTURE: CPT

## 2021-08-13 DIAGNOSIS — E03.4 HYPOTHYROIDISM DUE TO ACQUIRED ATROPHY OF THYROID: ICD-10-CM

## 2021-08-13 RX ORDER — LEVOTHYROXINE SODIUM 0.07 MG/1
TABLET ORAL
Qty: 90 TABLET | Refills: 1 | Status: SHIPPED | OUTPATIENT
Start: 2021-08-13 | End: 2022-03-08

## 2021-08-31 ENCOUNTER — OFFICE VISIT (OUTPATIENT)
Dept: DENTISTRY | Facility: CLINIC | Age: 66
End: 2021-08-31

## 2021-08-31 VITALS — SYSTOLIC BLOOD PRESSURE: 151 MMHG | TEMPERATURE: 95.5 F | HEART RATE: 97 BPM | DIASTOLIC BLOOD PRESSURE: 77 MMHG

## 2021-08-31 DIAGNOSIS — K08.409 TOOTH MISSING: Primary | ICD-10-CM

## 2021-08-31 DIAGNOSIS — K08.109 TEETH MISSING: ICD-10-CM

## 2021-08-31 PROCEDURE — D0330 PANORAMIC RADIOGRAPHIC IMAGE: HCPCS | Performed by: DENTIST

## 2021-08-31 NOTE — PROGRESS NOTES
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  At this time, pt understands the risks of not getting a new crown, but does not want to proceed with replacing this crown at the time  PANO taken today  Bone heigh appears good on radiograph with lack of sinus involvement  Clinically, #12 area 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 that may need more grafting  Pt reports that she had some sort of bone graft when #12 was removed approximately 5 or 6 years ago  This may have been a socket preservation  Pt was given referral to generic OS for CBCT of #12 area for possible implant  She was reminded of the steps and general timeline for implant placement and the costs, which she said she can handle  Pt was advised to visit financial for exact details  Pt understands that she may need more bone graft and/or soft tissue surgery if not enough attached gingiva is present  Pt understands that her next appt is to plan the exact details of the implant surgery after CBCT interpretation  Then, implant #12 should be placed  Pt is also due for DARSHAN/prophy      NV: final implant #12 consultation/treatment planning session after CBCT is obtained

## 2021-10-12 ENCOUNTER — OFFICE VISIT (OUTPATIENT)
Dept: INTERNAL MEDICINE CLINIC | Facility: CLINIC | Age: 66
End: 2021-10-12
Payer: COMMERCIAL

## 2021-10-12 VITALS
DIASTOLIC BLOOD PRESSURE: 76 MMHG | RESPIRATION RATE: 12 BRPM | TEMPERATURE: 97.6 F | BODY MASS INDEX: 27.42 KG/M2 | HEIGHT: 59 IN | WEIGHT: 136 LBS | HEART RATE: 84 BPM | SYSTOLIC BLOOD PRESSURE: 156 MMHG

## 2021-10-12 DIAGNOSIS — I45.4: ICD-10-CM

## 2021-10-12 DIAGNOSIS — E03.9 ACQUIRED HYPOTHYROIDISM: Primary | ICD-10-CM

## 2021-10-12 DIAGNOSIS — E78.00 PURE HYPERCHOLESTEROLEMIA: ICD-10-CM

## 2021-10-12 DIAGNOSIS — R21 RASH: ICD-10-CM

## 2021-10-12 DIAGNOSIS — I10 ESSENTIAL HYPERTENSION: ICD-10-CM

## 2021-10-12 DIAGNOSIS — Z12.31 ENCOUNTER FOR SCREENING MAMMOGRAM FOR MALIGNANT NEOPLASM OF BREAST: ICD-10-CM

## 2021-10-12 DIAGNOSIS — J12.82 PNEUMONIA DUE TO COVID-19 VIRUS: ICD-10-CM

## 2021-10-12 DIAGNOSIS — U07.1 PNEUMONIA DUE TO COVID-19 VIRUS: ICD-10-CM

## 2021-10-12 DIAGNOSIS — Z12.11 SCREENING FOR COLON CANCER: ICD-10-CM

## 2021-10-12 DIAGNOSIS — Z23 ENCOUNTER FOR IMMUNIZATION: ICD-10-CM

## 2021-10-12 DIAGNOSIS — F41.8 DEPRESSION WITH ANXIETY: ICD-10-CM

## 2021-10-12 DIAGNOSIS — E55.9 VITAMIN D DEFICIENCY: ICD-10-CM

## 2021-10-12 PROBLEM — J96.01 ACUTE RESPIRATORY FAILURE WITH HYPOXIA (HCC): Status: RESOLVED | Noted: 2020-12-29 | Resolved: 2021-10-12

## 2021-10-12 PROCEDURE — 1101F PT FALLS ASSESS-DOCD LE1/YR: CPT | Performed by: INTERNAL MEDICINE

## 2021-10-12 PROCEDURE — 1036F TOBACCO NON-USER: CPT | Performed by: INTERNAL MEDICINE

## 2021-10-12 PROCEDURE — 99397 PER PM REEVAL EST PAT 65+ YR: CPT | Performed by: INTERNAL MEDICINE

## 2021-10-12 PROCEDURE — 3008F BODY MASS INDEX DOCD: CPT | Performed by: INTERNAL MEDICINE

## 2021-10-12 PROCEDURE — 90662 IIV NO PRSV INCREASED AG IM: CPT | Performed by: INTERNAL MEDICINE

## 2021-10-12 PROCEDURE — 3288F FALL RISK ASSESSMENT DOCD: CPT | Performed by: INTERNAL MEDICINE

## 2021-10-12 PROCEDURE — 90471 IMMUNIZATION ADMIN: CPT | Performed by: INTERNAL MEDICINE

## 2021-10-12 RX ORDER — OLMESARTAN MEDOXOMIL 5 MG/1
5 TABLET ORAL DAILY
Qty: 30 TABLET | Refills: 5 | Status: SHIPPED | OUTPATIENT
Start: 2021-10-12 | End: 2021-11-11 | Stop reason: SDUPTHER

## 2021-10-12 RX ORDER — OLMESARTAN MEDOXOMIL 5 MG/1
5 TABLET ORAL DAILY
Qty: 30 TABLET | Refills: 5 | Status: SHIPPED | OUTPATIENT
Start: 2021-10-12 | End: 2021-10-12 | Stop reason: SDUPTHER

## 2021-10-12 RX ORDER — ADHESIVE BANDAGE 3/4"
BANDAGE TOPICAL DAILY
Qty: 1 EACH | Refills: 0 | Status: SHIPPED | OUTPATIENT
Start: 2021-10-12

## 2021-10-12 RX ORDER — ADHESIVE BANDAGE 3/4"
BANDAGE TOPICAL DAILY
Qty: 1 EACH | Refills: 0 | Status: SHIPPED | OUTPATIENT
Start: 2021-10-12 | End: 2021-10-12 | Stop reason: SDUPTHER

## 2021-10-28 ENCOUNTER — HOSPITAL ENCOUNTER (EMERGENCY)
Facility: HOSPITAL | Age: 66
Discharge: HOME | End: 2021-10-28
Attending: EMERGENCY MEDICINE | Admitting: EMERGENCY MEDICINE
Payer: COMMERCIAL

## 2021-10-28 ENCOUNTER — APPOINTMENT (EMERGENCY)
Dept: CT IMAGING | Facility: HOSPITAL | Age: 66
End: 2021-10-28
Payer: COMMERCIAL

## 2021-10-28 ENCOUNTER — APPOINTMENT (EMERGENCY)
Dept: RADIOLOGY | Facility: HOSPITAL | Age: 66
End: 2021-10-28
Payer: COMMERCIAL

## 2021-10-28 VITALS
TEMPERATURE: 96.5 F | HEART RATE: 74 BPM | DIASTOLIC BLOOD PRESSURE: 81 MMHG | SYSTOLIC BLOOD PRESSURE: 136 MMHG | BODY MASS INDEX: 30.1 KG/M2 | RESPIRATION RATE: 14 BRPM | OXYGEN SATURATION: 100 % | WEIGHT: 149.03 LBS

## 2021-10-28 DIAGNOSIS — I10 HYPERTENSION: ICD-10-CM

## 2021-10-28 DIAGNOSIS — R42 DIZZINESS: Primary | ICD-10-CM

## 2021-10-28 LAB
ALBUMIN SERPL BCP-MCNC: 4.3 G/DL (ref 3–5.2)
ALP SERPL-CCNC: 123 U/L (ref 43–122)
ALT SERPL W P-5'-P-CCNC: 28 U/L
ANION GAP SERPL CALCULATED.3IONS-SCNC: 2 MMOL/L (ref 5–14)
AST SERPL W P-5'-P-CCNC: 36 U/L (ref 14–36)
ATRIAL RATE: 73 BPM
BASOPHILS # BLD AUTO: 0 THOUSANDS/ΜL (ref 0–0.1)
BASOPHILS NFR BLD AUTO: 1 % (ref 0–1)
BILIRUB SERPL-MCNC: 0.62 MG/DL
BILIRUB UR QL STRIP: NEGATIVE
BUN SERPL-MCNC: 23 MG/DL (ref 5–25)
CALCIUM SERPL-MCNC: 9.8 MG/DL (ref 8.4–10.2)
CHLORIDE SERPL-SCNC: 106 MMOL/L (ref 97–108)
CLARITY UR: CLEAR
CO2 SERPL-SCNC: 30 MMOL/L (ref 22–30)
COLOR UR: NORMAL
CREAT SERPL-MCNC: 0.75 MG/DL (ref 0.6–1.2)
EOSINOPHIL # BLD AUTO: 0.1 THOUSAND/ΜL (ref 0–0.4)
EOSINOPHIL NFR BLD AUTO: 1 % (ref 0–6)
ERYTHROCYTE [DISTWIDTH] IN BLOOD BY AUTOMATED COUNT: 14.2 %
GFR SERPL CREATININE-BSD FRML MDRD: 84 ML/MIN/1.73SQ M
GLUCOSE SERPL-MCNC: 89 MG/DL (ref 70–99)
GLUCOSE UR STRIP-MCNC: NEGATIVE MG/DL
HCT VFR BLD AUTO: 38.2 % (ref 36–46)
HGB BLD-MCNC: 12.4 G/DL (ref 12–16)
HGB UR QL STRIP.AUTO: NEGATIVE
KETONES UR STRIP-MCNC: NEGATIVE MG/DL
LEUKOCYTE ESTERASE UR QL STRIP: NEGATIVE
LYMPHOCYTES # BLD AUTO: 2.7 THOUSANDS/ΜL (ref 0.5–4)
LYMPHOCYTES NFR BLD AUTO: 30 % (ref 25–45)
MCH RBC QN AUTO: 26.9 PG (ref 26–34)
MCHC RBC AUTO-ENTMCNC: 32.6 G/DL (ref 31–36)
MCV RBC AUTO: 83 FL (ref 80–100)
MONOCYTES # BLD AUTO: 0.8 THOUSAND/ΜL (ref 0.2–0.9)
MONOCYTES NFR BLD AUTO: 8 % (ref 1–10)
NEUTROPHILS # BLD AUTO: 5.5 THOUSANDS/ΜL (ref 1.8–7.8)
NEUTS SEG NFR BLD AUTO: 60 % (ref 45–65)
NITRITE UR QL STRIP: NEGATIVE
P AXIS: 50 DEGREES
PH UR STRIP.AUTO: 7 [PH]
PLATELET # BLD AUTO: 270 THOUSANDS/UL (ref 150–450)
PMV BLD AUTO: 10.6 FL (ref 8.9–12.7)
POTASSIUM SERPL-SCNC: 4.1 MMOL/L (ref 3.6–5)
PR INTERVAL: 164 MS
PROT SERPL-MCNC: 8.1 G/DL (ref 5.9–8.4)
PROT UR STRIP-MCNC: NEGATIVE MG/DL
QRS AXIS: -21 DEGREES
QRSD INTERVAL: 134 MS
QT INTERVAL: 432 MS
QTC INTERVAL: 475 MS
RBC # BLD AUTO: 4.63 MILLION/UL (ref 4–5.2)
SODIUM SERPL-SCNC: 138 MMOL/L (ref 137–147)
SP GR UR STRIP.AUTO: 1 (ref 1–1.04)
T WAVE AXIS: 51 DEGREES
TROPONIN I SERPL-MCNC: <0.01 NG/ML (ref 0–0.03)
UROBILINOGEN UA: NEGATIVE MG/DL
VENTRICULAR RATE: 73 BPM
WBC # BLD AUTO: 9.1 THOUSAND/UL (ref 4.5–11)

## 2021-10-28 PROCEDURE — 93010 ELECTROCARDIOGRAM REPORT: CPT | Performed by: INTERNAL MEDICINE

## 2021-10-28 PROCEDURE — 99285 EMERGENCY DEPT VISIT HI MDM: CPT | Performed by: EMERGENCY MEDICINE

## 2021-10-28 PROCEDURE — 84484 ASSAY OF TROPONIN QUANT: CPT | Performed by: EMERGENCY MEDICINE

## 2021-10-28 PROCEDURE — 71045 X-RAY EXAM CHEST 1 VIEW: CPT

## 2021-10-28 PROCEDURE — 80053 COMPREHEN METABOLIC PANEL: CPT | Performed by: EMERGENCY MEDICINE

## 2021-10-28 PROCEDURE — 96361 HYDRATE IV INFUSION ADD-ON: CPT

## 2021-10-28 PROCEDURE — 36415 COLL VENOUS BLD VENIPUNCTURE: CPT | Performed by: EMERGENCY MEDICINE

## 2021-10-28 PROCEDURE — 96360 HYDRATION IV INFUSION INIT: CPT

## 2021-10-28 PROCEDURE — 85025 COMPLETE CBC W/AUTO DIFF WBC: CPT | Performed by: EMERGENCY MEDICINE

## 2021-10-28 PROCEDURE — 93005 ELECTROCARDIOGRAM TRACING: CPT

## 2021-10-28 PROCEDURE — 99284 EMERGENCY DEPT VISIT MOD MDM: CPT

## 2021-10-28 PROCEDURE — 70450 CT HEAD/BRAIN W/O DYE: CPT

## 2021-10-28 RX ORDER — MECLIZINE HYDROCHLORIDE 25 MG/1
25 TABLET ORAL 3 TIMES DAILY PRN
Qty: 30 TABLET | Refills: 0 | Status: SHIPPED | OUTPATIENT
Start: 2021-10-28

## 2021-10-28 RX ORDER — HYDRALAZINE HYDROCHLORIDE 20 MG/ML
10 INJECTION INTRAMUSCULAR; INTRAVENOUS ONCE
Status: DISCONTINUED | OUTPATIENT
Start: 2021-10-28 | End: 2021-10-28 | Stop reason: HOSPADM

## 2021-10-28 RX ORDER — MECLIZINE HCL 12.5 MG/1
50 TABLET ORAL ONCE
Status: COMPLETED | OUTPATIENT
Start: 2021-10-28 | End: 2021-10-28

## 2021-10-28 RX ADMIN — SODIUM CHLORIDE 1000 ML: 0.9 INJECTION, SOLUTION INTRAVENOUS at 12:19

## 2021-10-28 RX ADMIN — MECLIZINE 50 MG: 12.5 TABLET ORAL at 13:40

## 2021-11-11 ENCOUNTER — OFFICE VISIT (OUTPATIENT)
Dept: INTERNAL MEDICINE CLINIC | Facility: CLINIC | Age: 66
End: 2021-11-11
Payer: COMMERCIAL

## 2021-11-11 VITALS
RESPIRATION RATE: 16 BRPM | WEIGHT: 137 LBS | HEIGHT: 59 IN | HEART RATE: 100 BPM | BODY MASS INDEX: 27.62 KG/M2 | SYSTOLIC BLOOD PRESSURE: 118 MMHG | DIASTOLIC BLOOD PRESSURE: 60 MMHG | TEMPERATURE: 97.8 F

## 2021-11-11 DIAGNOSIS — E03.4 HYPOTHYROIDISM DUE TO ACQUIRED ATROPHY OF THYROID: Primary | ICD-10-CM

## 2021-11-11 DIAGNOSIS — I83.813 VARICOSE VEINS OF LOWER EXTREMITY WITH PAIN, BILATERAL: ICD-10-CM

## 2021-11-11 DIAGNOSIS — F41.8 DEPRESSION WITH ANXIETY: ICD-10-CM

## 2021-11-11 DIAGNOSIS — Z12.11 COLON CANCER SCREENING: ICD-10-CM

## 2021-11-11 DIAGNOSIS — I45.4: ICD-10-CM

## 2021-11-11 DIAGNOSIS — I10 ESSENTIAL HYPERTENSION: ICD-10-CM

## 2021-11-11 DIAGNOSIS — U07.1 COVID-19 VIRUS INFECTION: ICD-10-CM

## 2021-11-11 DIAGNOSIS — E78.00 PURE HYPERCHOLESTEROLEMIA: ICD-10-CM

## 2021-11-11 PROCEDURE — 3008F BODY MASS INDEX DOCD: CPT | Performed by: INTERNAL MEDICINE

## 2021-11-11 PROCEDURE — 1036F TOBACCO NON-USER: CPT | Performed by: INTERNAL MEDICINE

## 2021-11-11 PROCEDURE — 99214 OFFICE O/P EST MOD 30 MIN: CPT | Performed by: INTERNAL MEDICINE

## 2021-11-11 RX ORDER — OLMESARTAN MEDOXOMIL 5 MG/1
5 TABLET ORAL DAILY
Qty: 90 TABLET | Refills: 1 | Status: SHIPPED | OUTPATIENT
Start: 2021-11-11 | End: 2021-12-11

## 2021-11-16 ENCOUNTER — OFFICE VISIT (OUTPATIENT)
Dept: DENTISTRY | Facility: CLINIC | Age: 66
End: 2021-11-16

## 2021-11-16 VITALS — HEART RATE: 80 BPM | TEMPERATURE: 97.3 F | SYSTOLIC BLOOD PRESSURE: 112 MMHG | DIASTOLIC BLOOD PRESSURE: 72 MMHG

## 2021-11-16 DIAGNOSIS — Z01.21 ENCOUNTER FOR DENTAL EXAMINATION AND CLEANING WITH ABNORMAL FINDINGS: Primary | ICD-10-CM

## 2021-11-16 PROCEDURE — D1110 PROPHYLAXIS - ADULT: HCPCS | Performed by: DENTAL HYGIENIST

## 2021-11-16 PROCEDURE — D0120 PERIODIC ORAL EVALUATION - ESTABLISHED PATIENT: HCPCS | Performed by: DENTIST

## 2021-12-17 ENCOUNTER — HOSPITAL ENCOUNTER (OUTPATIENT)
Dept: MAMMOGRAPHY | Facility: CLINIC | Age: 66
Discharge: HOME | End: 2021-12-17
Payer: COMMERCIAL

## 2021-12-17 DIAGNOSIS — Z12.31 ENCOUNTER FOR SCREENING MAMMOGRAM FOR MALIGNANT NEOPLASM OF BREAST: ICD-10-CM

## 2021-12-17 PROCEDURE — 77067 SCR MAMMO BI INCL CAD: CPT

## 2021-12-17 PROCEDURE — 77063 BREAST TOMOSYNTHESIS BI: CPT

## 2022-02-03 ENCOUNTER — PREP FOR PROCEDURE (OUTPATIENT)
Dept: GASTROENTEROLOGY | Facility: AMBULARY SURGERY CENTER | Age: 67
End: 2022-02-03

## 2022-02-03 ENCOUNTER — TELEPHONE (OUTPATIENT)
Dept: GASTROENTEROLOGY | Facility: AMBULARY SURGERY CENTER | Age: 67
End: 2022-02-03

## 2022-02-03 DIAGNOSIS — Z12.11 SPECIAL SCREENING FOR MALIGNANT NEOPLASMS, COLON: Primary | ICD-10-CM

## 2022-02-03 NOTE — TELEPHONE ENCOUNTER
Patient is scheduled for colonoscopy on February 15 , 2022 at [address] [facility] with Rowan Sinclair  Patient is aware of pre-procedure prep of Miralax/ Magnesium Citrate and they will be called the day prior between 2 and 6 pm for time to report for procedure  Pre-procedure prep has been given to the patient  via [address] [facility] mail and e-mail on February 3 , 2022

## 2022-03-11 DIAGNOSIS — E03.4 HYPOTHYROIDISM DUE TO ACQUIRED ATROPHY OF THYROID: ICD-10-CM

## 2022-03-11 RX ORDER — LEVOTHYROXINE SODIUM 0.07 MG/1
75 TABLET ORAL DAILY
Qty: 90 TABLET | Refills: 0 | Status: SHIPPED | OUTPATIENT
Start: 2022-03-11 | End: 2022-03-15 | Stop reason: SDUPTHER

## 2022-03-15 DIAGNOSIS — E03.4 HYPOTHYROIDISM DUE TO ACQUIRED ATROPHY OF THYROID: ICD-10-CM

## 2022-03-15 RX ORDER — LEVOTHYROXINE SODIUM 0.07 MG/1
75 TABLET ORAL DAILY
Qty: 90 TABLET | Refills: 0 | Status: SHIPPED | OUTPATIENT
Start: 2022-03-15

## 2022-10-12 PROBLEM — U07.1 PNEUMONIA DUE TO COVID-19 VIRUS: Status: RESOLVED | Noted: 2020-12-29 | Resolved: 2022-10-12

## 2022-10-12 PROBLEM — J12.82 PNEUMONIA DUE TO COVID-19 VIRUS: Status: RESOLVED | Noted: 2020-12-29 | Resolved: 2022-10-12

## 2023-01-30 ENCOUNTER — TELEPHONE (OUTPATIENT)
Dept: INTERNAL MEDICINE CLINIC | Facility: CLINIC | Age: 68
End: 2023-01-30

## 2023-01-30 NOTE — TELEPHONE ENCOUNTER
Called patient this afternoon to schedule an appointment and the patient stated, " I do not live there   I moved to [facility] "

## 2023-02-08 NOTE — TELEPHONE ENCOUNTER
02/08/23 4:27 PM        The office's request has been received, reviewed, and the patient chart updated  The PCP has successfully been removed with a patient attribution note  This message will now be completed          Thank you  Collette Lopez

## 2024-01-25 ENCOUNTER — OFFICE VISIT (OUTPATIENT)
Dept: INTERNAL MEDICINE CLINIC | Facility: CLINIC | Age: 69
End: 2024-01-25
Payer: COMMERCIAL

## 2024-01-25 VITALS
RESPIRATION RATE: 16 BRPM | HEART RATE: 115 BPM | OXYGEN SATURATION: 96 % | SYSTOLIC BLOOD PRESSURE: 120 MMHG | HEIGHT: 59 IN | DIASTOLIC BLOOD PRESSURE: 60 MMHG | WEIGHT: 135 LBS | TEMPERATURE: 98.5 F | BODY MASS INDEX: 27.21 KG/M2

## 2024-01-25 DIAGNOSIS — Z76.89 ENCOUNTER TO ESTABLISH CARE: Primary | ICD-10-CM

## 2024-01-25 DIAGNOSIS — I10 PRIMARY HYPERTENSION: ICD-10-CM

## 2024-01-25 DIAGNOSIS — Z00.00 ANNUAL PHYSICAL EXAM: ICD-10-CM

## 2024-01-25 DIAGNOSIS — E03.4 HYPOTHYROIDISM DUE TO ACQUIRED ATROPHY OF THYROID: ICD-10-CM

## 2024-01-25 DIAGNOSIS — E78.5 DYSLIPIDEMIA: ICD-10-CM

## 2024-01-25 DIAGNOSIS — E55.9 VITAMIN D DEFICIENCY: ICD-10-CM

## 2024-01-25 DIAGNOSIS — F41.8 DEPRESSION WITH ANXIETY: ICD-10-CM

## 2024-01-25 PROBLEM — U07.1 COVID-19 VIRUS INFECTION: Status: RESOLVED | Noted: 2021-01-04 | Resolved: 2024-01-25

## 2024-01-25 PROBLEM — R74.8 ELEVATED CK: Status: RESOLVED | Noted: 2020-12-29 | Resolved: 2024-01-25

## 2024-01-25 PROBLEM — R55 NEAR SYNCOPE: Status: RESOLVED | Noted: 2017-03-21 | Resolved: 2024-01-25

## 2024-01-25 PROCEDURE — 99214 OFFICE O/P EST MOD 30 MIN: CPT | Performed by: STUDENT IN AN ORGANIZED HEALTH CARE EDUCATION/TRAINING PROGRAM

## 2024-01-25 RX ORDER — OLMESARTAN MEDOXOMIL 5 MG/1
5 TABLET ORAL DAILY
Qty: 90 TABLET | Refills: 1 | Status: SHIPPED | OUTPATIENT
Start: 2024-01-25 | End: 2024-04-24

## 2024-01-25 NOTE — ASSESSMENT & PLAN NOTE
Reports this is no longer an active problem.  At the time of diagnosis her son was very ill which understandably was very difficult to cope with.  During that time was using Xanax 0.5 mg as needed, which she has not taken since she moved to [place].   -She is hopeful about the future and reports her symptoms are in remission  -Will follow-up during future visits

## 2024-01-25 NOTE — PROGRESS NOTES
"INTERNAL MEDICINE OFFICE VISIT NOTE  [facility] Medicine [place]    NAME: Leatha Wells  AGE: 68 y.o. SEX: female    DATE OF ENCOUNTER: 1/25/2024       Chief Complaint   Patient presents with    Establish Care     Previously following with Dr. Marroquin  She has been living in [place] and was following with a PCP there. Last visit in July 2023.     Previously diagnosed with, but not limited to:    Mental Health hx: Depression with anxiety    Significant FHx: Colon Ca, Breast Ca   Shx: Denies former or current - Tobacco, vaping, marijuana, illict drug use  Alcohol use: Rarely   Works -    - 25y,  Kids: 3 adults: one passed away a few years ago, other 2 live in [place].     Labs or imaging reports: Reviewed      Review of Systems  10 point ROS negative except per HPI    OBJECTIVE:  Vitals:    01/25/24 1600   BP: 120/60   BP Location: Left arm   Patient Position: Sitting   Cuff Size: Standard   Pulse: (!) 115   Resp: 16   Temp: 98.5 °F (36.9 °C)   SpO2: 96%   Weight: 61.2 kg (135 lb)   Height: 4' 11\" (1.499 m)       Physical Exam:   GENERAL: NAD, Normal appearance.   NEUROLOGIC:  Alert/oriented x3.  HEENT:  NC/AT, no scleral icterus  CARDIAC:  RRR, +S1/S2  PULMONARY:  non-labored breathing      ASSESSMENT/PLAN:    1. Encounter to establish care    2. Primary hypertension  Assessment & Plan:  Controlled on olmesartan 5 mg-continue    Orders:  -     olmesartan (BENICAR) 5 mg tablet; Take 1 tablet (5 mg total) by mouth daily  -     Comprehensive metabolic panel; Future    3. Annual physical exam  -     CBC; Future  -     Comprehensive metabolic panel; Future  -     TSH, 3rd generation with Free T4 reflex; Future  -     Hemoglobin A1C; Future  -     Lipid Panel with Direct LDL reflex; Future    4. Hypothyroidism due to acquired atrophy of thyroid  Assessment & Plan:  Currently on levothyroxine 75 mcg daily-continue  TSH ordered    Orders:  -     TSH, 3rd generation with Free T4 reflex; " Future    5. Vitamin D deficiency    6. Dyslipidemia  Assessment & Plan:  Reports she is currently on a medication for her cholesterol but she is unable to recall the name.  I requested that she brings her medications her next visit.  Lipid panel ordered      7. Depression with anxiety  Assessment & Plan:  Reports this is no longer an active problem.  At the time of diagnosis her son was very ill which understandably was very difficult to cope with.  During that time was using Xanax 0.5 mg as needed, which she has not taken since she moved to [place].   -She is hopeful about the future and reports her symptoms are in remission  -Will follow-up during future visits            Current Outpatient Medications:     acetaminophen (TYLENOL) 500 mg tablet, Take 1 tablet (500 mg total) by mouth every 6 (six) hours as needed for mild pain, Disp: 30 tablet, Rfl: 0    albuterol (PROVENTIL HFA,VENTOLIN HFA) 90 mcg/act inhaler, Inhale 2 puffs every 4 (four) hours as needed for wheezing, Disp: 1 Inhaler, Rfl: 0    ALPRAZolam (XANAX) 0.5 mg tablet, Take a half or 1 tablet twice a day as needed for anxiety., Disp: 30 tablet, Rfl: 1    betamethasone valerate (VALISONE) 0.1 % cream, Apply topically 2 (two) times a day, Disp: 30 g, Rfl: 0    hydrocortisone 2.5 % cream, Apply topically 2 (two) times a day For 1 week only every month as needed, Disp: 30 g, Rfl: 0    naproxen (NAPROSYN) 500 mg tablet, One tablet twice a day with meals as needed for pain and the trigger finger, Disp: 60 tablet, Rfl: 5    olmesartan (BENICAR) 5 mg tablet, Take 1 tablet (5 mg total) by mouth daily, Disp: 90 tablet, Rfl: 1    Ascorbic Acid (vitamin C) 1000 MG tablet, Take 1 tablet (1,000 mg total) by mouth daily for 7 days, Disp: 7 tablet, Rfl: 0    Blood Pressure Monitoring (Blood Pressure Cuff) MISC, Use daily Take 2 times a week at different times a day, Disp: 1 each, Rfl: 0    ergocalciferol (VITAMIN D2) 50,000 units, Take 1 capsule (50,000 Units total)  by mouth every 28 days, Disp: 6 capsule, Rfl: 1    fluticasone (FLONASE) 50 mcg/act nasal spray, 2 sprays into each nostril daily, Disp: 48 mL, Rfl: 1    levothyroxine 75 mcg tablet, Take 1 tablet (75 mcg total) by mouth daily, Disp: 90 tablet, Rfl: 0    meclizine (ANTIVERT) 25 mg tablet, Take 1 tablet (25 mg total) by mouth 3 (three) times a day as needed for dizziness (Patient not taking: Reported on 11/16/2021), Disp: 30 tablet, Rfl: 0    TCM Call       Date and time call was made  1/4/2021  9:44 AM    Patient was hospitialized at  [facility] (formerly [facility])    Date of Admission  12/29/20    Date of discharge  01/02/21    Diagnosis  pneumonia due to COVID    Disposition  Home    Were the patients medications reviewed and updated  Yes    Current Symptoms  None          TCM Call       Should patient be enrolled in anticoag monitoring?  No    Scheduled for follow up?  Yes    I have advised the patient to call PCP with any new or worsening symptoms  Fadia Baptiste MD  [facility] Internal Medicine [place]    * Please Note: This note was completed in part utilizing a dictation software.  Grammatical errors, random word insertions, spelling mistakes, and incomplete sentences may be an occasional consequence of this system secondary to software limitations, ambient noise, and hardware issues.  If you have any questions or concerns about the content, text, or information contained within the body of this dictation, please contact the provider for clarification.**

## 2024-01-25 NOTE — ASSESSMENT & PLAN NOTE
Reports she is currently on a medication for her cholesterol but she is unable to recall the name.  I requested that she brings her medications her next visit.  Lipid panel ordered

## 2024-02-01 ENCOUNTER — RA CDI HCC (OUTPATIENT)
Dept: OTHER | Facility: HOSPITAL | Age: 69
End: 2024-02-01

## 2024-02-03 ENCOUNTER — APPOINTMENT (OUTPATIENT)
Dept: LAB | Facility: HOSPITAL | Age: 69
End: 2024-02-03
Payer: COMMERCIAL

## 2024-02-03 DIAGNOSIS — I10 PRIMARY HYPERTENSION: ICD-10-CM

## 2024-02-03 DIAGNOSIS — E03.4 HYPOTHYROIDISM DUE TO ACQUIRED ATROPHY OF THYROID: ICD-10-CM

## 2024-02-03 DIAGNOSIS — E55.9 VITAMIN D DEFICIENCY: ICD-10-CM

## 2024-02-03 DIAGNOSIS — Z00.00 ANNUAL PHYSICAL EXAM: ICD-10-CM

## 2024-02-03 LAB
25(OH)D3 SERPL-MCNC: 30.8 NG/ML (ref 30–100)
ALBUMIN SERPL BCP-MCNC: 4.2 G/DL (ref 3.5–5)
ALP SERPL-CCNC: 95 U/L (ref 34–104)
ALT SERPL W P-5'-P-CCNC: 39 U/L (ref 7–52)
ANION GAP SERPL CALCULATED.3IONS-SCNC: 6 MMOL/L
AST SERPL W P-5'-P-CCNC: 26 U/L (ref 13–39)
BILIRUB SERPL-MCNC: 0.58 MG/DL (ref 0.2–1)
BUN SERPL-MCNC: 26 MG/DL (ref 5–25)
CALCIUM SERPL-MCNC: 9.7 MG/DL (ref 8.4–10.2)
CHLORIDE SERPL-SCNC: 104 MMOL/L (ref 96–108)
CHOLEST SERPL-MCNC: 152 MG/DL
CO2 SERPL-SCNC: 30 MMOL/L (ref 21–32)
CREAT SERPL-MCNC: 0.94 MG/DL (ref 0.6–1.3)
ERYTHROCYTE [DISTWIDTH] IN BLOOD BY AUTOMATED COUNT: 14.2 % (ref 11.6–15.1)
GFR SERPL CREATININE-BSD FRML MDRD: 62 ML/MIN/1.73SQ M
GLUCOSE P FAST SERPL-MCNC: 93 MG/DL (ref 65–99)
HCT VFR BLD AUTO: 39.7 % (ref 34.8–46.1)
HDLC SERPL-MCNC: 83 MG/DL
HGB BLD-MCNC: 12.1 G/DL (ref 11.5–15.4)
LDLC SERPL CALC-MCNC: 62 MG/DL (ref 0–100)
MCH RBC QN AUTO: 25.2 PG (ref 26.8–34.3)
MCHC RBC AUTO-ENTMCNC: 30.5 G/DL (ref 31.4–37.4)
MCV RBC AUTO: 83 FL (ref 82–98)
PLATELET # BLD AUTO: 264 THOUSANDS/UL (ref 149–390)
PMV BLD AUTO: 11.8 FL (ref 8.9–12.7)
POTASSIUM SERPL-SCNC: 4.7 MMOL/L (ref 3.5–5.3)
PROT SERPL-MCNC: 7.1 G/DL (ref 6.4–8.4)
RBC # BLD AUTO: 4.81 MILLION/UL (ref 3.81–5.12)
SODIUM SERPL-SCNC: 140 MMOL/L (ref 135–147)
TRIGL SERPL-MCNC: 34 MG/DL
TSH SERPL DL<=0.05 MIU/L-ACNC: 0.12 UIU/ML (ref 0.45–4.5)
WBC # BLD AUTO: 8.12 THOUSAND/UL (ref 4.31–10.16)

## 2024-02-03 PROCEDURE — 80053 COMPREHEN METABOLIC PANEL: CPT

## 2024-02-03 PROCEDURE — 80061 LIPID PANEL: CPT

## 2024-02-03 PROCEDURE — 83036 HEMOGLOBIN GLYCOSYLATED A1C: CPT

## 2024-02-03 PROCEDURE — 82306 VITAMIN D 25 HYDROXY: CPT

## 2024-02-03 PROCEDURE — 84443 ASSAY THYROID STIM HORMONE: CPT

## 2024-02-03 PROCEDURE — 85027 COMPLETE CBC AUTOMATED: CPT

## 2024-02-03 PROCEDURE — 36415 COLL VENOUS BLD VENIPUNCTURE: CPT

## 2024-02-03 PROCEDURE — 84439 ASSAY OF FREE THYROXINE: CPT

## 2024-02-04 LAB
EST. AVERAGE GLUCOSE BLD GHB EST-MCNC: 137 MG/DL
HBA1C MFR BLD: 6.4 %
T4 FREE SERPL-MCNC: 1.14 NG/DL (ref 0.61–1.12)

## 2024-02-08 ENCOUNTER — OFFICE VISIT (OUTPATIENT)
Dept: INTERNAL MEDICINE CLINIC | Facility: CLINIC | Age: 69
End: 2024-02-08
Payer: COMMERCIAL

## 2024-02-08 VITALS
HEIGHT: 59 IN | SYSTOLIC BLOOD PRESSURE: 118 MMHG | OXYGEN SATURATION: 96 % | TEMPERATURE: 97.6 F | DIASTOLIC BLOOD PRESSURE: 68 MMHG | HEART RATE: 80 BPM | BODY MASS INDEX: 27.58 KG/M2 | WEIGHT: 136.8 LBS

## 2024-02-08 DIAGNOSIS — L81.4 SOLAR LENTIGO: ICD-10-CM

## 2024-02-08 DIAGNOSIS — E55.9 VITAMIN D DEFICIENCY: ICD-10-CM

## 2024-02-08 DIAGNOSIS — E78.5 DYSLIPIDEMIA: ICD-10-CM

## 2024-02-08 DIAGNOSIS — E03.4 HYPOTHYROIDISM DUE TO ACQUIRED ATROPHY OF THYROID: ICD-10-CM

## 2024-02-08 DIAGNOSIS — Z78.0 POSTMENOPAUSAL: ICD-10-CM

## 2024-02-08 DIAGNOSIS — L30.8 OTHER ECZEMA: ICD-10-CM

## 2024-02-08 DIAGNOSIS — D50.9 IRON DEFICIENCY ANEMIA, UNSPECIFIED IRON DEFICIENCY ANEMIA TYPE: ICD-10-CM

## 2024-02-08 DIAGNOSIS — Z12.11 COLON CANCER SCREENING: Primary | ICD-10-CM

## 2024-02-08 DIAGNOSIS — Z12.31 ENCOUNTER FOR SCREENING MAMMOGRAM FOR BREAST CANCER: ICD-10-CM

## 2024-02-08 DIAGNOSIS — R73.01 IMPAIRED FASTING GLUCOSE: ICD-10-CM

## 2024-02-08 DIAGNOSIS — L30.9 ECZEMA, UNSPECIFIED TYPE: ICD-10-CM

## 2024-02-08 PROBLEM — M65.331 TRIGGER MIDDLE FINGER OF RIGHT HAND: Status: RESOLVED | Noted: 2020-04-21 | Resolved: 2024-02-08

## 2024-02-08 PROCEDURE — G0438 PPPS, INITIAL VISIT: HCPCS | Performed by: STUDENT IN AN ORGANIZED HEALTH CARE EDUCATION/TRAINING PROGRAM

## 2024-02-08 PROCEDURE — 99214 OFFICE O/P EST MOD 30 MIN: CPT | Performed by: STUDENT IN AN ORGANIZED HEALTH CARE EDUCATION/TRAINING PROGRAM

## 2024-02-08 RX ORDER — TRETINOIN 0.1 MG/G
GEL TOPICAL
Qty: 45 G | Refills: 1 | Status: SHIPPED | OUTPATIENT
Start: 2024-02-08 | End: 2024-02-08

## 2024-02-08 RX ORDER — LEVOTHYROXINE SODIUM 0.07 MG/1
TABLET ORAL
Start: 2024-02-08

## 2024-02-08 RX ORDER — ROSUVASTATIN CALCIUM 20 MG/1
20 TABLET, COATED ORAL DAILY
COMMUNITY

## 2024-02-08 NOTE — ASSESSMENT & PLAN NOTE
Lab Results   Component Value Date    [id number] 30.8 02/03/2024    [id number] 32.2 05/29/2020     currently on daily supplementation with a multivitamin with vitamin D-continue

## 2024-02-08 NOTE — ASSESSMENT & PLAN NOTE
Lab Results   Component Value Date    HGBA1C 6.4 (H) 02/03/2024     This is a new diagnosis  Agreed not to proceed with any medication however we discussed importance of modifiable life factors including diet and exercise to prevent progression to diabetes  -Repeat an A1c in 3 months

## 2024-02-08 NOTE — PATIENT INSTRUCTIONS
Medicare Preventive Visit Patient Instructions  Thank you for completing your Welcome to Medicare Visit or Medicare Annual Wellness Visit today. Your next wellness visit will be due in one year (2/8/2025).  The screening/preventive services that you may require over the next 5-10 years are detailed below. Some tests may not apply to you based off risk factors and/or age. Screening tests ordered at today's visit but not completed yet may show as past due. Also, please note that scanned in results may not display below.  Preventive Screenings:  Service Recommendations Previous Testing/Comments   Colorectal Cancer Screening  * Colonoscopy    * Fecal Occult Blood Test (FOBT)/Fecal Immunochemical Test (FIT)  * Fecal DNA/Cologuard Test  * Flexible Sigmoidoscopy Age: 45-75 years old   Colonoscopy: every 10 years (may be performed more frequently if at higher risk)  OR  FOBT/FIT: every 1 year  OR  Cologuard: every 3 years  OR  Sigmoidoscopy: every 5 years  Screening may be recommended earlier than age 45 if at higher risk for colorectal cancer. Also, an individualized decision between you and your healthcare provider will decide whether screening between the ages of 76-85 would be appropriate. Colonoscopy: 03/17/2015  FOBT/FIT: Not on file  Cologuard: Not on file  Sigmoidoscopy: Not on file    Screening Current     Breast Cancer Screening Age: 40+ years old  Frequency: every 1-2 years  Not required if history of left and right mastectomy Mammogram: 12/17/2021        Cervical Cancer Screening Between the ages of 21-29, pap smear recommended once every 3 years.   Between the ages of 30-65, can perform pap smear with HPV co-testing every 5 years.   Recommendations may differ for women with a history of total hysterectomy, cervical cancer, or abnormal pap smears in past. Pap Smear: 10/02/2017    Screening Not Indicated   Hepatitis C Screening Once for adults born between 1945 and 1965  More frequently in patients at high risk  for Hepatitis C Hep C Antibody: 05/29/2020    Screening Current   Diabetes Screening 1-2 times per year if you're at risk for diabetes or have pre-diabetes Fasting glucose: 93 mg/dL (2/3/2024)  A1C: 6.4 % (2/3/2024)  Screening Current   Cholesterol Screening Once every 5 years if you don't have a lipid disorder. May order more often based on risk factors. Lipid panel: 02/03/2024    Screening Current     Other Preventive Screenings Covered by Medicare:  Abdominal Aortic Aneurysm (AAA) Screening: covered once if your at risk. You're considered to be at risk if you have a family history of AAA.  Lung Cancer Screening: covers low dose CT scan once per year if you meet all of the following conditions: (1) Age 55-77; (2) No signs or symptoms of lung cancer; (3) Current smoker or have quit smoking within the last 15 years; (4) You have a tobacco smoking history of at least 20 pack years (packs per day multiplied by number of years you smoked); (5) You get a written order from a healthcare provider.  Glaucoma Screening: covered annually if you're considered high risk: (1) You have diabetes OR (2) Family history of glaucoma OR (3)  aged 50 and older OR (4)  American aged 65 and older  Osteoporosis Screening: covered every 2 years if you meet one of the following conditions: (1) You're estrogen deficient and at risk for osteoporosis based off medical history and other findings; (2) Have a vertebral abnormality; (3) On glucocorticoid therapy for more than 3 months; (4) Have primary hyperparathyroidism; (5) On osteoporosis medications and need to assess response to drug therapy.   Last bone density test (DXA Scan): Not on file.  HIV Screening: covered annually if you're between the age of 15-65. Also covered annually if you are younger than 15 and older than 65 with risk factors for HIV infection. For pregnant patients, it is covered up to 3 times per pregnancy.    Immunizations:  Immunization  Recommendations   Influenza Vaccine Annual influenza vaccination during flu season is recommended for all persons aged >= 6 months who do not have contraindications   Pneumococcal Vaccine   * Pneumococcal conjugate vaccine = PCV13 (Prevnar 13), PCV15 (Vaxneuvance), PCV20 (Prevnar 20)  * Pneumococcal polysaccharide vaccine = PPSV23 (Pneumovax) Adults 19-65 yo with certain risk factors or if 65+ yo  If never received any pneumonia vaccine: recommend Prevnar 20 (PCV20)  Give PCV20 if previously received 1 dose of PCV13 or PPSV23   Hepatitis B Vaccine 3 dose series if at intermediate or high risk (ex: diabetes, end stage renal disease, liver disease)   Respiratory syncytial virus (RSV) Vaccine - COVERED BY MEDICARE PART D  * RSVPreF3 (Arexvy) CDC recommends that adults 60 years of age and older may receive a single dose of RSV vaccine using shared clinical decision-making (SCDM)   Tetanus (Td) Vaccine - COST NOT COVERED BY MEDICARE PART B Following completion of primary series, a booster dose should be given every 10 years to maintain immunity against tetanus. Td may also be given as tetanus wound prophylaxis.   Tdap Vaccine - COST NOT COVERED BY MEDICARE PART B Recommended at least once for all adults. For pregnant patients, recommended with each pregnancy.   Shingles Vaccine (Shingrix) - COST NOT COVERED BY MEDICARE PART B  2 shot series recommended in those 19 years and older who have or will have weakened immune systems or those 50 years and older     Health Maintenance Due:      Topic Date Due   • Colorectal Cancer Screening  02/23/2018   • Breast Cancer Screening: Mammogram  12/17/2022   • Hepatitis C Screening  Completed     Immunizations Due:      Topic Date Due   • Pneumococcal Vaccine: 65+ Years (1 - PCV) Never done   • Influenza Vaccine (1) 09/01/2023   • COVID-19 Vaccine (3 - 2023-24 season) 09/01/2023     Advance Directives   What are advance directives?  Advance directives are legal documents that state  your wishes and plans for medical care. These plans are made ahead of time in case you lose your ability to make decisions for yourself. Advance directives can apply to any medical decision, such as the treatments you want, and if you want to donate organs.   What are the types of advance directives?  There are many types of advance directives, and each state has rules about how to use them. You may choose a combination of any of the following:  Living will:  This is a written record of the treatment you want. You can also choose which treatments you do not want, which to limit, and which to stop at a certain time. This includes surgery, medicine, IV fluid, and tube feedings.   Durable power of  for healthcare (DPAHC):  This is a written record that states who you want to make healthcare choices for you when you are unable to make them for yourself. This person, called a proxy, is usually a family member or a friend. You may choose more than 1 proxy.  Do not resuscitate (DNR) order:  A DNR order is used in case your heart stops beating or you stop breathing. It is a request not to have certain forms of treatment, such as CPR. A DNR order may be included in other types of advance directives.  Medical directive:  This covers the care that you want if you are in a coma, near death, or unable to make decisions for yourself. You can list the treatments you want for each condition. Treatment may include pain medicine, surgery, blood transfusions, dialysis, IV or tube feedings, and a ventilator (breathing machine).  Values history:  This document has questions about your views, beliefs, and how you feel and think about life. This information can help others choose the care that you would choose.  Why are advance directives important?  An advance directive helps you control your care. Although spoken wishes may be used, it is better to have your wishes written down. Spoken wishes can be misunderstood, or not  followed. Treatments may be given even if you do not want them. An advance directive may make it easier for your family to make difficult choices about your care.   Weight Management   Why it is important to manage your weight:  Being overweight increases your risk of health conditions such as heart disease, high blood pressure, type 2 diabetes, and certain types of cancer. It can also increase your risk for osteoarthritis, sleep apnea, and other respiratory problems. Aim for a slow, steady weight loss. Even a small amount of weight loss can lower your risk of health problems.  How to lose weight safely:  A safe and healthy way to lose weight is to eat fewer calories and get regular exercise. You can lose up about 1 pound a week by decreasing the number of calories you eat by 500 calories each day.   Healthy meal plan for weight management:  A healthy meal plan includes a variety of foods, contains fewer calories, and helps you stay healthy. A healthy meal plan includes the following:  Eat whole-grain foods more often.  A healthy meal plan should contain fiber. Fiber is the part of grains, fruits, and vegetables that is not broken down by your body. Whole-grain foods are healthy and provide extra fiber in your diet. Some examples of whole-grain foods are whole-wheat breads and pastas, oatmeal, brown rice, and bulgur.  Eat a variety of vegetables every day.  Include dark, leafy greens such as spinach, kale, alessia greens, and mustard greens. Eat yellow and orange vegetables such as carrots, sweet potatoes, and winter squash.   Eat a variety of fruits every day.  Choose fresh or canned fruit (canned in its own juice or light syrup) instead of juice. Fruit juice has very little or no fiber.  Eat low-fat dairy foods.  Drink fat-free (skim) milk or 1% milk. Eat fat-free yogurt and low-fat cottage cheese. Try low-fat cheeses such as mozzarella and other reduced-fat cheeses.  Choose meat and other protein foods that are  low in fat.  Choose beans or other legumes such as split peas or lentils. Choose fish, skinless poultry (chicken or turkey), or lean cuts of red meat (beef or pork). Before you cook meat or poultry, cut off any visible fat.   Use less fat and oil.  Try baking foods instead of frying them. Add less fat, such as margarine, sour cream, regular salad dressing and mayonnaise to foods. Eat fewer high-fat foods. Some examples of high-fat foods include french fries, doughnuts, ice cream, and cakes.  Eat fewer sweets.  Limit foods and drinks that are high in sugar. This includes candy, cookies, regular soda, and sweetened drinks.  Exercise:  Exercise at least 30 minutes per day on most days of the week. Some examples of exercise include walking, biking, dancing, and swimming. You can also fit in more physical activity by taking the stairs instead of the elevator or parking farther away from stores. Ask your healthcare provider about the best exercise plan for you.      © Copyright [employer] 2018 Information is for End User's use only and may not be sold, redistributed or otherwise used for commercial purposes. All illustrations and images included in CareNotes® are the copyrighted property of A.D.A.M., Inc. or [employer]

## 2024-02-08 NOTE — ASSESSMENT & PLAN NOTE
Lab Results   Component Value Date    LDLCALC 62 02/03/2024    LDLCALC 140 (H) 06/14/2019       Medication list clarified-she is currently on rosuvastatin 20 mg -continue

## 2024-02-08 NOTE — ASSESSMENT & PLAN NOTE
Localized to her neck  Reports previously responded to hydrocortisone cream -   -Started trial of hydrocortisone 2.5% cream

## 2024-02-08 NOTE — ASSESSMENT & PLAN NOTE
Lab Results   Component Value Date    HGB 12.1 02/03/2024    MCV 83 02/03/2024    FERRITIN 46 02/08/2021    IRON 22 (L) 12/30/2020    CONCFE 8 12/30/2020    TIBC 261 12/30/2020      No evidence of anemia and most recent labs  Will Monitor yearly

## 2024-02-08 NOTE — ASSESSMENT & PLAN NOTE
Complains of progressively worsening facial macules  -Will proceed with trial of follow-up Retin-A 0.025%

## 2024-02-08 NOTE — ASSESSMENT & PLAN NOTE
Lab Results   Component Value Date    [id number] 0.123 (L) 02/03/2024    [id number] 0.882 07/19/2021    FREET4 1.14 (H) 02/03/2024    FREET4 1.26 02/08/2021      Tsh and T4 consistent with overdosing   - Decrease levothyroxine to 75 mcg 6 times a week

## 2024-02-08 NOTE — PROGRESS NOTES
Assessment and Plan:     Problem List Items Addressed This Visit          Endocrine    Hypothyroidism due to acquired atrophy of thyroid     Lab Results   Component Value Date    ZMK6FRJZUWFH 0.123 (L) 02/03/2024    CJR4AWBLJDBB 0.882 07/19/2021    FREET4 1.14 (H) 02/03/2024    FREET4 1.26 02/08/2021      Tsh and T4 consistent with overdosing   - Decrease levothyroxine to 75 mcg 6 times a week         Relevant Medications    levothyroxine 75 mcg tablet    Impaired fasting glucose     Lab Results   Component Value Date    HGBA1C 6.4 (H) 02/03/2024     This is a new diagnosis  Agreed not to proceed with any medication however we discussed importance of modifiable life factors including diet and exercise to prevent progression to diabetes  -Repeat an A1c in 3 months            Musculoskeletal and Integument    Eczema     Localized to her neck  Reports previously responded to hydrocortisone cream -   -Started trial of hydrocortisone 2.5% cream         Relevant Medications    hydrocortisone 2.5 % cream    tretinoin (RETIN-A) 0.025 % cream    Solar lentigo     Complains of progressively worsening facial macules  -Will proceed with trial of follow-up Retin-A 0.025%         Relevant Medications    hydrocortisone 2.5 % cream    tretinoin (RETIN-A) 0.025 % cream       Other    Vitamin D deficiency     Lab Results   Component Value Date    QZFS92JNFJOC 30.8 02/03/2024    TPOB78KPFVPM 32.2 05/29/2020     currently on daily supplementation with a multivitamin with vitamin D-continue           Dyslipidemia     Lab Results   Component Value Date    LDLCALC 62 02/03/2024    LDLCALC 140 (H) 06/14/2019       Medication list clarified-she is currently on rosuvastatin 20 mg -continue           RESOLVED: Iron deficiency anemia     Lab Results   Component Value Date    HGB 12.1 02/03/2024    MCV 83 02/03/2024    FERRITIN 46 02/08/2021    IRON 22 (L) 12/30/2020    CONCFE 8 12/30/2020    TIBC 261 12/30/2020      No evidence of anemia and  most recent labs  Will Monitor yearly          Other Visit Diagnoses       Colon cancer screening    -  Primary    Relevant Orders    Ambulatory Referral to General Surgery    Encounter for screening mammogram for breast cancer        Relevant Orders    Mammo screening bilateral w 3d & cad    Postmenopausal        Relevant Orders    DXA bone density spine hip and pelvis             Preventive health issues were discussed with patient, and age appropriate screening tests were ordered as noted in patient's After Visit Summary.  Personalized health advice and appropriate referrals for health education or preventive services given if needed, as noted in patient's After Visit Summary.     History of Present Illness:     Patient presents for a Medicare Wellness Visit    HPI   Patient Care Team:  Kurt Baptiste MD as PCP - General (Internal Medicine)  MD Nicola Leigh MD Harshini Dhananjay, DO Doron Rabin, MD Jose Ramon Garcia, MD Hazel Tuazon, MD     Review of Systems:     Review of Systems     Problem List:     Patient Active Problem List   Diagnosis    Depression with anxiety    Chronic bundle branch block    Hypothyroidism due to acquired atrophy of thyroid    Primary hypertension    Lipodystrophy of abdomen    Trigger middle finger of right hand    Vitamin D deficiency    Allergic rhinitis    Iron deficiency anemia    h/o Complete tear of left rotator cuff    Eczema of external ear, left    Dyslipidemia    Microscopic hematuria    Spondylosis    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, bilateral      Past Medical and Surgical History:     Past Medical History:   Diagnosis Date    Adhesive capsulitis of left shoulder     Last assessed - 2/16/15    Allergic 2023    Itching comes and go    Allergic rhinitis     Last assessed - 3/17/15    Anxiety     Back pain     Disease of thyroid gland     hypo    Hyperlipidemia     borderline , no meds    Hypertension 2022    LBBB (left  bundle branch block)     chronic    Migraine     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 for tuberculosis     Psychiatric disorder     Vitamin D deficiency      Past Surgical History:   Procedure Laterality Date    BREAST CYST EXCISION Left 1990    benign    BREAST CYST EXCISION Right     benign    BREAST CYST EXCISION Left     benign    COLONOSCOPY      HYSTERECTOMY      [place] UNLISTED PX SKIN MUC MEMBRANE & SUBQ TISSUE N/A 7/25/2019    Procedure: ABDOMINOPLASTY WITH LIPOSUCTION;  Surgeon: Anselmo Patel MD;  Location:  MAIN OR;  Service: Plastics    SHOULDER ARTHROSCOPY      Last assessed - 12/23/14    SHOULDER ARTHROSCOPY W/ ROTATOR CUFF REPAIR      Last assessed - 4/4/16      Family History:     Family History   Problem Relation Age of Onset    Cancer Mother     Depression Mother     Breast cancer Mother 75    Depression Sister     Diabetes Sister     Thyroid disease Sister         Disorder    Cancer Sister 36        neck lymph nodes    Cancer Son     Kidney cancer Son     Heart attack Maternal Grandmother     Liver cancer Maternal Grandmother     Stroke Maternal Grandfather     Cancer Family     Hypertension Family         Essential     Cancer Other     Thyroid disease Daughter         Disorder    No Known Problems Paternal Grandmother     Breast cancer Sister 59    No Known Problems Sister     No Known Problems Sister     Breast cancer Maternal Aunt     No Known Problems Paternal Aunt     No Known Problems Paternal Aunt     No Known Problems Paternal Aunt       Social History:     Social History     Socioeconomic History    Marital status: /Civil Union     Spouse name: None    Number of children: 4    Years of education: HS or GED    Highest education level: None   Occupational History    Occupation: Unemployed    Tobacco Use    Smoking status: Never    Smokeless tobacco: Never   Vaping Use    Vaping status: Never Used   Substance and Sexual Activity    Alcohol use: Never    Drug use: No    Sexual activity: Not Currently    Other Topics Concern    None   Social History Narrative    Functioning activity level - participates in sedentary/light activities outside of the home and moderate activities inside of the home.    Lives with her partner and her 13y/o son.     Social Determinants of Health     Financial Resource Strain: Low Risk  (2/8/2024)    Overall Financial Resource Strain (CARDIA)     Difficulty of Paying Living Expenses: Not hard at all   Food Insecurity: Not on file   Transportation Needs: No Transportation Needs (2/8/2024)    PRAPARE - Transportation     Lack of Transportation (Medical): No     Lack of Transportation (Non-Medical): No   Physical Activity: Not on file   Stress: Not on file   Social Connections: Not on file   Intimate Partner Violence: Not on file   Housing Stability: Not on file      Medications and Allergies:     Current Outpatient Medications   Medication Sig Dispense Refill    fluticasone (FLONASE) 50 mcg/act nasal spray 2 sprays into each nostril daily 48 mL 1    hydrocortisone 2.5 % cream Apply topically 2 (two) times a day For 1 week only every month as needed 30 g 0    levothyroxine 75 mcg tablet Take 1 tablet (75 mcg total) by mouth daily 90 tablet 0    olmesartan (BENICAR) 5 mg tablet Take 1 tablet (5 mg total) by mouth daily 90 tablet 1     No current facility-administered medications for this visit.     No Known Allergies   Immunizations:     Immunization History   Administered Date(s) Administered    COVID-19 J&J ([employer]akash) vaccine 0.5 mL 03/14/2021, 12/30/2021    INFLUENZA 10/11/2016    Influenza Quadrivalent, 6-35 Months IM 11/24/2014, 12/22/2015, 10/11/2016, 09/19/2017    Influenza, high dose seasonal 0.7 mL 10/12/2021    Influenza, injectable, quadrivalent, preservative free 0.5 mL 12/10/2020    Influenza, recombinant, quadrivalent,injectable, preservative free 09/26/2018      Health Maintenance:         Topic Date Due    Colorectal Cancer Screening  02/23/2018    Breast Cancer Screening:  Mammogram  12/17/2022    Hepatitis C Screening  Completed         Topic Date Due    Pneumococcal Vaccine: 65+ Years (1 - PCV) Never done    Influenza Vaccine (1) 09/01/2023    COVID-19 Vaccine (3 - 2023-24 season) 09/01/2023      Medicare Screening Tests and Risk Assessments:     Leatha is here for her Initial Wellness visit.     Health Risk Assessment:   Patient rates overall health as very good. Patient feels that their physical health rating is same. Patient is satisfied with their life. Eyesight was rated as same. Hearing was rated as same. Patient feels that their emotional and mental health rating is same. Patients states they are never, rarely angry. Patient states they are sometimes unusually tired/fatigued. Pain experienced in the last 7 days has been some. Patient's pain rating has been 4/10. Patient states that she has experienced no weight loss or gain in last 6 months.     Fall Risk Screening:   In the past year, patient has experienced: no history of falling in past year      Urinary Incontinence Screening:   Patient has not leaked urine accidently in the last six months.     Home Safety:  Patient does not have trouble with stairs inside or outside of their home. Patient has working smoke alarms and has working carbon monoxide detector. Home safety hazards include: none.     Nutrition:   Current diet is Regular.     Medications:   Patient is currently taking over-the-counter supplements. OTC medications include: see medication list. Patient is able to manage medications.     Activities of Daily Living (ADLs)/Instrumental Activities of Daily Living (IADLs):   Walk and transfer into and out of bed and chair?: Yes  Dress and groom yourself?: Yes    Bathe or shower yourself?: Yes    Feed yourself? Yes  Do your laundry/housekeeping?: Yes  Manage your money, pay your bills and track your expenses?: Yes  Make your own meals?: Yes    Do your own shopping?: Yes    Previous Hospitalizations:   Any  hospitalizations or ED visits within the last 12 months?: No      Advance Care Planning:   Living will: No    Durable POA for healthcare: No    Advanced directive: No    Advanced directive counseling given: Yes      Cognitive Screening:   Provider or family/friend/caregiver concerned regarding cognition?: No    PREVENTIVE SCREENINGS      Cardiovascular Screening:    General: Screening Current      Diabetes Screening:     General: Screening Current      Colorectal Cancer Screening:     General: Screening Current      Breast Cancer Screening:       Due for: Mammogram        Cervical Cancer Screening:    General: Screening Not Indicated      Osteoporosis Screening:      Due for: DXA Axial      Abdominal Aortic Aneurysm (AAA) Screening:        General: Screening Not Indicated      Lung Cancer Screening:     General: Screening Not Indicated      Hepatitis C Screening:    General: Screening Current    Screening, Brief Intervention, and Referral to Treatment (SBIRT)    Screening  Typical number of drinks in a day: 0  Typical number of drinks in a week: 0  Interpretation: Low risk drinking behavior.    AUDIT-C Screenin) How often did you have a drink containing alcohol in the past year? never  2) How many drinks did you have on a typical day when you were drinking in the past year? 0  3) How often did you have 6 or more drinks on one occasion in the past year? never    AUDIT-C Score: 0  Interpretation: Score 0-2 (female): Negative screen for alcohol misuse    Single Item Drug Screening:  How often have you used an illegal drug (including marijuana) or a prescription medication for non-medical reasons in the past year? never    Single Item Drug Screen Score: 0  Interpretation: Negative screen for possible drug use disorder    Other Counseling Topics:   Skin self-exam, sunscreen and calcium and vitamin D intake.     No results found.     Physical Exam:     There were no vitals taken for this visit.    Physical Exam      Kurt Baptiste MD

## 2024-02-08 NOTE — PROGRESS NOTES
Assessment and Plan:       Problem List Items Addressed This Visit    None       Preventive health issues were discussed with patient, and age appropriate screening tests were ordered as noted in patient's After Visit Summary.  Personalized health advice and appropriate referrals for health education or preventive services given if needed, as noted in patient's After Visit Summary.     History of Present Illness:     Patient presents for a Medicare Wellness Visit      Patient Care Team:  Kurt Baptiste MD as PCP - General (Internal Medicine)  MD Nicola Leigh MD Harshini Dhananjay, DO Doron Rabin, MD Jose Ramon Garcia, MD Hazel Tuazon, MD     Review of Systems:     Review of Systems     Problem List:     Patient Active Problem List   Diagnosis    Depression with anxiety    Chronic bundle branch block    Hypothyroidism due to acquired atrophy of thyroid    Primary hypertension    Lipodystrophy of abdomen    Trigger middle finger of right hand    Vitamin D deficiency    Allergic rhinitis    Iron deficiency anemia    h/o Complete tear of left rotator cuff    Eczema of external ear, left    Dyslipidemia    Microscopic hematuria    Spondylosis    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, bilateral        Past Medical and Surgical History:     Past Medical History:   Diagnosis Date    Adhesive capsulitis of left shoulder     Last assessed - 2/16/15    Allergic 2023    Itching comes and go    Allergic rhinitis     Last assessed - 3/17/15    Anxiety     Back pain     Disease of thyroid gland     hypo    Hyperlipidemia     borderline , no meds    Hypertension 2022    LBBB (left bundle branch block)     chronic    Migraine     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 for tuberculosis     Psychiatric disorder     Vitamin D deficiency      Past Surgical History:   Procedure Laterality Date    BREAST CYST EXCISION Left 1990    benign    BREAST CYST EXCISION Right     benign    BREAST CYST EXCISION Left      benign    COLONOSCOPY      HYSTERECTOMY      [place] UNLISTED PX SKIN MUC MEMBRANE & SUBQ TISSUE N/A 7/25/2019    Procedure: ABDOMINOPLASTY WITH LIPOSUCTION;  Surgeon: Anselmo Patel MD;  Location:  MAIN OR;  Service: Plastics    SHOULDER ARTHROSCOPY      Last assessed - 12/23/14    SHOULDER ARTHROSCOPY W/ ROTATOR CUFF REPAIR      Last assessed - 4/4/16        Family History:     Family History   Problem Relation Age of Onset    Cancer Mother     Depression Mother     Breast cancer Mother 75    Depression Sister     Diabetes Sister     Thyroid disease Sister         Disorder    Cancer Sister 36        neck lymph nodes    Cancer Son     Kidney cancer Son     Heart attack Maternal Grandmother     Liver cancer Maternal Grandmother     Stroke Maternal Grandfather     Cancer Family     Hypertension Family         Essential     Cancer Other     Thyroid disease Daughter         Disorder    No Known Problems Paternal Grandmother     Breast cancer Sister 59    No Known Problems Sister     No Known Problems Sister     Breast cancer Maternal Aunt     No Known Problems Paternal Aunt     No Known Problems Paternal Aunt     No Known Problems Paternal Aunt         Social History:     Social History     Socioeconomic History    Marital status: /Civil Union     Spouse name: None    Number of children: 4    Years of education: HS or GED    Highest education level: None   Occupational History    Occupation: Unemployed    Tobacco Use    Smoking status: Never    Smokeless tobacco: Never   Vaping Use    Vaping status: Never Used   Substance and Sexual Activity    Alcohol use: Never    Drug use: No    Sexual activity: Not Currently   Other Topics Concern    None   Social History Narrative    Functioning activity level - participates in sedentary/light activities outside of the home and moderate activities inside of the home.    Lives with her partner and her 13y/o son.     Social Determinants of Health     Financial Resource Strain:  Low Risk  (2/4/2024)    Overall Financial Resource Strain (CARDIA)     Difficulty of Paying Living Expenses: Not very hard   Food Insecurity: Not on file   Transportation Needs: No Transportation Needs (2/4/2024)    PRAPARE - Transportation     Lack of Transportation (Medical): No     Lack of Transportation (Non-Medical): No   Physical Activity: Not on file   Stress: Not on file   Social Connections: Not on file   Intimate Partner Violence: Not on file   Housing Stability: Not on file        Medications and Allergies:     Current Outpatient Medications   Medication Sig Dispense Refill    fluticasone (FLONASE) 50 mcg/act nasal spray 2 sprays into each nostril daily 48 mL 1    hydrocortisone 2.5 % cream Apply topically 2 (two) times a day For 1 week only every month as needed 30 g 0    levothyroxine 75 mcg tablet Take 1 tablet (75 mcg total) by mouth daily 90 tablet 0    olmesartan (BENICAR) 5 mg tablet Take 1 tablet (5 mg total) by mouth daily 90 tablet 1     No current facility-administered medications for this visit.     No Known Allergies     Immunizations:     Immunization History   Administered Date(s) Administered    COVID-19 J&J ([employer]) vaccine 0.5 mL 03/14/2021, 12/30/2021    INFLUENZA 10/11/2016    Influenza Quadrivalent, 6-35 Months IM 11/24/2014, 12/22/2015, 10/11/2016, 09/19/2017    Influenza, high dose seasonal 0.7 mL 10/12/2021    Influenza, injectable, quadrivalent, preservative free 0.5 mL 12/10/2020    Influenza, recombinant, quadrivalent,injectable, preservative free 09/26/2018        Health Maintenance:         Topic Date Due    Colorectal Cancer Screening  02/23/2018    Breast Cancer Screening: Mammogram  12/17/2022    Hepatitis C Screening  Completed         Topic Date Due    Pneumococcal Vaccine: 65+ Years (1 - PCV) Never done    Influenza Vaccine (1) 09/01/2023    COVID-19 Vaccine (3 - 2023-24 season) 09/01/2023      Medicare Screening Tests and Risk Assessments:         Health Risk  Assessment:   Patient rates overall health as very good. Patient feels that their physical health rating is same. Patient is satisfied with their life. Eyesight was rated as same. Hearing was rated as same. Patient feels that their emotional and mental health rating is same. Patients states they are never, rarely angry. Patient states they are sometimes unusually tired/fatigued. Pain experienced in the last 7 days has been some. Patient's pain rating has been 4/10. Patient states that she has experienced no weight loss or gain in last 6 months.     Fall Risk Screening:   In the past year, patient has experienced: no history of falling in past year      Urinary Incontinence Screening:   Patient has not leaked urine accidently in the last six months.     Home Safety:  Patient does not have trouble with stairs inside or outside of their home. Patient has working smoke alarms and has working carbon monoxide detector. Home safety hazards include: none.     Nutrition:   Current diet is Regular.     Medications:   Patient is currently taking over-the-counter supplements. OTC medications include: see medication list. Patient is able to manage medications.     Activities of Daily Living (ADLs)/Instrumental Activities of Daily Living (IADLs):   Walk and transfer into and out of bed and chair?: Yes  Dress and groom yourself?: Yes    Bathe or shower yourself?: Yes    Feed yourself? Yes  Do your laundry/housekeeping?: Yes  Manage your money, pay your bills and track your expenses?: Yes  Make your own meals?: Yes    Do your own shopping?: Yes    Previous Hospitalizations:   Any hospitalizations or ED visits within the last 12 months?: No      Advance Care Planning:   Living will: No    Durable POA for healthcare: No    Advanced directive: No      PREVENTIVE SCREENINGS      Cardiovascular Screening:    General: Screening Current      Diabetes Screening:     General: Screening Current      Colorectal Cancer Screening:     General:  Screening Current      Cervical Cancer Screening:    General: Screening Not Indicated      Lung Cancer Screening:     General: Screening Not Indicated      Hepatitis C Screening:    General: Screening Current    Screening, Brief Intervention, and Referral to Treatment (SBIRT)    Screening  Typical number of drinks in a day: 0  Typical number of drinks in a week: 0  Interpretation: Low risk drinking behavior.    AUDIT-C Screenin) How often did you have a drink containing alcohol in the past year? never  2) How many drinks did you have on a typical day when you were drinking in the past year? 0  3) How often did you have 6 or more drinks on one occasion in the past year? never    AUDIT-C Score: 0  Interpretation: Score 0-2 (female): Negative screen for alcohol misuse    Single Item Drug Screening:  How often have you used an illegal drug (including marijuana) or a prescription medication for non-medical reasons in the past year? never    Single Item Drug Screen Score: 0  Interpretation: Negative screen for possible drug use disorder    No results found.     Physical Exam:     There were no vitals taken for this visit.      Physical Exam

## 2024-04-11 ENCOUNTER — OFFICE VISIT (OUTPATIENT)
Dept: DENTISTRY | Facility: CLINIC | Age: 69
End: 2024-04-11

## 2024-04-11 VITALS — HEART RATE: 73 BPM | DIASTOLIC BLOOD PRESSURE: 80 MMHG | SYSTOLIC BLOOD PRESSURE: 125 MMHG | TEMPERATURE: 97.5 F

## 2024-04-11 DIAGNOSIS — Z01.20 ENCOUNTER FOR DENTAL EXAMINATION: Primary | ICD-10-CM

## 2024-04-12 NOTE — DENTAL PROCEDURE DETAILS
Comprehensive Exam    Leatha Wells 69 y.o. female presents with self to [facility] for comprehensive exam. Pt was last seen here in November 2021, so pt was examined as if she were a new pt in the clinic. Pt states she moved down to [place] in between her most recent visit and today, and states she was being seen by a dentist regularly during that time.  PMH reviewed, no changes, ASA II. Significant medical history: hypothyroidism. Significant allergies: none known. Significant medications: levothyroxine.  Pain level 0/10    Chief complaint:   Needs a cleaning.    Consent:  Reviewed procedures involved with comprehensive exam including radiographs, oral exam, and periodontal probing.   Patient understands and consents.    Radiographs: 4 BWs  Upon clinical evaluation, radiolucencies on #21 and #22 determined to be old composite material, not caries.  Since most recent visit, pt had bridge #11-13, RCT #13, and ext #31 done down in [place].    Periodontal exam:  Hygiene - Fair  Plaque - Moderate  Horizontal bone loss  UR - Moderate (15-33%)  UL - Moderate (15-33%)  LL - Moderate (15-33%)  LR - Moderate (15-33%)  Vertical bone loss - #3  Subgingival calculus - Localized  BOP - Localized  Mobility - None  Furcation involvements - None  Occlusal trauma - None  Smoker - No  Diabetic - No  Periodontal Stage: II  Periodontal Grade: B  Periodontal Plan: perio maintenance - pt states she had SRP roughly a year ago in [place].    Caries exam:   None  Teeth with high change of needing RCT? None    Oral cancer screening: No abnormalities detected  Soft tissues exam: Within normal limits  Hard tissues exam: Within normal limits    Tx plan:  Tx plan able to be determined at comp exam.  Recommended recall schedule: 6 months.  Because pt claims hx of SRP and only has light calculus, pt recommended to have perio maintenance.  Pt was asked if she was had any desire to replace #31. Pt states she was hoping to consider it, but was not  sure if she wanted to go the implant route. Pt was informed it would be difficult to try to use #32 as a bridge abutment, and it would be very burdensome to have a removable partial for only one tooth. The tooth replacement is not an emergency, therefore pt can take her time to think about what she wants.    NV: perio maintenance.  NNV: recall, check interest in replacing #31.    Attending: Dr. Manning was present in clinic.

## 2024-04-25 ENCOUNTER — RA CDI HCC (OUTPATIENT)
Dept: OTHER | Facility: HOSPITAL | Age: 69
End: 2024-04-25

## 2024-05-04 ENCOUNTER — TELEPHONE (OUTPATIENT)
Dept: OTHER | Facility: OTHER | Age: 69
End: 2024-05-04

## 2024-05-04 NOTE — TELEPHONE ENCOUNTER
Patient is calling regarding cancelling an appointment.    Date/Time:5/18/24 8:00 am     Patient was rescheduled: YES [] NO [x]    Patient requesting call back to reschedule: YES [x] NO []Patient is calling regarding cancelling an appointment.    Date/Time:5/17/24 8:30    Patient was rescheduled: YES [x] NO []    Patient requesting call back to reschedule: YES [] NO [x]

## 2024-05-06 ENCOUNTER — TELEPHONE (OUTPATIENT)
Age: 69
End: 2024-05-06

## 2024-05-06 ENCOUNTER — PREP FOR PROCEDURE (OUTPATIENT)
Age: 69
End: 2024-05-06

## 2024-05-06 DIAGNOSIS — Z12.11 SCREENING FOR COLON CANCER: Primary | ICD-10-CM

## 2024-05-06 NOTE — TELEPHONE ENCOUNTER
05/06/24  Screened by: Cristiana Craven    Referring Provider     Pre- Screening:     There is no height or weight on file to calculate BMI.  Has patient been referred for a routine screening Colonoscopy? yes  Is the patient between 45-75 years old? yes      Previous Colonoscopy yes   If yes:    Date: 12 years ago    Facility:     Reason:       Does the patient want to see a Gastroenterologist prior to their procedure OR are they having any GI symptoms? no    Has the patient been hospitalized or had abdominal surgery in the past 6 months? no    Does the patient use supplemental oxygen? no    Does the patient take Coumadin, Lovenox, Plavix, Elliquis, Xarelto, or other blood thinning medication? no    Has the patient had a stroke, cardiac event, or stent placed in the past year? no      If patient is between 45yrs - 49yrs, please advise patient that we will have to confirm benefits & coverage with their insurance company for a routine screening colonoscopy.

## 2024-05-06 NOTE — TELEPHONE ENCOUNTER
Scheduled date of colonoscopy (as of today): 6/27/24  Physician performing colonoscopy: Dr. Jaiyeola  Location of colonoscopy: [facility]  Bowel prep reviewed with patient: Randy Adrian/Kierra  Instructions reviewed with patient by: Cristiana WILLINGHAM, sent via [employer], pt aware  Clearances: n/a

## 2024-05-07 ENCOUNTER — TELEPHONE (OUTPATIENT)
Dept: SURGERY | Facility: CLINIC | Age: 69
End: 2024-05-07

## 2024-05-07 NOTE — TELEPHONE ENCOUNTER
Note to chart:  Pt had appt w/Gen Surg for a Colonoscopy Consult, but she didn't come. Spoke with the pt and she said she was at work but would call back today.     I checked her chart and then her appt desk and she is already scheduled to have a colonoscopy through GI on 6/27/24. Closing this out.

## 2024-05-24 ENCOUNTER — HOSPITAL ENCOUNTER (OUTPATIENT)
Dept: MAMMOGRAPHY | Facility: CLINIC | Age: 69
End: 2024-05-24
Payer: COMMERCIAL

## 2024-05-24 VITALS — BODY MASS INDEX: 27.42 KG/M2 | WEIGHT: 136 LBS | HEIGHT: 59 IN

## 2024-05-24 DIAGNOSIS — Z12.31 ENCOUNTER FOR SCREENING MAMMOGRAM FOR BREAST CANCER: ICD-10-CM

## 2024-05-24 PROCEDURE — 77067 SCR MAMMO BI INCL CAD: CPT

## 2024-05-24 PROCEDURE — 77063 BREAST TOMOSYNTHESIS BI: CPT

## 2024-06-10 ENCOUNTER — TELEPHONE (OUTPATIENT)
Age: 69
End: 2024-06-10

## 2024-06-10 DIAGNOSIS — M25.511 BILATERAL SHOULDER PAIN, UNSPECIFIED CHRONICITY: Primary | ICD-10-CM

## 2024-06-10 DIAGNOSIS — M25.512 BILATERAL SHOULDER PAIN, UNSPECIFIED CHRONICITY: Primary | ICD-10-CM

## 2024-06-10 RX ORDER — IBUPROFEN 800 MG/1
800 TABLET ORAL EVERY 6 HOURS PRN
Qty: 30 TABLET | Refills: 0 | Status: SHIPPED | OUTPATIENT
Start: 2024-06-10 | End: 2024-07-10

## 2024-06-10 NOTE — TELEPHONE ENCOUNTER
Pt called and rescheduled appt because she wants to get the imaging studies done first. She did say for the last appt that Dr. Baptiste asked if she wanted him to put a prescription for ibuprofen 800 mgs and at the time she said no, but she is having pain on her shoulders again and said that if Dr. Baptiste can put a prescription to her [id number] - [place], [place] - [address] [[id number]] . Please advise [phone] thank you.

## 2024-06-11 ENCOUNTER — TELEPHONE (OUTPATIENT)
Dept: GASTROENTEROLOGY | Facility: MEDICAL CENTER | Age: 69
End: 2024-06-11

## 2024-06-12 ENCOUNTER — TELEPHONE (OUTPATIENT)
Dept: GASTROENTEROLOGY | Facility: MEDICAL CENTER | Age: 69
End: 2024-06-12

## 2024-06-12 NOTE — TELEPHONE ENCOUNTER
Called patient to confirm procedure on 06/27/2024 with Dr.Jaiyeola at [place], Sent prep instructions via patient email

## 2024-06-26 ENCOUNTER — RA CDI HCC (OUTPATIENT)
Dept: OTHER | Facility: HOSPITAL | Age: 69
End: 2024-06-26

## 2024-06-27 ENCOUNTER — HOSPITAL ENCOUNTER (OUTPATIENT)
Dept: GASTROENTEROLOGY | Facility: HOSPITAL | Age: 69
Setting detail: OUTPATIENT SURGERY
End: 2024-06-27
Attending: INTERNAL MEDICINE
Payer: COMMERCIAL

## 2024-06-27 ENCOUNTER — ANESTHESIA EVENT (OUTPATIENT)
Dept: GASTROENTEROLOGY | Facility: HOSPITAL | Age: 69
End: 2024-06-27

## 2024-06-27 ENCOUNTER — HOSPITAL ENCOUNTER (OUTPATIENT)
Dept: CT IMAGING | Facility: HOSPITAL | Age: 69
Discharge: HOME | End: 2024-06-27
Payer: COMMERCIAL

## 2024-06-27 ENCOUNTER — ANESTHESIA (OUTPATIENT)
Dept: GASTROENTEROLOGY | Facility: HOSPITAL | Age: 69
End: 2024-06-27

## 2024-06-27 VITALS
HEART RATE: 64 BPM | OXYGEN SATURATION: 100 % | SYSTOLIC BLOOD PRESSURE: 122 MMHG | RESPIRATION RATE: 12 BRPM | TEMPERATURE: 97.6 F | DIASTOLIC BLOOD PRESSURE: 66 MMHG

## 2024-06-27 DIAGNOSIS — Z12.11 SCREENING FOR COLON CANCER: ICD-10-CM

## 2024-06-27 DIAGNOSIS — Z12.11 SCREENING FOR COLON CANCER: Primary | ICD-10-CM

## 2024-06-27 PROCEDURE — G0121 COLON CA SCRN NOT HI RSK IND: HCPCS | Performed by: INTERNAL MEDICINE

## 2024-06-27 PROCEDURE — 74261 CT COLONOGRAPHY DX: CPT

## 2024-06-27 RX ORDER — PROPOFOL 10 MG/ML
INJECTION, EMULSION INTRAVENOUS CONTINUOUS PRN
Status: DISCONTINUED | OUTPATIENT
Start: 2024-06-27 | End: 2024-06-27

## 2024-06-27 RX ORDER — SODIUM CHLORIDE, SODIUM LACTATE, POTASSIUM CHLORIDE, CALCIUM CHLORIDE 600; 310; 30; 20 MG/100ML; MG/100ML; MG/100ML; MG/100ML
50 INJECTION, SOLUTION INTRAVENOUS CONTINUOUS
Status: DISCONTINUED | OUTPATIENT
Start: 2024-06-27 | End: 2024-07-01 | Stop reason: HOSPADM

## 2024-06-27 RX ORDER — SODIUM CHLORIDE, SODIUM LACTATE, POTASSIUM CHLORIDE, CALCIUM CHLORIDE 600; 310; 30; 20 MG/100ML; MG/100ML; MG/100ML; MG/100ML
50 INJECTION, SOLUTION INTRAVENOUS CONTINUOUS
Status: CANCELLED | OUTPATIENT
Start: 2024-06-27

## 2024-06-27 RX ORDER — ONDANSETRON 2 MG/ML
4 INJECTION INTRAMUSCULAR; INTRAVENOUS ONCE AS NEEDED
Status: CANCELLED | OUTPATIENT
Start: 2024-06-27

## 2024-06-27 RX ORDER — SODIUM CHLORIDE, SODIUM LACTATE, POTASSIUM CHLORIDE, CALCIUM CHLORIDE 600; 310; 30; 20 MG/100ML; MG/100ML; MG/100ML; MG/100ML
INJECTION, SOLUTION INTRAVENOUS CONTINUOUS PRN
Status: DISCONTINUED | OUTPATIENT
Start: 2024-06-27 | End: 2024-06-27

## 2024-06-27 RX ORDER — SODIUM CHLORIDE, SODIUM LACTATE, POTASSIUM CHLORIDE, CALCIUM CHLORIDE 600; 310; 30; 20 MG/100ML; MG/100ML; MG/100ML; MG/100ML
20 INJECTION, SOLUTION INTRAVENOUS CONTINUOUS
Status: CANCELLED | OUTPATIENT
Start: 2024-06-27

## 2024-06-27 RX ORDER — PROPOFOL 10 MG/ML
INJECTION, EMULSION INTRAVENOUS AS NEEDED
Status: DISCONTINUED | OUTPATIENT
Start: 2024-06-27 | End: 2024-06-27

## 2024-06-27 RX ADMIN — PROPOFOL 50 MG: 10 INJECTION, EMULSION INTRAVENOUS at 09:01

## 2024-06-27 RX ADMIN — SODIUM CHLORIDE, SODIUM LACTATE, POTASSIUM CHLORIDE, AND CALCIUM CHLORIDE: .6; .31; .03; .02 INJECTION, SOLUTION INTRAVENOUS at 08:53

## 2024-06-27 RX ADMIN — PROPOFOL 50 MG: 10 INJECTION, EMULSION INTRAVENOUS at 09:17

## 2024-06-27 RX ADMIN — Medication 40 MG: at 09:52

## 2024-06-27 RX ADMIN — PROPOFOL 100 MCG/KG/MIN: 10 INJECTION, EMULSION INTRAVENOUS at 09:02

## 2024-06-27 RX ADMIN — PROPOFOL 50 MG: 10 INJECTION, EMULSION INTRAVENOUS at 09:02

## 2024-06-27 RX ADMIN — PROPOFOL 50 MG: 10 INJECTION, EMULSION INTRAVENOUS at 09:10

## 2024-06-27 RX ADMIN — SODIUM CHLORIDE, SODIUM LACTATE, POTASSIUM CHLORIDE, AND CALCIUM CHLORIDE 50 ML/HR: .6; .31; .03; .02 INJECTION, SOLUTION INTRAVENOUS at 08:50

## 2024-06-27 NOTE — H&P
H&P EXAM - Outpatient Endoscopy   Leatha Wells 69 y.o. female MRN: [id number]    [facility]   Encounter: [id number]        History and Physical -  [facility]  Leatha Wells 69 y.o. female MRN: [id number]                  HPI: Leatha Wells is a 69 y.o. year old female who presents for colon cancer screening      REVIEW OF SYSTEMS: Per the HPI, and otherwise unremarkable.    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 for tuberculosis     Psychiatric disorder     Vitamin D deficiency      Past Surgical History:   Procedure Laterality Date    BREAST CYST EXCISION Left 1990    benign    BREAST CYST EXCISION Right     benign    BREAST CYST EXCISION Left     benign    COLONOSCOPY      HYSTERECTOMY      SD UNLISTED PX SKIN MUC MEMBRANE & SUBQ TISSUE N/A 7/25/2019    Procedure: ABDOMINOPLASTY WITH LIPOSUCTION;  Surgeon: Anselmo Patel MD;  Location:  MAIN OR;  Service: Plastics    SHOULDER ARTHROSCOPY      Last assessed - 12/23/14    SHOULDER ARTHROSCOPY W/ ROTATOR CUFF REPAIR      Last assessed - 4/4/16     Social History   Social History     Substance and Sexual Activity   Alcohol Use Never     Social History     Substance and Sexual Activity   Drug Use No     Social History     Tobacco Use   Smoking Status Never   Smokeless Tobacco Never     Family History   Problem Relation Age of Onset    Cancer Mother     Depression Mother     Breast cancer Mother 75    Depression Sister     Diabetes Sister     Thyroid disease Sister         Disorder    Cancer Sister 36        neck  lymph nodes    Breast cancer Sister 59    No Known Problems Sister     No Known Problems Sister     Thyroid disease Daughter         Disorder    Heart attack Maternal Grandmother     Liver cancer Maternal Grandmother     Stroke Maternal Grandfather     No Known Problems Paternal Grandmother     Cancer Son     Kidney cancer Son     Breast cancer Maternal Aunt 70    No Known Problems Paternal Aunt     No Known Problems Paternal Aunt     No Known Problems Paternal Aunt     Cancer Other     Cancer Family     Hypertension Family         Essential        Meds/Allergies     Not in a hospital admission.    No Known Allergies    Objective     /64   Pulse 70   Temp 97.6 °F (36.4 °C) (Temporal)   Resp 16   SpO2 100%       PHYSICAL EXAM    Gen: NAD  CV: RRR  CHEST: Clear  ABD: soft, NT/ND  EXT: no edema      ASSESSMENT/PLAN:  This is a 69 y.o. year old female here for colonoscopy, and she is stable and optimized for her procedure.

## 2024-06-27 NOTE — ANESTHESIA POSTPROCEDURE EVALUATION
Post-Op Assessment Note    CV Status:  Stable  Pain Score: 0    Pain management: adequate       Mental Status:  Sleepy   Hydration Status:  Stable   PONV Controlled:  None   Airway Patency:  Patent  There is a medical reason for not screening for obstructive sleep apnea and/or for not using two or more mitigation strategies   Post Op Vitals Reviewed: Yes    No anethesia notable event occurred.    Staff: CRNA               BP   90/50   Temp      Pulse  70   Resp   14   SpO2   99

## 2024-06-27 NOTE — ANESTHESIA PREPROCEDURE EVALUATION
Procedure:  COLONOSCOPY    Relevant Problems   CARDIO   (+) Chronic bundle branch block   (+) Primary hypertension      ENDO   (+) Hypothyroidism due to acquired atrophy of thyroid      MUSCULOSKELETAL   (+) Spondylosis      NEURO/PSYCH   (+) Depression with anxiety      Rheumatology   (+) Lipodystrophy of abdomen      Other   (+) Dyslipidemia        Physical Exam    Airway    Mallampati score: II  TM Distance: >3 FB  Neck ROM: full     Dental       Cardiovascular  Cardiovascular exam normal    Pulmonary  Pulmonary exam normal     Other Findings  post-pubertal.      Anesthesia Plan  ASA Score- 2     Anesthesia Type- IV sedation with anesthesia with ASA Monitors.         Additional Monitors:     Airway Plan:            Plan Factors-Exercise tolerance (METS): >4 METS.    Chart reviewed. EKG reviewed.  Existing labs reviewed. Patient summary reviewed.    Patient is not a current smoker. Patient not instructed to abstain from smoking on day of procedure. Patient did not smoke on day of surgery.            Induction-     Postoperative Plan-     Perioperative Resuscitation Plan - Level 1 - Full Code.       Informed Consent- Anesthetic plan and risks discussed with patient and spouse.  I personally reviewed this patient with the CRNA. Discussed and agreed on the Anesthesia Plan with the CRNA..        Lab Results   Component Value Date    HGBA1C 6.4 (H) 02/03/2024       Lab Results   Component Value Date     09/23/2015    K 4.7 02/03/2024     02/03/2024    CO2 30 02/03/2024    ANIONGAP 4 09/23/2015    BUN 26 (H) 02/03/2024    CREATININE 0.94 02/03/2024    GLUCOSE 91 09/23/2015    GLUF 93 02/03/2024    CALCIUM 9.7 02/03/2024    CORRECTEDCA 9.1 01/02/2021    AST 26 02/03/2024    ALT 39 02/03/2024    ALKPHOS 95 02/03/2024    PROT 7.1 09/23/2015    BILITOT 0.57 09/23/2015    EGFR 62 02/03/2024       Lab Results   Component Value Date    WBC 8.12 02/03/2024    HGB 12.1 02/03/2024    HCT 39.7 02/03/2024    MCV 83  02/03/2024     02/03/2024     Normal sinus rhythm  Left bundle branch block  Abnormal ECG  When compared with ECG of 29-DEC-2020 18:20,  No significant change was found  Confirmed by Rafita David ([id number]) on 10/28/2021 4:18:50 PM      Specimen Collected: 10/28/21 12:15

## 2024-06-27 NOTE — PERIOPERATIVE NURSING NOTE
Pt needs a CT colonography, pt will be heading to [facility]. Pt aware of being NPO until done with CT scan.

## 2024-06-28 ENCOUNTER — HOSPITAL ENCOUNTER (OUTPATIENT)
Dept: BONE DENSITY | Facility: CLINIC | Age: 69
End: 2024-06-28
Payer: COMMERCIAL

## 2024-06-28 DIAGNOSIS — Z78.0 POSTMENOPAUSAL: ICD-10-CM

## 2024-06-28 PROCEDURE — 77080 DXA BONE DENSITY AXIAL: CPT

## 2024-07-03 ENCOUNTER — APPOINTMENT (OUTPATIENT)
Dept: LAB | Facility: HOSPITAL | Age: 69
End: 2024-07-03
Payer: COMMERCIAL

## 2024-07-03 ENCOUNTER — OFFICE VISIT (OUTPATIENT)
Dept: INTERNAL MEDICINE CLINIC | Facility: CLINIC | Age: 69
End: 2024-07-03
Payer: COMMERCIAL

## 2024-07-03 VITALS
SYSTOLIC BLOOD PRESSURE: 138 MMHG | DIASTOLIC BLOOD PRESSURE: 69 MMHG | TEMPERATURE: 98.4 F | HEART RATE: 88 BPM | WEIGHT: 134 LBS | HEIGHT: 59 IN | BODY MASS INDEX: 27.01 KG/M2

## 2024-07-03 DIAGNOSIS — E78.5 DYSLIPIDEMIA: ICD-10-CM

## 2024-07-03 DIAGNOSIS — I10 PRIMARY HYPERTENSION: ICD-10-CM

## 2024-07-03 DIAGNOSIS — E03.4 HYPOTHYROIDISM DUE TO ACQUIRED ATROPHY OF THYROID: ICD-10-CM

## 2024-07-03 DIAGNOSIS — E03.4 HYPOTHYROIDISM DUE TO ACQUIRED ATROPHY OF THYROID: Primary | ICD-10-CM

## 2024-07-03 DIAGNOSIS — L81.4 SOLAR LENTIGO: ICD-10-CM

## 2024-07-03 DIAGNOSIS — R73.01 IMPAIRED FASTING GLUCOSE: ICD-10-CM

## 2024-07-03 LAB — TSH SERPL DL<=0.05 MIU/L-ACNC: 1.53 UIU/ML (ref 0.45–4.5)

## 2024-07-03 PROCEDURE — 84443 ASSAY THYROID STIM HORMONE: CPT

## 2024-07-03 PROCEDURE — G2211 COMPLEX E/M VISIT ADD ON: HCPCS | Performed by: STUDENT IN AN ORGANIZED HEALTH CARE EDUCATION/TRAINING PROGRAM

## 2024-07-03 PROCEDURE — 99214 OFFICE O/P EST MOD 30 MIN: CPT | Performed by: STUDENT IN AN ORGANIZED HEALTH CARE EDUCATION/TRAINING PROGRAM

## 2024-07-03 PROCEDURE — 36415 COLL VENOUS BLD VENIPUNCTURE: CPT

## 2024-07-03 RX ORDER — ROSUVASTATIN CALCIUM 20 MG/1
20 TABLET, COATED ORAL DAILY
Qty: 90 TABLET | Refills: 1 | Status: SHIPPED | OUTPATIENT
Start: 2024-07-03

## 2024-07-03 NOTE — RESULT ENCOUNTER NOTE
Please call the patient with the results    Please let her know that her CT scan did not show any evidence of polyps.

## 2024-07-03 NOTE — PROGRESS NOTES
"INTERNAL MEDICINE OFFICE VISIT NOTE  [facility] Internal Medicine [place]    NAME: Leatha Wells  AGE: 69 y.o. SEX: female    DATE OF ENCOUNTER:       Chief Complaint   Patient presents with    Follow-up     3 month follow up   Review Labs         Review of Systems  10 point ROS negative except per HPI    OBJECTIVE:  Vitals:    07/03/24 1402   BP: 148/69   BP Location: Left arm   Patient Position: Sitting   Cuff Size: Standard   Pulse: 88   Temp: 98.4 °F (36.9 °C)   Weight: 60.8 kg (134 lb)   Height: 4' 11\" (1.499 m)       Physical Exam:   GENERAL: NAD, Normal appearance.    NEUROLOGIC:  Alert/oriented x3.  HEENT:  NC/AT, no scleral icterus  CARDIAC:  RRR, +S1/S2  PULMONARY:  non-labored breathing    ASSESSMENT/PLAN:    1. Hypothyroidism due to acquired atrophy of thyroid  Assessment & Plan:  Lab Results   Component Value Date    SQY2CVOKSKSO 1.527 07/03/2024    [id number] 0.123 (L) 02/03/2024    FREET4 1.14 (H) 02/03/2024    FREET4 1.26 02/08/2021      Now controlled on levothyroxine 75mcg  six times per week - continue   Orders:  -     TSH, 3rd generation with Free T4 reflex; Future  -     TSH, 3rd generation with Free T4 reflex; Future; Expected date: 10/03/2024  2. Primary hypertension  Assessment & Plan:  Blood pressure today slightly elevated above goal of 130/80  Previous readings within acceptable parameters  She is currently on olmesartan 5 mg daily  Follow-up during her next visit and if persistently elevated will uptitrate her regiment  3. Solar lentigo  Assessment & Plan:  Reports good response to tretinoin - continue   Orders:  -     tretinoin (RETIN-A) 0.025 % cream; Apply topically daily at bedtime  4. Dyslipidemia  -     rosuvastatin (CRESTOR) 20 MG tablet; Take 1 tablet (20 mg total) by mouth daily  5. Impaired fasting glucose  Assessment & Plan:      Orders:  -     Hemoglobin A1C; Future; Expected date: 10/03/2024      No follow-ups on file.      Current Outpatient Medications:     " fluticasone (FLONASE) 50 mcg/act nasal spray, 2 sprays into each nostril daily, Disp: 48 mL, Rfl: 1    hydrocortisone 2.5 % cream, Apply topically 2 (two) times a day, Disp: 30 g, Rfl: 0    ibuprofen (MOTRIN) 800 mg tablet, Take 1 tablet (800 mg total) by mouth every 6 (six) hours as needed for mild pain or moderate pain, Disp: 30 tablet, Rfl: 0    levothyroxine 75 mcg tablet, Take 1 tablet once a day 6 days a week -, Disp: , Rfl:     olmesartan (BENICAR) 5 mg tablet, Take 1 tablet (5 mg total) by mouth daily, Disp: 90 tablet, Rfl: 1    rosuvastatin (CRESTOR) 20 MG tablet, Take 20 mg by mouth daily, Disp: , Rfl:     tretinoin (RETIN-A) 0.025 % cream, Apply topically daily at bedtime, Disp: 20 g, Rfl: 1    Patient Active Problem List   Diagnosis    Depression with anxiety    Chronic bundle branch block    Hypothyroidism due to acquired atrophy of thyroid    Primary hypertension    Lipodystrophy of abdomen    Vitamin D deficiency    Allergic rhinitis    h/o Complete tear of left rotator cuff    Eczema    Dyslipidemia    Microscopic hematuria    Spondylosis    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, bilateral    Solar lentigo    Impaired fasting glucose             Kurt Baptiste MD  [facility] Internal Medicine [place]    * Please Note: This note was completed in part utilizing a dictation software.  Grammatical errors, random word insertions, spelling mistakes, and incomplete sentences may be an occasional consequence of this system secondary to software limitations, ambient noise, and hardware issues.  If you have any questions or concerns about the content, text, or information contained within the body of this dictation, please contact the provider for clarification.**

## 2024-07-04 NOTE — ASSESSMENT & PLAN NOTE
Blood pressure today slightly elevated above goal of 130/80  Previous readings within acceptable parameters  She is currently on olmesartan 5 mg daily  Follow-up during her next visit and if persistently elevated will uptitrate her regiment

## 2024-07-04 NOTE — ASSESSMENT & PLAN NOTE
Lab Results   Component Value Date    TRK1LGKOPDSP 1.527 07/03/2024    CXJ8BRLSHLZI 0.123 (L) 02/03/2024    FREET4 1.14 (H) 02/03/2024    FREET4 1.26 02/08/2021      Now controlled on levothyroxine 75mcg  six times per week - continue

## 2024-07-05 ENCOUNTER — TELEPHONE (OUTPATIENT)
Dept: GASTROENTEROLOGY | Facility: MEDICAL CENTER | Age: 69
End: 2024-07-05

## 2024-07-05 NOTE — TELEPHONE ENCOUNTER
----- Message from Diana Jaiyeola, MD sent at 7/3/2024  7:53 AM EDT -----  Please call the patient with the results    Please let her know that her CT scan did not show any evidence of polyps.

## 2024-07-09 ENCOUNTER — TELEPHONE (OUTPATIENT)
Age: 69
End: 2024-07-09

## 2024-07-09 NOTE — TELEPHONE ENCOUNTER
PA for RETIN-A    Submitted via    []CMM-KEY   [x][employer]-Case ID # [id number]   []Faxed to plan   []Other website   []Phone call Case ID #     Office notes sent, clinical questions answered. Awaiting determination    Turnaround time for your insurance to make a decision on your Prior Authorization can take 7-21 business days.

## 2024-07-10 NOTE — TELEPHONE ENCOUNTER
PA for RETIN-A CREAM Approved     Date(s) approved January 1, 2024 to December 31, 2024     Case #    Patient advised by          [x] Teamiehart Message  [] Phone call   []LMOM  []L/M to call office as no active Communication consent on file  []Unable to leave detailed message as VM not approved on Communication consent       Pharmacy advised by    [x]Fax  []Phone call    Approval letter scanned into Media Yes

## 2024-07-22 DIAGNOSIS — E03.4 HYPOTHYROIDISM DUE TO ACQUIRED ATROPHY OF THYROID: ICD-10-CM

## 2024-07-22 RX ORDER — LEVOTHYROXINE SODIUM 0.07 MG/1
TABLET ORAL
Qty: 100 TABLET | Refills: 1 | Status: SHIPPED | OUTPATIENT
Start: 2024-07-22

## 2024-07-22 NOTE — TELEPHONE ENCOUNTER
Reason for call:   [x] Refill   [] Prior Auth  [] Other:     Office:   [x] PCP/Provider - Dr. Emile MD  [] Specialty/Provider -     Medication: levothyroxine 75 mcg     Dose/Frequency: Take 1 tablet once a day 6 days a week    Quantity: 100    Pharmacy: [facility]/pharmacy #[id number]     Does the patient have enough for 3 days?   [x] Yes   [] No - Send as HP to POD

## 2024-08-23 DIAGNOSIS — I10 PRIMARY HYPERTENSION: ICD-10-CM

## 2024-08-23 RX ORDER — OLMESARTAN MEDOXOMIL 5 MG/1
5 TABLET ORAL DAILY
Qty: 90 TABLET | Refills: 1 | Status: SHIPPED | OUTPATIENT
Start: 2024-08-23 | End: 2025-02-19

## 2024-08-27 ENCOUNTER — TELEPHONE (OUTPATIENT)
Dept: INTERNAL MEDICINE CLINIC | Facility: CLINIC | Age: 69
End: 2024-08-27

## 2024-10-04 DIAGNOSIS — E78.5 DYSLIPIDEMIA: ICD-10-CM

## 2024-10-04 RX ORDER — ROSUVASTATIN CALCIUM 20 MG/1
20 TABLET, COATED ORAL DAILY
Qty: 90 TABLET | Refills: 1 | Status: SHIPPED | OUTPATIENT
Start: 2024-10-04

## 2024-10-07 DIAGNOSIS — M25.511 BILATERAL SHOULDER PAIN, UNSPECIFIED CHRONICITY: ICD-10-CM

## 2024-10-07 DIAGNOSIS — M25.512 BILATERAL SHOULDER PAIN, UNSPECIFIED CHRONICITY: ICD-10-CM

## 2024-10-07 NOTE — TELEPHONE ENCOUNTER
Reason for call:   [x] Refill   [] Prior Auth  [] Other:     Office:   [x] PCP/Provider - [facility] [place] - Kurt Baptiste MD   [] Specialty/Provider -     Medication:  ibuprofen (MOTRIN) 800 mg tablet     Dose/Frequency: Summary: Take 1 tablet (800 mg total) by mouth every 6 (six) hours as needed for mild pain or moderate pain    Quantity: 30 tablet     Pharmacy: [facility]/pharmacy #[id number] - [place], [place] - [address] [phone]    Does the patient have enough for 3 days?   [] Yes   [x] No - Send as HP to POD

## 2024-10-08 RX ORDER — IBUPROFEN 800 MG/1
800 TABLET, FILM COATED ORAL EVERY 6 HOURS PRN
Qty: 30 TABLET | Refills: 0 | Status: SHIPPED | OUTPATIENT
Start: 2024-10-08 | End: 2024-11-07

## 2024-11-04 ENCOUNTER — RA CDI HCC (OUTPATIENT)
Dept: OTHER | Facility: HOSPITAL | Age: 69
End: 2024-11-04

## 2024-11-12 ENCOUNTER — OFFICE VISIT (OUTPATIENT)
Dept: INTERNAL MEDICINE CLINIC | Facility: CLINIC | Age: 69
End: 2024-11-12
Payer: COMMERCIAL

## 2024-11-12 VITALS
BODY MASS INDEX: 27.62 KG/M2 | SYSTOLIC BLOOD PRESSURE: 138 MMHG | HEART RATE: 83 BPM | DIASTOLIC BLOOD PRESSURE: 75 MMHG | WEIGHT: 137 LBS | TEMPERATURE: 96.9 F | HEIGHT: 59 IN

## 2024-11-12 DIAGNOSIS — I10 PRIMARY HYPERTENSION: Primary | ICD-10-CM

## 2024-11-12 DIAGNOSIS — L30.9 ECZEMA, UNSPECIFIED TYPE: ICD-10-CM

## 2024-11-12 DIAGNOSIS — R73.03 PRE-DIABETES: ICD-10-CM

## 2024-11-12 DIAGNOSIS — Z23 ENCOUNTER FOR IMMUNIZATION: ICD-10-CM

## 2024-11-12 DIAGNOSIS — R73.01 IMPAIRED FASTING GLUCOSE: ICD-10-CM

## 2024-11-12 DIAGNOSIS — E03.4 HYPOTHYROIDISM DUE TO ACQUIRED ATROPHY OF THYROID: ICD-10-CM

## 2024-11-12 DIAGNOSIS — E78.5 DYSLIPIDEMIA: ICD-10-CM

## 2024-11-12 LAB — SL AMB POCT HEMOGLOBIN AIC: 5.9 (ref ?–6.5)

## 2024-11-12 PROCEDURE — 83036 HEMOGLOBIN GLYCOSYLATED A1C: CPT | Performed by: STUDENT IN AN ORGANIZED HEALTH CARE EDUCATION/TRAINING PROGRAM

## 2024-11-12 PROCEDURE — 90662 IIV NO PRSV INCREASED AG IM: CPT | Performed by: STUDENT IN AN ORGANIZED HEALTH CARE EDUCATION/TRAINING PROGRAM

## 2024-11-12 PROCEDURE — 99214 OFFICE O/P EST MOD 30 MIN: CPT | Performed by: STUDENT IN AN ORGANIZED HEALTH CARE EDUCATION/TRAINING PROGRAM

## 2024-11-12 PROCEDURE — G0008 ADMIN INFLUENZA VIRUS VAC: HCPCS | Performed by: STUDENT IN AN ORGANIZED HEALTH CARE EDUCATION/TRAINING PROGRAM

## 2024-11-12 RX ORDER — HYDROCORTISONE 25 MG/G
CREAM TOPICAL 2 TIMES DAILY
Qty: 30 G | Refills: 1 | Status: SHIPPED | OUTPATIENT
Start: 2024-11-12

## 2024-11-13 NOTE — ASSESSMENT & PLAN NOTE
Lab Results   Component Value Date    HGBA1C 5.9 11/12/2024    HGBA1C 6.4 (H) 02/03/2024   .   Managed with lifestyle modification.  Encouraged to continue.    Orders:    Hemoglobin A1C; Future    CBC; Future

## 2024-11-13 NOTE — ASSESSMENT & PLAN NOTE
Blood pressure within acceptable parameters  Will continue her current regimen of olmesartan 5 mg daily    Orders:    Comprehensive metabolic panel; Future    CBC; Future

## 2024-11-13 NOTE — PROGRESS NOTES
"INTERNAL MEDICINE OFFICE VISIT NOTE  [facility] Internal Medicine [place]    NAME: Leatha Wells  AGE: 69 y.o. SEX: female    DATE OF ENCOUNTER:       Chief Complaint   Patient presents with    Follow-up     4 month follow up          Assessment & Plan  Primary hypertension  Blood pressure within acceptable parameters  Will continue her current regimen of olmesartan 5 mg daily    Orders:    Comprehensive metabolic panel; Future    CBC; Future    Impaired fasting glucose  Lab Results   Component Value Date    HGBA1C 5.9 11/12/2024    HGBA1C 6.4 (H) 02/03/2024   .   Managed with lifestyle modification.  Encouraged to continue.    Orders:    Hemoglobin A1C; Future    CBC; Future    Pre-diabetes    Orders:    POCT hemoglobin A1c    CBC; Future    Encounter for immunization    Orders:    Fluzone High-Dose 0.5 mL IM    Eczema, unspecified type    Orders:    hydrocortisone 2.5 % cream; Apply topically 2 (two) times a day    Hypothyroidism due to acquired atrophy of thyroid    Orders:    TSH, 3rd generation with Free T4 reflex; Future    CBC; Future    Dyslipidemia        Orders:    Lipid Panel with Direct LDL reflex; Future    CBC; Future      Return in 4 months (on 3/12/2025) for AWV after 2/8/25.      OBJECTIVE:  Vitals:    11/12/24 1638   BP: 138/75   BP Location: Left arm   Patient Position: Sitting   Cuff Size: Standard   Pulse: 83   Temp: (!) 96.9 °F (36.1 °C)   Weight: 62.1 kg (137 lb)   Height: 4' 11\" (1.499 m)         Physical Exam:   GENERAL: NAD, Normal appearance.    NEUROLOGIC:  Alert/oriented x3.  HEENT:  NC/AT, no scleral icterus  CARDIAC:  RRR, +S1/S2  PULMONARY:  non-labored breathing        Current Outpatient Medications:     fluticasone (FLONASE) 50 mcg/act nasal spray, 2 sprays into each nostril daily, Disp: 48 mL, Rfl: 1    hydrocortisone 2.5 % cream, Apply topically 2 (two) times a day, Disp: 30 g, Rfl: 1    ibuprofen (MOTRIN) 800 mg tablet, Take 1 tablet (800 mg total) by mouth every 6 (six) " hours as needed for mild pain or moderate pain, Disp: 30 tablet, Rfl: 0    levothyroxine 75 mcg tablet, Take 1 tablet once a day 6 days a week -, Disp: 100 tablet, Rfl: 1    olmesartan (BENICAR) 5 mg tablet, TAKE 1 TABLET (5 MG TOTAL) BY MOUTH DAILY., Disp: 90 tablet, Rfl: 1    rosuvastatin (CRESTOR) 20 MG tablet, TAKE 1 TABLET BY MOUTH EVERY DAY, Disp: 90 tablet, Rfl: 1    tretinoin (RETIN-A) 0.025 % cream, Apply topically daily at bedtime, Disp: 20 g, Rfl: 1    Patient Active Problem List   Diagnosis    Depression with anxiety    Chronic bundle branch block    Hypothyroidism due to acquired atrophy of thyroid    Primary hypertension    Lipodystrophy of abdomen    Vitamin D deficiency    Allergic rhinitis    h/o Complete tear of left rotator cuff    Eczema    Dyslipidemia    Microscopic hematuria    Spondylosis    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, bilateral    Solar lentigo    Impaired fasting glucose           Kurt Baptiste MD  [facility] Internal Medicine [place]    * Please Note: This note was completed in part utilizing a dictation software.  Grammatical errors, random word insertions, spelling mistakes, and incomplete sentences may be an occasional consequence of this system secondary to software limitations, ambient noise, and hardware issues.  If you have any questions or concerns about the content, text, or information contained within the body of this dictation, please contact the provider for clarification.**

## 2024-12-23 ENCOUNTER — OFFICE VISIT (OUTPATIENT)
Dept: DENTISTRY | Facility: CLINIC | Age: 69
End: 2024-12-23

## 2024-12-23 VITALS — SYSTOLIC BLOOD PRESSURE: 137 MMHG | HEART RATE: 83 BPM | DIASTOLIC BLOOD PRESSURE: 80 MMHG | TEMPERATURE: 96.4 F

## 2024-12-23 DIAGNOSIS — K05.6 PERIODONTAL DISEASE: Primary | ICD-10-CM

## 2024-12-23 PROCEDURE — D4910 PERIODONTAL MAINTENANCE: HCPCS | Performed by: DENTAL HYGIENIST

## 2024-12-23 PROCEDURE — D0120 PERIODIC ORAL EVALUATION - ESTABLISHED PATIENT: HCPCS

## 2024-12-23 NOTE — PROGRESS NOTES
Periodic Exam and 3 Month Periodontal Maintenance     REVIEWED MED HX: meds, allergies, health changes reviewed in EPIC  CHIEF CONCERN:  none   PAIN SCALE:  0  ASA CLASS:  ASA 2 - Patient with mild systemic disease with no functional limitations  PLAQUE:  mild  CALCULUS:  Light  BLEEDING:  none  STAIN :   Light  Moderate  PERIO:  Mild to mod bone loss and recession    Hygiene Procedures: Scaled, Polished, Flossed and Used Cavitron    Oral Hygiene Instruction: Brushing Minimum 2x daily for 2 minutes, daily flossing, Listerine, and Recommended soft toothbrush only    Visual and Tactile Intraoral/ Extraoral evaluation: Oral and Oropharyngeal cancer evaluation. No findings   ---Bilateral mandibular susan    REFERRALS: none    EXAM:   Dr Quintero    CARIES FINDINGS:   5 B(V)    Next Recall:   NV1:  Rest - 5 B(V) - 45 min  NV2:  Perio main - 50 min - 3 months from 12/ 23/ 24    Last BWX:   1/26/21 -- 7 - vertical  Last Panorex:  8/31/21       FMX :  4/11/24

## 2025-02-12 DIAGNOSIS — I10 PRIMARY HYPERTENSION: ICD-10-CM

## 2025-02-12 RX ORDER — OLMESARTAN MEDOXOMIL 5 MG/1
5 TABLET ORAL DAILY
Qty: 90 TABLET | Refills: 1 | Status: SHIPPED | OUTPATIENT
Start: 2025-02-12 | End: 2025-08-11

## 2025-03-06 ENCOUNTER — RA CDI HCC (OUTPATIENT)
Dept: OTHER | Facility: HOSPITAL | Age: 70
End: 2025-03-06

## 2025-03-08 ENCOUNTER — APPOINTMENT (OUTPATIENT)
Dept: LAB | Facility: HOSPITAL | Age: 70
End: 2025-03-08
Payer: COMMERCIAL

## 2025-03-08 DIAGNOSIS — E78.5 DYSLIPIDEMIA: ICD-10-CM

## 2025-03-08 DIAGNOSIS — I10 PRIMARY HYPERTENSION: ICD-10-CM

## 2025-03-08 DIAGNOSIS — R73.03 PRE-DIABETES: ICD-10-CM

## 2025-03-08 DIAGNOSIS — R73.01 IMPAIRED FASTING GLUCOSE: ICD-10-CM

## 2025-03-08 DIAGNOSIS — E03.4 HYPOTHYROIDISM DUE TO ACQUIRED ATROPHY OF THYROID: ICD-10-CM

## 2025-03-08 LAB
ALBUMIN SERPL BCG-MCNC: 4.2 G/DL (ref 3.5–5)
ALP SERPL-CCNC: 79 U/L (ref 34–104)
ALT SERPL W P-5'-P-CCNC: 35 U/L (ref 7–52)
ANION GAP SERPL CALCULATED.3IONS-SCNC: 6 MMOL/L (ref 4–13)
AST SERPL W P-5'-P-CCNC: 26 U/L (ref 13–39)
BILIRUB SERPL-MCNC: 0.56 MG/DL (ref 0.2–1)
BUN SERPL-MCNC: 26 MG/DL (ref 5–25)
CALCIUM SERPL-MCNC: 9.6 MG/DL (ref 8.4–10.2)
CHLORIDE SERPL-SCNC: 105 MMOL/L (ref 96–108)
CHOLEST SERPL-MCNC: 142 MG/DL (ref ?–200)
CO2 SERPL-SCNC: 27 MMOL/L (ref 21–32)
CREAT SERPL-MCNC: 0.82 MG/DL (ref 0.6–1.3)
ERYTHROCYTE [DISTWIDTH] IN BLOOD BY AUTOMATED COUNT: 14 % (ref 11.6–15.1)
EST. AVERAGE GLUCOSE BLD GHB EST-MCNC: 134 MG/DL
GFR SERPL CREATININE-BSD FRML MDRD: 73 ML/MIN/1.73SQ M
GLUCOSE P FAST SERPL-MCNC: 89 MG/DL (ref 65–99)
HBA1C MFR BLD: 6.3 %
HCT VFR BLD AUTO: 40.4 % (ref 34.8–46.1)
HDLC SERPL-MCNC: 81 MG/DL
HGB BLD-MCNC: 12.6 G/DL (ref 11.5–15.4)
LDLC SERPL CALC-MCNC: 55 MG/DL (ref 0–100)
MCH RBC QN AUTO: 25.8 PG (ref 26.8–34.3)
MCHC RBC AUTO-ENTMCNC: 31.2 G/DL (ref 31.4–37.4)
MCV RBC AUTO: 83 FL (ref 82–98)
PLATELET # BLD AUTO: 262 THOUSANDS/UL (ref 149–390)
PMV BLD AUTO: 11.7 FL (ref 8.9–12.7)
POTASSIUM SERPL-SCNC: 4.4 MMOL/L (ref 3.5–5.3)
PROT SERPL-MCNC: 7.4 G/DL (ref 6.4–8.4)
RBC # BLD AUTO: 4.89 MILLION/UL (ref 3.81–5.12)
SODIUM SERPL-SCNC: 138 MMOL/L (ref 135–147)
TRIGL SERPL-MCNC: 31 MG/DL (ref ?–150)
TSH SERPL DL<=0.05 MIU/L-ACNC: 1.62 UIU/ML (ref 0.45–4.5)
WBC # BLD AUTO: 7.65 THOUSAND/UL (ref 4.31–10.16)

## 2025-03-08 PROCEDURE — 80061 LIPID PANEL: CPT

## 2025-03-08 PROCEDURE — 83036 HEMOGLOBIN GLYCOSYLATED A1C: CPT

## 2025-03-08 PROCEDURE — 84443 ASSAY THYROID STIM HORMONE: CPT

## 2025-03-08 PROCEDURE — 36415 COLL VENOUS BLD VENIPUNCTURE: CPT

## 2025-03-08 PROCEDURE — 80053 COMPREHEN METABOLIC PANEL: CPT

## 2025-03-08 PROCEDURE — 85027 COMPLETE CBC AUTOMATED: CPT

## 2025-03-09 ENCOUNTER — RESULTS FOLLOW-UP (OUTPATIENT)
Dept: OTHER | Facility: HOSPITAL | Age: 70
End: 2025-03-09

## 2025-03-13 ENCOUNTER — OFFICE VISIT (OUTPATIENT)
Dept: INTERNAL MEDICINE CLINIC | Facility: CLINIC | Age: 70
End: 2025-03-13
Payer: COMMERCIAL

## 2025-03-13 VITALS
DIASTOLIC BLOOD PRESSURE: 77 MMHG | BODY MASS INDEX: 27.01 KG/M2 | WEIGHT: 134 LBS | SYSTOLIC BLOOD PRESSURE: 133 MMHG | HEART RATE: 94 BPM | HEIGHT: 59 IN | TEMPERATURE: 97 F

## 2025-03-13 DIAGNOSIS — M25.511 BILATERAL SHOULDER PAIN, UNSPECIFIED CHRONICITY: ICD-10-CM

## 2025-03-13 DIAGNOSIS — M25.512 BILATERAL SHOULDER PAIN, UNSPECIFIED CHRONICITY: ICD-10-CM

## 2025-03-13 DIAGNOSIS — R73.03 PREDIABETES: ICD-10-CM

## 2025-03-13 DIAGNOSIS — Z00.00 MEDICARE ANNUAL WELLNESS VISIT, SUBSEQUENT: Primary | ICD-10-CM

## 2025-03-13 DIAGNOSIS — B35.8 TINEA FACIALE: ICD-10-CM

## 2025-03-13 DIAGNOSIS — M85.89 OSTEOPENIA OF MULTIPLE SITES: ICD-10-CM

## 2025-03-13 DIAGNOSIS — I10 PRIMARY HYPERTENSION: ICD-10-CM

## 2025-03-13 DIAGNOSIS — E03.4 HYPOTHYROIDISM DUE TO ACQUIRED ATROPHY OF THYROID: ICD-10-CM

## 2025-03-13 DIAGNOSIS — Z12.31 ENCOUNTER FOR SCREENING MAMMOGRAM FOR BREAST CANCER: ICD-10-CM

## 2025-03-13 DIAGNOSIS — E78.5 DYSLIPIDEMIA: ICD-10-CM

## 2025-03-13 DIAGNOSIS — E55.9 VITAMIN D DEFICIENCY: ICD-10-CM

## 2025-03-13 PROCEDURE — 99214 OFFICE O/P EST MOD 30 MIN: CPT | Performed by: STUDENT IN AN ORGANIZED HEALTH CARE EDUCATION/TRAINING PROGRAM

## 2025-03-13 PROCEDURE — G0439 PPPS, SUBSEQ VISIT: HCPCS | Performed by: STUDENT IN AN ORGANIZED HEALTH CARE EDUCATION/TRAINING PROGRAM

## 2025-03-13 PROCEDURE — G2211 COMPLEX E/M VISIT ADD ON: HCPCS | Performed by: STUDENT IN AN ORGANIZED HEALTH CARE EDUCATION/TRAINING PROGRAM

## 2025-03-13 RX ORDER — IBUPROFEN 800 MG/1
800 TABLET, FILM COATED ORAL EVERY 6 HOURS PRN
Qty: 30 TABLET | Refills: 0 | Status: SHIPPED | OUTPATIENT
Start: 2025-03-13 | End: 2025-04-12

## 2025-03-13 RX ORDER — KETOCONAZOLE 20 MG/G
CREAM TOPICAL DAILY
Qty: 30 G | Refills: 1 | Status: SHIPPED | OUTPATIENT
Start: 2025-03-13

## 2025-03-13 RX ORDER — LEVOTHYROXINE SODIUM 75 UG/1
TABLET ORAL
Qty: 100 TABLET | Refills: 1 | Status: SHIPPED | OUTPATIENT
Start: 2025-03-13

## 2025-03-13 RX ORDER — ROSUVASTATIN CALCIUM 20 MG/1
20 TABLET, COATED ORAL DAILY
Qty: 90 TABLET | Refills: 1 | Status: SHIPPED | OUTPATIENT
Start: 2025-03-13

## 2025-03-13 RX ORDER — OLMESARTAN MEDOXOMIL 5 MG/1
5 TABLET ORAL DAILY
Qty: 90 TABLET | Refills: 1 | Status: SHIPPED | OUTPATIENT
Start: 2025-03-13 | End: 2025-09-09

## 2025-03-13 NOTE — ASSESSMENT & PLAN NOTE
Lab Results   Component Value Date    OYP6HPUAAPML 1.622 03/08/2025    PQT9EHIASEVC 1.527 07/03/2024    FREET4 1.14 (H) 02/03/2024    FREET4 1.26 02/08/2021      Controlled on levothyroxine 75 mcg 6 times per week-continue    Orders:    levothyroxine 75 mcg tablet; Take 1 tablet once a day 6 days a week -

## 2025-03-13 NOTE — ASSESSMENT & PLAN NOTE
Lab Results   Component Value Date    GNBP92CFFULW 30.8 02/03/2024    YYRK42IRVOXQ 32.2 05/29/2020     Currently on vitamin D supplementation-continue

## 2025-03-13 NOTE — ASSESSMENT & PLAN NOTE
Lab Results   Component Value Date    HGBA1C 6.3 (H) 03/08/2025    HGBA1C 5.9 11/12/2024    HGBA1C 6.4 (H) 02/03/2024   .   Managed with lifestyle modification.  Encouraged to continue.

## 2025-03-13 NOTE — PROGRESS NOTES
Name: Leatha Wells      : 1955      MRN: [id number]  Encounter Provider: Kurt Baptiste MD  Encounter Date: 3/13/2025   Encounter department: [facility] [place]    Assessment & Plan  Medicare annual wellness visit, subsequent         Tinea faciale  Localized in the bilateral submandibular area and in jawline  -Start ketoconazole cream daily    Orders:    ketoconazole (NIZORAL) 2 % cream; Apply topically daily    Hypothyroidism due to acquired atrophy of thyroid  Lab Results   Component Value Date    [id number] 1.622 2025    [id number] 1.527 2024    FREET4 1.14 (H) 2024    FREET4 1.26 2021      Controlled on levothyroxine 75 mcg 6 times per week-continue    Orders:    levothyroxine 75 mcg tablet; Take 1 tablet once a day 6 days a week -    Dyslipidemia    Orders:    rosuvastatin (CRESTOR) 20 MG tablet; Take 1 tablet (20 mg total) by mouth daily    Primary hypertension  Blood pressure within acceptable parameters  Will continue her current regimen of olmesartan 5 mg daily    Orders:    olmesartan (BENICAR) 5 mg tablet; Take 1 tablet (5 mg total) by mouth daily      Prediabetes  Lab Results   Component Value Date    HGBA1C 6.3 (H) 2025    HGBA1C 5.9 2024    HGBA1C 6.4 (H) 2024   .   Managed with lifestyle modification.  Encouraged to continue.           Vitamin D deficiency  Lab Results   Component Value Date    [id number] 30.8 2024    [id number] 32.2 2020     Currently on vitamin D supplementation-continue           Encounter for screening mammogram for breast cancer    Orders:    Mammo screening bilateral w 3d and cad; Future    Osteopenia of multiple sites  Currently on OTC supplemental vitamin D and calcium-continue         Bilateral shoulder pain, unspecified chronicity    Orders:    ibuprofen (MOTRIN) 800 mg tablet; Take 1 tablet (800 mg total) by mouth every 6 (six) hours as needed for mild pain or moderate pain        Preventive health issues were discussed with patient, and age appropriate screening tests were ordered as noted in patient's After Visit Summary. Personalized health advice and appropriate referrals for health education or preventive services given if needed, as noted in patient's After Visit Summary.    History of Present Illness     HPI   Patient Care Team:  Kurt Baptiste MD as PCP - General (Internal Medicine)  MD Nicola Leigh MD Harshini Dhananjay, DO Doron Rabin, MD Jose Ramon Garcia, MD Hazel Tuazon, MD    Review of Systems  Medical History Reviewed by provider this encounter:       Annual Wellness Visit Questionnaire   Leatha is here for her Subsequent Wellness visit.     Health Risk Assessment:   Patient rates overall health as good. Patient feels that their physical health rating is same. Patient is satisfied with their life. Eyesight was rated as same. Hearing was rated as same. Patient feels that their emotional and mental health rating is much better. Patients states they are never, rarely angry. Patient states they are never, rarely unusually tired/fatigued. Pain experienced in the last 7 days has been some. Patient's pain rating has been 5/10. Patient states that she has experienced no weight loss or gain in last 6 months.     Depression Screening:   PHQ-9 Score: 3      Fall Risk Screening:   In the past year, patient has experienced: no history of falling in past year      Urinary Incontinence Screening:   Patient has not leaked urine accidently in the last six months.     Home Safety:  Patient does not have trouble with stairs inside or outside of their home. Patient has working smoke alarms and has working carbon monoxide detector. Home safety hazards include: none.     Nutrition:   Current diet is Regular, Low Saturated Fat and Limited junk food.     Medications:   Patient is currently taking over-the-counter supplements. OTC medications include: see  medication list. Patient is able to manage medications.     Activities of Daily Living (ADLs)/Instrumental Activities of Daily Living (IADLs):   Walk and transfer into and out of bed and chair?: Yes  Dress and groom yourself?: Yes    Bathe or shower yourself?: Yes    Feed yourself? Yes  Do your laundry/housekeeping?: Yes  Manage your money, pay your bills and track your expenses?: Yes  Make your own meals?: Yes    Do your own shopping?: Yes    Previous Hospitalizations:   Any hospitalizations or ED visits within the last 12 months?: No      Advance Care Planning:   Living will: No    Durable POA for healthcare: No    Advanced directive: No      Cognitive Screening:   Provider or family/friend/caregiver concerned regarding cognition?: No    PREVENTIVE SCREENINGS      Cardiovascular Screening:    General: Screening Current      Diabetes Screening:     General: Screening Current      Colorectal Cancer Screening:     General: Screening Current      Breast Cancer Screening:     General: Screening Current      Cervical Cancer Screening:    General: Screening Not Indicated      Osteoporosis Screening:    General: Screening Current      Abdominal Aortic Aneurysm (AAA) Screening:        General: Screening Not Indicated      Lung Cancer Screening:     General: Screening Not Indicated      Hepatitis C Screening:    General: Screening Current    Screening, Brief Intervention, and Referral to Treatment (SBIRT)     Screening  Typical number of drinks in a day: 0  Typical number of drinks in a week: 0  Interpretation: Low risk drinking behavior.    AUDIT-C Screenin) How often did you have a drink containing alcohol in the past year? monthly or less  2) How many drinks did you have on a typical day when you were drinking in the past year? 1 to 2  3) How often did you have 6 or more drinks on one occasion in the past year? never    AUDIT-C Score: 1  Interpretation: Score 0-2 (female): Negative screen for alcohol  "misuse    Single Item Drug Screening:  How often have you used an illegal drug (including marijuana) or a prescription medication for non-medical reasons in the past year? never    Single Item Drug Screen Score: 0  Interpretation: Negative screen for possible drug use disorder    Social Drivers of Health     Financial Resource Strain: Low Risk  (2/5/2025)    Overall Financial Resource Strain (CARDIA)     Difficulty of Paying Living Expenses: Not very hard   Food Insecurity: No Food Insecurity (3/11/2025)    Hunger Vital Sign     Worried About Running Out of Food in the Last Year: Never true     Ran Out of Food in the Last Year: Never true   Transportation Needs: No Transportation Needs (3/11/2025)    PRAPARE - Transportation     Lack of Transportation (Medical): No     Lack of Transportation (Non-Medical): No   Housing Stability: Low Risk  (3/11/2025)    Housing Stability Vital Sign     Unable to Pay for Housing in the Last Year: No     Number of Times Moved in the Last Year: 0     Homeless in the Last Year: No   Recent Concern: Housing Stability - High Risk (2/5/2025)    Housing Stability Vital Sign     Unable to Pay for Housing in the Last Year: No     Number of Times Moved in the Last Year: 2     Homeless in the Last Year: No   Utilities: Not At Risk (3/11/2025)    [facility] Utilities     Threatened with loss of utilities: No     No results found.    Objective   Ht 4' 11\" (1.499 m)   BMI 27.67 kg/m²     Physical Exam    "

## 2025-03-13 NOTE — PATIENT INSTRUCTIONS
Medicare Preventive Visit Patient Instructions  Thank you for completing your Welcome to Medicare Visit or Medicare Annual Wellness Visit today. Your next wellness visit will be due in one year (3/14/2026).  The screening/preventive services that you may require over the next 5-10 years are detailed below. Some tests may not apply to you based off risk factors and/or age. Screening tests ordered at today's visit but not completed yet may show as past due. Also, please note that scanned in results may not display below.  Preventive Screenings:  Service Recommendations Previous Testing/Comments   Colorectal Cancer Screening  * Colonoscopy    * Fecal Occult Blood Test (FOBT)/Fecal Immunochemical Test (FIT)  * Fecal DNA/Cologuard Test  * Flexible Sigmoidoscopy Age: 45-75 years old   Colonoscopy: every 10 years (may be performed more frequently if at higher risk)  OR  FOBT/FIT: every 1 year  OR  Cologuard: every 3 years  OR  Sigmoidoscopy: every 5 years  Screening may be recommended earlier than age 45 if at higher risk for colorectal cancer. Also, an individualized decision between you and your healthcare provider will decide whether screening between the ages of 76-85 would be appropriate. Colonoscopy: 06/27/2024  FOBT/FIT: 04/20/2023  Cologuard: Not on file  Sigmoidoscopy: Not on file    Screening Current     Breast Cancer Screening Age: 40+ years old  Frequency: every 1-2 years  Not required if history of left and right mastectomy Mammogram: 05/24/2024    Screening Current   Cervical Cancer Screening Between the ages of 21-29, pap smear recommended once every 3 years.   Between the ages of 30-65, can perform pap smear with HPV co-testing every 5 years.   Recommendations may differ for women with a history of total hysterectomy, cervical cancer, or abnormal pap smears in past. Pap Smear: 10/02/2017    Screening Not Indicated   Hepatitis C Screening Once for adults born between 1945 and 1965  More frequently in  patients at high risk for Hepatitis C Hep C Antibody: 05/29/2020    Screening Current   Diabetes Screening 1-2 times per year if you're at risk for diabetes or have pre-diabetes Fasting glucose: 89 mg/dL (3/8/2025)  A1C: 6.3 % (3/8/2025)  Screening Current   Cholesterol Screening Once every 5 years if you don't have a lipid disorder. May order more often based on risk factors. Lipid panel: 03/08/2025    Screening Current     Other Preventive Screenings Covered by Medicare:  Abdominal Aortic Aneurysm (AAA) Screening: covered once if your at risk. You're considered to be at risk if you have a family history of AAA.  Lung Cancer Screening: covers low dose CT scan once per year if you meet all of the following conditions: (1) Age 55-77; (2) No signs or symptoms of lung cancer; (3) Current smoker or have quit smoking within the last 15 years; (4) You have a tobacco smoking history of at least 20 pack years (packs per day multiplied by number of years you smoked); (5) You get a written order from a healthcare provider.  Glaucoma Screening: covered annually if you're considered high risk: (1) You have diabetes OR (2) Family history of glaucoma OR (3)  aged 50 and older OR (4)  American aged 65 and older  Osteoporosis Screening: covered every 2 years if you meet one of the following conditions: (1) You're estrogen deficient and at risk for osteoporosis based off medical history and other findings; (2) Have a vertebral abnormality; (3) On glucocorticoid therapy for more than 3 months; (4) Have primary hyperparathyroidism; (5) On osteoporosis medications and need to assess response to drug therapy.   Last bone density test (DXA Scan): 06/28/2024.  HIV Screening: covered annually if you're between the age of 15-65. Also covered annually if you are younger than 15 and older than 65 with risk factors for HIV infection. For pregnant patients, it is covered up to 3 times per  pregnancy.    Immunizations:  Immunization Recommendations   Influenza Vaccine Annual influenza vaccination during flu season is recommended for all persons aged >= 6 months who do not have contraindications   Pneumococcal Vaccine   * Pneumococcal conjugate vaccine = PCV13 (Prevnar 13), PCV15 (Vaxneuvance), PCV20 (Prevnar 20)  * Pneumococcal polysaccharide vaccine = PPSV23 (Pneumovax) Adults 19-65 yo with certain risk factors or if 65+ yo  If never received any pneumonia vaccine: recommend Prevnar 20 (PCV20)  Give PCV20 if previously received 1 dose of PCV13 or PPSV23   Hepatitis B Vaccine 3 dose series if at intermediate or high risk (ex: diabetes, end stage renal disease, liver disease)   Respiratory syncytial virus (RSV) Vaccine - COVERED BY MEDICARE PART D  * RSVPreF3 (Arexvy) CDC recommends that adults 60 years of age and older may receive a single dose of RSV vaccine using shared clinical decision-making (SCDM)   Tetanus (Td) Vaccine - COST NOT COVERED BY MEDICARE PART B Following completion of primary series, a booster dose should be given every 10 years to maintain immunity against tetanus. Td may also be given as tetanus wound prophylaxis.   Tdap Vaccine - COST NOT COVERED BY MEDICARE PART B Recommended at least once for all adults. For pregnant patients, recommended with each pregnancy.   Shingles Vaccine (Shingrix) - COST NOT COVERED BY MEDICARE PART B  2 shot series recommended in those 19 years and older who have or will have weakened immune systems or those 50 years and older     Health Maintenance Due:      Topic Date Due   • Breast Cancer Screening: Mammogram  05/24/2025   • Colorectal Cancer Screening  06/25/2034   • Hepatitis C Screening  Completed     Immunizations Due:      Topic Date Due   • Pneumococcal Vaccine: 65+ Years (1 of 1 - PCV) Never done   • COVID-19 Vaccine (3 - 2024-25 season) 09/01/2024     Advance Directives   What are advance directives?  Advance directives are legal documents  that state your wishes and plans for medical care. These plans are made ahead of time in case you lose your ability to make decisions for yourself. Advance directives can apply to any medical decision, such as the treatments you want, and if you want to donate organs.   What are the types of advance directives?  There are many types of advance directives, and each state has rules about how to use them. You may choose a combination of any of the following:  Living will:  This is a written record of the treatment you want. You can also choose which treatments you do not want, which to limit, and which to stop at a certain time. This includes surgery, medicine, IV fluid, and tube feedings.   Durable power of  for healthcare (DPAHC):  This is a written record that states who you want to make healthcare choices for you when you are unable to make them for yourself. This person, called a proxy, is usually a family member or a friend. You may choose more than 1 proxy.  Do not resuscitate (DNR) order:  A DNR order is used in case your heart stops beating or you stop breathing. It is a request not to have certain forms of treatment, such as CPR. A DNR order may be included in other types of advance directives.  Medical directive:  This covers the care that you want if you are in a coma, near death, or unable to make decisions for yourself. You can list the treatments you want for each condition. Treatment may include pain medicine, surgery, blood transfusions, dialysis, IV or tube feedings, and a ventilator (breathing machine).  Values history:  This document has questions about your views, beliefs, and how you feel and think about life. This information can help others choose the care that you would choose.  Why are advance directives important?  An advance directive helps you control your care. Although spoken wishes may be used, it is better to have your wishes written down. Spoken wishes can be misunderstood, or  not followed. Treatments may be given even if you do not want them. An advance directive may make it easier for your family to make difficult choices about your care.   Weight Management   Why it is important to manage your weight:  Being overweight increases your risk of health conditions such as heart disease, high blood pressure, type 2 diabetes, and certain types of cancer. It can also increase your risk for osteoarthritis, sleep apnea, and other respiratory problems. Aim for a slow, steady weight loss. Even a small amount of weight loss can lower your risk of health problems.  How to lose weight safely:  A safe and healthy way to lose weight is to eat fewer calories and get regular exercise. You can lose up about 1 pound a week by decreasing the number of calories you eat by 500 calories each day.   Healthy meal plan for weight management:  A healthy meal plan includes a variety of foods, contains fewer calories, and helps you stay healthy. A healthy meal plan includes the following:  Eat whole-grain foods more often.  A healthy meal plan should contain fiber. Fiber is the part of grains, fruits, and vegetables that is not broken down by your body. Whole-grain foods are healthy and provide extra fiber in your diet. Some examples of whole-grain foods are whole-wheat breads and pastas, oatmeal, brown rice, and bulgur.  Eat a variety of vegetables every day.  Include dark, leafy greens such as spinach, kale, alessia greens, and mustard greens. Eat yellow and orange vegetables such as carrots, sweet potatoes, and winter squash.   Eat a variety of fruits every day.  Choose fresh or canned fruit (canned in its own juice or light syrup) instead of juice. Fruit juice has very little or no fiber.  Eat low-fat dairy foods.  Drink fat-free (skim) milk or 1% milk. Eat fat-free yogurt and low-fat cottage cheese. Try low-fat cheeses such as mozzarella and other reduced-fat cheeses.  Choose meat and other protein foods that  are low in fat.  Choose beans or other legumes such as split peas or lentils. Choose fish, skinless poultry (chicken or turkey), or lean cuts of red meat (beef or pork). Before you cook meat or poultry, cut off any visible fat.   Use less fat and oil.  Try baking foods instead of frying them. Add less fat, such as margarine, sour cream, regular salad dressing and mayonnaise to foods. Eat fewer high-fat foods. Some examples of high-fat foods include french fries, doughnuts, ice cream, and cakes.  Eat fewer sweets.  Limit foods and drinks that are high in sugar. This includes candy, cookies, regular soda, and sweetened drinks.  Exercise:  Exercise at least 30 minutes per day on most days of the week. Some examples of exercise include walking, biking, dancing, and swimming. You can also fit in more physical activity by taking the stairs instead of the elevator or parking farther away from stores. Ask your healthcare provider about the best exercise plan for you.      © Copyright [employer] 2018 Information is for End User's use only and may not be sold, redistributed or otherwise used for commercial purposes. All illustrations and images included in CareNotes® are the copyrighted property of [employer] Inc[employer] or [employer]

## 2025-03-13 NOTE — ASSESSMENT & PLAN NOTE
Blood pressure within acceptable parameters  Will continue her current regimen of olmesartan 5 mg daily    Orders:    olmesartan (BENICAR) 5 mg tablet; Take 1 tablet (5 mg total) by mouth daily

## 2025-03-13 NOTE — ASSESSMENT & PLAN NOTE
Localized in the bilateral submandibular area and in jawline  -Start ketoconazole cream daily    Orders:    ketoconazole (NIZORAL) 2 % cream; Apply topically daily

## 2025-03-18 ENCOUNTER — OFFICE VISIT (OUTPATIENT)
Dept: DENTISTRY | Facility: CLINIC | Age: 70
End: 2025-03-18

## 2025-03-18 VITALS — DIASTOLIC BLOOD PRESSURE: 84 MMHG | TEMPERATURE: 99.1 F | HEART RATE: 77 BPM | SYSTOLIC BLOOD PRESSURE: 122 MMHG

## 2025-03-18 DIAGNOSIS — K08.9: Primary | ICD-10-CM

## 2025-03-18 PROCEDURE — D2391 RESIN-BASED COMPOSITE - 1 SURFACE, POSTERIOR: HCPCS | Performed by: DENTIST

## 2025-03-18 NOTE — PROGRESS NOTES
Composite Restoration     69 y.o. patient presents for composite filling. PMH reviewed, no changes.     Discussed with patient need for RCT if pulp exposure occurs or in future if pulp is inflamed. Pt understands and consents.     Applied topical benzocaine, administered Not needed because Non-carious cervical lesion     Prepped tooth #5 buccal and cervical  with beveling elina and # 2 round carbide on slow speed. Placed not applicable/needed for this restoration Isolation with Cotton rolls     Etch with 37% H2PO4, rinse, dry. Applied Adhese with 20 second scrub once, gentle air dry and light cured for 10s. Restored with Tetric bulk mehul Shade A2 and light cured.     Refined with finishing burs, disks,  polished with enhance point. Verified occlusion and contacts. Pt left satisfied.     NV: Recall when due

## 2025-05-19 ENCOUNTER — RA CDI HCC (OUTPATIENT)
Dept: OTHER | Facility: HOSPITAL | Age: 70
End: 2025-05-19

## 2025-05-27 ENCOUNTER — OFFICE VISIT (OUTPATIENT)
Dept: INTERNAL MEDICINE CLINIC | Facility: CLINIC | Age: 70
End: 2025-05-27
Payer: COMMERCIAL

## 2025-05-27 VITALS
HEIGHT: 59 IN | SYSTOLIC BLOOD PRESSURE: 134 MMHG | DIASTOLIC BLOOD PRESSURE: 69 MMHG | BODY MASS INDEX: 27.01 KG/M2 | TEMPERATURE: 97 F | WEIGHT: 134 LBS | HEART RATE: 77 BPM

## 2025-05-27 DIAGNOSIS — L23.9 ALLERGIC DERMATITIS: Primary | ICD-10-CM

## 2025-05-27 DIAGNOSIS — M25.511 BILATERAL SHOULDER PAIN, UNSPECIFIED CHRONICITY: ICD-10-CM

## 2025-05-27 DIAGNOSIS — I10 PRIMARY HYPERTENSION: ICD-10-CM

## 2025-05-27 DIAGNOSIS — R73.03 PREDIABETES: ICD-10-CM

## 2025-05-27 DIAGNOSIS — M25.512 BILATERAL SHOULDER PAIN, UNSPECIFIED CHRONICITY: ICD-10-CM

## 2025-05-27 PROBLEM — B35.8 TINEA FACIALE: Status: RESOLVED | Noted: 2025-03-13 | Resolved: 2025-05-27

## 2025-05-27 PROCEDURE — 99214 OFFICE O/P EST MOD 30 MIN: CPT | Performed by: STUDENT IN AN ORGANIZED HEALTH CARE EDUCATION/TRAINING PROGRAM

## 2025-05-27 PROCEDURE — G2211 COMPLEX E/M VISIT ADD ON: HCPCS | Performed by: STUDENT IN AN ORGANIZED HEALTH CARE EDUCATION/TRAINING PROGRAM

## 2025-05-27 RX ORDER — IBUPROFEN 800 MG/1
800 TABLET, FILM COATED ORAL EVERY 6 HOURS PRN
Qty: 30 TABLET | Refills: 0 | Status: SHIPPED | OUTPATIENT
Start: 2025-05-27 | End: 2025-06-26

## 2025-05-27 NOTE — PROGRESS NOTES
"INTERNAL MEDICINE OFFICE VISIT NOTE  [facility] [place]    NAME: Laetha Wells  AGE: 70 y.o. SEX: female    DATE OF ENCOUNTER:       Chief Complaint   Patient presents with    Follow-up     2 month follow up          Assessment & Plan  Allergic dermatitis  Reports she continues to experience severe itching sensation in her neck every time she wears clothing that covers her neck.  She denies any changes in her detergent in the last few months, new cosmetic products, personal hygiene products, foods  Currently she does not have any rashes or lesions in her neck  Responds well to hydrocortisone cream  She is requesting a referral to an allergist.  Ordered    Orders:    Ambulatory Referral to Allergy; Future    Bilateral shoulder pain, unspecified chronicity  Long history of bilateral shoulder pain that respond well to ibuprofen 800 mg as needed-continue    Orders:    ibuprofen (MOTRIN) 800 mg tablet; Take 1 tablet (800 mg total) by mouth every 6 (six) hours as needed for mild pain or moderate pain    Prediabetes    Orders:    Hemoglobin A1C; Future    Primary hypertension  Blood pressure within acceptable parameters  Will continue her current regimen of olmesartan 5 mg daily               Return in about 4 months (around 9/27/2025).      OBJECTIVE:  Vitals:    05/27/25 1732   BP: 134/69   BP Location: Left arm   Patient Position: Sitting   Cuff Size: Standard   Pulse: 77   Temp: (!) 97 °F (36.1 °C)   Weight: 60.8 kg (134 lb)   Height: 4' 11\" (1.499 m)         Physical Exam:   GENERAL: NAD, Normal appearance.    NEUROLOGIC:  Alert/oriented x3.  HEENT:  NC/AT, no scleral icterus  CARDIAC:  RRR, +S1/S2  PULMONARY:  non-labored breathing      Current Medications[1]    Problem List[2]        Kurt Baptiste MD  [facility] [place]    * Please Note: This note was completed in part utilizing a dictation software.  Grammatical errors, random word insertions, spelling mistakes, and " incomplete sentences may be an occasional consequence of this system secondary to software limitations, ambient noise, and hardware issues.  If you have any questions or concerns about the content, text, or information contained within the body of this dictation, please contact the provider for clarification.**           [1]   Current Outpatient Medications:     Calcium Carbonate-Vitamin D (CALCIUM-VITAMIN D PO), Take by mouth, Disp: , Rfl:     fluticasone (FLONASE) 50 mcg/act nasal spray, 2 sprays into each nostril daily, Disp: 48 mL, Rfl: 1    hydrocortisone 2.5 % cream, Apply topically 2 (two) times a day, Disp: 30 g, Rfl: 1    ibuprofen (MOTRIN) 800 mg tablet, Take 1 tablet (800 mg total) by mouth every 6 (six) hours as needed for mild pain or moderate pain, Disp: 30 tablet, Rfl: 0    levothyroxine 75 mcg tablet, Take 1 tablet once a day 6 days a week -, Disp: 100 tablet, Rfl: 1    olmesartan (BENICAR) 5 mg tablet, Take 1 tablet (5 mg total) by mouth daily, Disp: 90 tablet, Rfl: 1    rosuvastatin (CRESTOR) 20 MG tablet, Take 1 tablet (20 mg total) by mouth daily, Disp: 90 tablet, Rfl: 1  [2]   Patient Active Problem List  Diagnosis    Depression with anxiety    Chronic bundle branch block    Hypothyroidism due to acquired atrophy of thyroid    Primary hypertension    Lipodystrophy of abdomen    Vitamin D deficiency    Allergic rhinitis    h/o Complete tear of left rotator cuff    Eczema    Dyslipidemia    Microscopic hematuria    Spondylosis    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

## 2025-05-27 NOTE — ASSESSMENT & PLAN NOTE
Long history of bilateral shoulder pain that respond well to ibuprofen 800 mg as needed-continue    Orders:    ibuprofen (MOTRIN) 800 mg tablet; Take 1 tablet (800 mg total) by mouth every 6 (six) hours as needed for mild pain or moderate pain

## 2025-05-27 NOTE — ASSESSMENT & PLAN NOTE
Blood pressure within acceptable parameters  Will continue her current regimen of olmesartan 5 mg daily

## 2025-05-27 NOTE — ASSESSMENT & PLAN NOTE
Reports she continues to experience severe itching sensation in her neck every time she wears clothing that covers her neck.  She denies any changes in her detergent in the last few months, new cosmetic products, personal hygiene products, foods  Currently she does not have any rashes or lesions in her neck  Responds well to hydrocortisone cream  She is requesting a referral to an allergist.  Ordered    Orders:    Ambulatory Referral to Allergy; Future

## 2025-07-21 ENCOUNTER — HOSPITAL ENCOUNTER (OUTPATIENT)
Dept: MAMMOGRAPHY | Facility: CLINIC | Age: 70
Discharge: HOME | End: 2025-07-21
Attending: STUDENT IN AN ORGANIZED HEALTH CARE EDUCATION/TRAINING PROGRAM
Payer: COMMERCIAL

## 2025-07-21 VITALS — HEIGHT: 59 IN | WEIGHT: 130 LBS | BODY MASS INDEX: 26.21 KG/M2

## 2025-07-21 DIAGNOSIS — Z12.31 ENCOUNTER FOR SCREENING MAMMOGRAM FOR BREAST CANCER: ICD-10-CM

## 2025-07-21 PROCEDURE — 77063 BREAST TOMOSYNTHESIS BI: CPT

## 2025-07-21 PROCEDURE — 77067 SCR MAMMO BI INCL CAD: CPT

## (undated) DEVICE — PENCIL SMOKE EVAC TELESCOPING W/TUBING

## (undated) DEVICE — INTENDED FOR TISSUE SEPARATION, AND OTHER PROCEDURES THAT REQUIRE A SHARP SURGICAL BLADE TO PUNCTURE OR CUT.: Brand: BARD-PARKER SAFETY BLADES SIZE 11, STERILE

## (undated) DEVICE — NEEDLE 25G X 1 1/2

## (undated) DEVICE — INTENDED FOR TISSUE SEPARATION, AND OTHER PROCEDURES THAT REQUIRE A SHARP SURGICAL BLADE TO PUNCTURE OR CUT.: Brand: BARD-PARKER ® SAFETYLOCK CARBON RIB-BACK BLADES

## (undated) DEVICE — SUT ETHILON 3-0 PS-1 18 IN 1663H

## (undated) DEVICE — DRAPE SHEET THREE QUARTER

## (undated) DEVICE — SYRINGE 10ML LL CONTROL TOP

## (undated) DEVICE — 4MM SINGLE USE CANNULA, 10MM PORT, 30CM LONG, TRI-PORT II: Brand: MICROAIRE®

## (undated) DEVICE — SPONGE LAP STERILE 18X18

## (undated) DEVICE — 3M™ STERI-STRIP™ REINFORCED ADHESIVE SKIN CLOSURES, R1547, 1/2 IN X 4 IN (12 MM X 100 MM), 6 STRIPS/ENVELOPE: Brand: 3M™ STERI-STRIP™

## (undated) DEVICE — PACK UNIVERSAL DRAPES SUB-Q ICD

## (undated) DEVICE — STERILE POLYISOPRENE POWDER-FREE SURGICAL GLOVES: Brand: PROTEXIS

## (undated) DEVICE — SUT VICRYL 2-0 J459H

## (undated) DEVICE — DRESSING XEROFORM 5 X 9

## (undated) DEVICE — STANDARD SURGICAL GOWN, L: Brand: CONVERTORS

## (undated) DEVICE — SYRINGE 3ML LL

## (undated) DEVICE — SYRINGE 30ML LL

## (undated) DEVICE — SUT ETHILON 5-0 PS-2 18 IN 1666H

## (undated) DEVICE — NDL CNTR 20CT FM MAG: Brand: MEDLINE INDUSTRIES, INC.

## (undated) DEVICE — NEEDLE FILTER 5 MICR 19G X 1.5IN

## (undated) DEVICE — CANNISTER WASTE IMPLOSION PROOF

## (undated) DEVICE — TUBING ASPIRATION LIPOSUCTION SET 12FT

## (undated) DEVICE — KERLIX BANDAGE ROLL: Brand: KERLIX

## (undated) DEVICE — COTTON TIP APPLICTOR 2 PK

## (undated) DEVICE — 40529 DERMAPROX PAD 11'' X 15'' X 1'': Brand: 40529 DERMAPROX PAD 11'' X 15'' X 1''

## (undated) DEVICE — SUT VICRYL 4-0 PS-2 18 IN J496G

## (undated) DEVICE — LIGHT GLOVE GREEN

## (undated) DEVICE — WET SKIN PREP TRAY: Brand: MEDLINE INDUSTRIES, INC.

## (undated) DEVICE — SUT PROLENE 3-0 PC-5 18 IN 8632G

## (undated) DEVICE — SCD SEQUENTIAL COMPRESSION COMFORT SLEEVE MEDIUM KNEE LENGTH: Brand: KENDALL SCD

## (undated) DEVICE — SKIN MARKER DUAL TIP WITH RULER CAP, FLEXIBLE RULER AND LABELS: Brand: DEVON

## (undated) DEVICE — JP CHAN DRN SIL HUBLESS 19FR W/TRO: Brand: CARDINAL HEALTH

## (undated) DEVICE — TUBING SUCTION 5MM X 12 FT

## (undated) DEVICE — NEEDLE BLUNT 18 G X 1 1/2IN

## (undated) DEVICE — JACKSON-PRATT 100CC BULB RESERVOIR: Brand: CARDINAL HEALTH

## (undated) DEVICE — SUT ETHIBOND 0 CT-2 30 IN X412H

## (undated) DEVICE — TRAY FOLEY 16FR URIMETER SURESTEP

## (undated) DEVICE — SMOKE EVACUATION TUBING WITH 7/8" HOSE TO HOSE CONNECTOR: Brand: BUFFALO FILTER

## (undated) DEVICE — DRAPE TOWEL: Brand: CONVERTORS

## (undated) DEVICE — FILTER ABSOLUTE BIOFILTER 0.2MICRON F/LIPOSUCTION

## (undated) DEVICE — TUBING INFILTRATION 9FT

## (undated) DEVICE — SPONGE STICK WITH PVP-I: Brand: KENDALL

## (undated) DEVICE — 4-PORT MANIFOLD: Brand: NEPTUNE 2

## (undated) DEVICE — CHEST/BREAST DRAPE: Brand: CONVERTORS

## (undated) DEVICE — LAMINECTOMY ARM CRADLE FOAM POSITIONER: Brand: CARDINAL HEALTH